# Patient Record
Sex: FEMALE | Race: WHITE | NOT HISPANIC OR LATINO | Employment: OTHER | URBAN - NONMETROPOLITAN AREA
[De-identification: names, ages, dates, MRNs, and addresses within clinical notes are randomized per-mention and may not be internally consistent; named-entity substitution may affect disease eponyms.]

---

## 2017-02-28 ENCOUNTER — OFFICE VISIT (OUTPATIENT)
Dept: FAMILY MEDICINE CLINIC | Facility: CLINIC | Age: 67
End: 2017-02-28

## 2017-02-28 VITALS
DIASTOLIC BLOOD PRESSURE: 82 MMHG | HEIGHT: 67 IN | WEIGHT: 205.8 LBS | BODY MASS INDEX: 32.3 KG/M2 | SYSTOLIC BLOOD PRESSURE: 134 MMHG | HEART RATE: 70 BPM | OXYGEN SATURATION: 98 %

## 2017-02-28 DIAGNOSIS — Z00.00 ANNUAL PHYSICAL EXAM: ICD-10-CM

## 2017-02-28 DIAGNOSIS — Z11.59 NEED FOR HEPATITIS C SCREENING TEST: ICD-10-CM

## 2017-02-28 DIAGNOSIS — E55.9 VITAMIN D DEFICIENCY: ICD-10-CM

## 2017-02-28 DIAGNOSIS — I10 ESSENTIAL HYPERTENSION: Primary | Chronic | ICD-10-CM

## 2017-02-28 PROCEDURE — 99213 OFFICE O/P EST LOW 20 MIN: CPT | Performed by: GENERAL PRACTICE

## 2017-02-28 RX ORDER — LOSARTAN POTASSIUM AND HYDROCHLOROTHIAZIDE 25; 100 MG/1; MG/1
1 TABLET ORAL DAILY
Qty: 30 TABLET | Refills: 4 | Status: SHIPPED | OUTPATIENT
Start: 2017-02-28 | End: 2017-06-13 | Stop reason: SDUPTHER

## 2017-02-28 RX ORDER — AMLODIPINE BESYLATE 10 MG/1
10 TABLET ORAL DAILY
Qty: 30 TABLET | Refills: 3 | Status: SHIPPED | OUTPATIENT
Start: 2017-02-28 | End: 2017-06-13 | Stop reason: SDUPTHER

## 2017-02-28 NOTE — PROGRESS NOTES
Subjective   Cari Brooks is a 66 y.o. female.     Chief Complaint   Patient presents with   • Follow-up   • Hypertension     History of Present Illness     For review and evaluation of management of chronic medical problems. Blood pressure is not well controlled.   Hypertension   This is a chronic problem. The current episode started more than 1 year ago. The problem is unchanged. The problem is uncontrolled. Associated symptoms include anxiety. Pertinent negatives include no chest pain, headaches, neck pain, palpitations or shortness of breath. There are no associated agents to hypertension. The current treatment provides moderate improvement. There are no compliance problems.       The following portions of the patient's history were reviewed and updated as appropriate: allergies, current medications, past social history and problem list.    Current Outpatient Prescriptions:   •  amLODIPine (NORVASC) 10 MG tablet, Take 1 tablet by mouth Daily., Disp: 30 tablet, Rfl: 3  •  Cholecalciferol (D3-1000) 1000 UNITS capsule, Take 1,000 Units by mouth Daily. cholecalciferol (vitamin D3) 1,000 unit capsule, Disp: , Rfl:   •  hydrOXYzine (ATARAX) 25 MG tablet, Take 1 tablet by mouth 2 (Two) Times a Day As Needed for anxiety. 0.5-1 tablet 2 times per day Oral PRN, Disp: 30 tablet, Rfl: 1  •  losartan-hydrochlorothiazide (HYZAAR) 100-25 MG per tablet, Take 1 tablet by mouth Daily., Disp: 30 tablet, Rfl: 4  •  potassium chloride (MICRO-K) 8 MEQ CR capsule, Take 8 mEq by mouth Daily., Disp: , Rfl:     Review of Systems   Constitutional: Negative.  Negative for activity change, appetite change, chills, fatigue, fever and unexpected weight change.   HENT: Negative.  Negative for congestion, ear pain, hearing loss, nosebleeds, rhinorrhea, sinus pressure, sneezing, sore throat, tinnitus and trouble swallowing.    Eyes: Negative.  Negative for pain, discharge, redness, itching and visual disturbance.   Respiratory: Negative.   "Negative for apnea, cough, chest tightness, shortness of breath and wheezing.    Cardiovascular: Negative.  Negative for chest pain, palpitations and leg swelling.   Gastrointestinal: Negative.  Negative for abdominal distention, abdominal pain, constipation, diarrhea, nausea and vomiting.   Endocrine: Negative.    Genitourinary: Negative.  Negative for dysuria, frequency and urgency.   Musculoskeletal: Negative.  Negative for arthralgias, back pain, gait problem, joint swelling, myalgias, neck pain and neck stiffness.   Skin: Negative.  Negative for color change and rash.   Allergic/Immunologic: Negative.    Neurological: Negative.  Negative for dizziness, weakness, light-headedness, numbness and headaches.   Hematological: Negative.  Negative for adenopathy.   Psychiatric/Behavioral: Negative.  Negative for dysphoric mood and sleep disturbance. The patient is not nervous/anxious.      Objective     Visit Vitals   • /82   • Pulse 70   • Ht 66.5\" (168.9 cm)   • Wt 205 lb 12.8 oz (93.4 kg)   • SpO2 98%   • BMI 32.72 kg/m2     Physical Exam   Constitutional: She is oriented to person, place, and time. She appears well-developed and well-nourished. No distress.   HENT:   Head: Normocephalic and atraumatic.   Nose: Nose normal.   Mouth/Throat: Oropharynx is clear and moist.   Eyes: Conjunctivae and EOM are normal. Pupils are equal, round, and reactive to light. Right eye exhibits no discharge. Left eye exhibits no discharge.   Neck: No thyromegaly present.   Cardiovascular: Normal rate, regular rhythm, normal heart sounds and intact distal pulses.    Pulmonary/Chest: Effort normal and breath sounds normal.   Lymphadenopathy:     She has no cervical adenopathy.   Neurological: She is alert and oriented to person, place, and time.   Skin: Skin is warm and dry.   Psychiatric: She has a normal mood and affect.   Nursing note and vitals reviewed.    Assessment/Plan     Problem List Items Addressed This Visit        " Cardiovascular and Mediastinum    Essential hypertension - Primary    Relevant Medications    amLODIPine (NORVASC) 10 MG tablet    losartan-hydrochlorothiazide (HYZAAR) 100-25 MG per tablet       Digestive    Vitamin D deficiency    Relevant Orders    Vitamin D 25 Hydroxy      Other Visit Diagnoses     Annual physical exam        Relevant Orders    Comprehensive Metabolic Panel    Lipid Panel    Urinalysis With / Culture If Indicated    Hepatitis C antibody    Need for hepatitis C screening test        Relevant Orders    Hepatitis C antibody        Recommended weight loss and exercise. If blood pressure not better next time will need to adjust medications.     New Medications Ordered This Visit   Medications   • amLODIPine (NORVASC) 10 MG tablet     Sig: Take 1 tablet by mouth Daily.     Dispense:  30 tablet     Refill:  3   • losartan-hydrochlorothiazide (HYZAAR) 100-25 MG per tablet     Sig: Take 1 tablet by mouth Daily.     Dispense:  30 tablet     Refill:  4

## 2017-06-06 ENCOUNTER — LAB (OUTPATIENT)
Dept: LAB | Facility: HOSPITAL | Age: 67
End: 2017-06-06

## 2017-06-06 DIAGNOSIS — Z11.59 NEED FOR HEPATITIS C SCREENING TEST: ICD-10-CM

## 2017-06-06 DIAGNOSIS — Z00.00 ANNUAL PHYSICAL EXAM: ICD-10-CM

## 2017-06-06 DIAGNOSIS — E55.9 VITAMIN D DEFICIENCY: ICD-10-CM

## 2017-06-06 LAB
25(OH)D3 SERPL-MCNC: 30.7 NG/ML (ref 30–100)
ALBUMIN SERPL-MCNC: 4.5 G/DL (ref 3.4–4.8)
ALBUMIN/GLOB SERPL: 1.3 G/DL (ref 1.1–1.8)
ALP SERPL-CCNC: 100 U/L (ref 38–126)
ALT SERPL W P-5'-P-CCNC: 30 U/L (ref 9–52)
ANION GAP SERPL CALCULATED.3IONS-SCNC: 9 MMOL/L (ref 5–15)
ARTICHOKE IGE QN: 107 MG/DL (ref 1–129)
AST SERPL-CCNC: 32 U/L (ref 14–36)
BACTERIA UR QL AUTO: ABNORMAL /HPF
BILIRUB SERPL-MCNC: 0.6 MG/DL (ref 0.2–1.3)
BILIRUB UR QL STRIP: NEGATIVE
BUN BLD-MCNC: 17 MG/DL (ref 7–21)
BUN/CREAT SERPL: 20.5 (ref 7–25)
CALCIUM SPEC-SCNC: 9.9 MG/DL (ref 8.4–10.2)
CHLORIDE SERPL-SCNC: 100 MMOL/L (ref 95–110)
CHOLEST SERPL-MCNC: 198 MG/DL (ref 0–199)
CLARITY UR: CLEAR
CO2 SERPL-SCNC: 30 MMOL/L (ref 22–31)
COLOR UR: YELLOW
CREAT BLD-MCNC: 0.83 MG/DL (ref 0.5–1)
GFR SERPL CREATININE-BSD FRML MDRD: 69 ML/MIN/1.73 (ref 60–104)
GLOBULIN UR ELPH-MCNC: 3.6 GM/DL (ref 2.3–3.5)
GLUCOSE BLD-MCNC: 96 MG/DL (ref 60–100)
GLUCOSE UR STRIP-MCNC: NEGATIVE MG/DL
HDLC SERPL-MCNC: 57 MG/DL (ref 60–200)
HGB UR QL STRIP.AUTO: ABNORMAL
HYALINE CASTS UR QL AUTO: ABNORMAL /LPF
KETONES UR QL STRIP: NEGATIVE
LDLC/HDLC SERPL: 1.96 {RATIO} (ref 0–3.22)
LEUKOCYTE ESTERASE UR QL STRIP.AUTO: NEGATIVE
NITRITE UR QL STRIP: NEGATIVE
PH UR STRIP.AUTO: 6.5 [PH] (ref 5–9)
POTASSIUM BLD-SCNC: 3.7 MMOL/L (ref 3.5–5.1)
PROT SERPL-MCNC: 8.1 G/DL (ref 6.3–8.6)
PROT UR QL STRIP: NEGATIVE
RBC # UR: ABNORMAL /HPF
REF LAB TEST METHOD: ABNORMAL
SODIUM BLD-SCNC: 139 MMOL/L (ref 137–145)
SP GR UR STRIP: 1.01 (ref 1–1.03)
SQUAMOUS #/AREA URNS HPF: ABNORMAL /HPF
TRIGL SERPL-MCNC: 145 MG/DL (ref 20–199)
UROBILINOGEN UR QL STRIP: ABNORMAL
WBC UR QL AUTO: ABNORMAL /HPF

## 2017-06-06 PROCEDURE — 36415 COLL VENOUS BLD VENIPUNCTURE: CPT

## 2017-06-06 PROCEDURE — 80053 COMPREHEN METABOLIC PANEL: CPT | Performed by: GENERAL PRACTICE

## 2017-06-06 PROCEDURE — 81001 URINALYSIS AUTO W/SCOPE: CPT | Performed by: GENERAL PRACTICE

## 2017-06-06 PROCEDURE — 86803 HEPATITIS C AB TEST: CPT | Performed by: GENERAL PRACTICE

## 2017-06-06 PROCEDURE — 82306 VITAMIN D 25 HYDROXY: CPT | Performed by: GENERAL PRACTICE

## 2017-06-06 PROCEDURE — 80061 LIPID PANEL: CPT | Performed by: GENERAL PRACTICE

## 2017-06-07 LAB — HCV AB SER DONR QL: NEGATIVE

## 2017-06-12 DIAGNOSIS — Z12.31 ENCOUNTER FOR SCREENING MAMMOGRAM FOR MALIGNANT NEOPLASM OF BREAST: Primary | ICD-10-CM

## 2017-06-13 ENCOUNTER — OFFICE VISIT (OUTPATIENT)
Dept: FAMILY MEDICINE CLINIC | Facility: CLINIC | Age: 67
End: 2017-06-13

## 2017-06-13 VITALS
HEIGHT: 67 IN | DIASTOLIC BLOOD PRESSURE: 70 MMHG | WEIGHT: 203 LBS | SYSTOLIC BLOOD PRESSURE: 134 MMHG | HEART RATE: 68 BPM | BODY MASS INDEX: 31.86 KG/M2 | OXYGEN SATURATION: 98 %

## 2017-06-13 DIAGNOSIS — Z12.31 ENCOUNTER FOR SCREENING MAMMOGRAM FOR BREAST CANCER: ICD-10-CM

## 2017-06-13 DIAGNOSIS — E55.9 VITAMIN D DEFICIENCY: Chronic | ICD-10-CM

## 2017-06-13 DIAGNOSIS — I10 ESSENTIAL HYPERTENSION: Primary | Chronic | ICD-10-CM

## 2017-06-13 PROCEDURE — 99213 OFFICE O/P EST LOW 20 MIN: CPT | Performed by: GENERAL PRACTICE

## 2017-06-13 RX ORDER — LOSARTAN POTASSIUM AND HYDROCHLOROTHIAZIDE 25; 100 MG/1; MG/1
1 TABLET ORAL DAILY
Qty: 90 TABLET | Refills: 3 | Status: SHIPPED | OUTPATIENT
Start: 2017-06-13 | End: 2017-12-13 | Stop reason: SDUPTHER

## 2017-06-13 RX ORDER — POTASSIUM CHLORIDE 600 MG/1
8 CAPSULE, EXTENDED RELEASE ORAL DAILY
Qty: 90 CAPSULE | Refills: 3 | Status: SHIPPED | OUTPATIENT
Start: 2017-06-13 | End: 2017-12-13 | Stop reason: SDUPTHER

## 2017-06-13 RX ORDER — AMLODIPINE BESYLATE 10 MG/1
10 TABLET ORAL DAILY
Qty: 90 TABLET | Refills: 3 | Status: SHIPPED | OUTPATIENT
Start: 2017-06-13 | End: 2017-12-13 | Stop reason: SDUPTHER

## 2017-06-13 NOTE — PROGRESS NOTES
Subjective   Cari Brooks is a 66 y.o. female.     Chief Complaint   Patient presents with   • Annual Exam   • Hypertension   • Anxiety   • Vitamin D Deficiency   • Osteoarthritis     For review and evaluation of management of chronic medical problems.  Labs reviewed. Due for mammogram.    Hypertension   This is a chronic problem. The current episode started more than 1 year ago. The problem is unchanged. The problem is controlled. There are no associated agents to hypertension. Past treatments include angiotensin blockers, diuretics, beta blockers and calcium channel blockers. The current treatment provides significant improvement. There are no compliance problems.       The following portions of the patient's history were reviewed and updated as appropriate: allergies, current medications, past family and social history and problem list.      Current Outpatient Prescriptions:   •  amLODIPine (NORVASC) 10 MG tablet, Take 1 tablet by mouth Daily., Disp: 90 tablet, Rfl: 3  •  Cholecalciferol (D3-1000) 1000 UNITS capsule, Take 1,000 Units by mouth Daily. cholecalciferol (vitamin D3) 1,000 unit capsule, Disp: , Rfl:   •  hydrOXYzine (ATARAX) 25 MG tablet, Take 1 tablet by mouth 2 (Two) Times a Day As Needed for anxiety. 0.5-1 tablet 2 times per day Oral PRN, Disp: 30 tablet, Rfl: 1  •  losartan-hydrochlorothiazide (HYZAAR) 100-25 MG per tablet, Take 1 tablet by mouth Daily., Disp: 90 tablet, Rfl: 3  •  potassium chloride (MICRO-K) 8 MEQ CR capsule, Take 1 capsule by mouth Daily., Disp: 90 capsule, Rfl: 3    Review of Systems   Constitutional: Negative.  Negative for activity change, appetite change and unexpected weight change.   HENT: Negative.  Negative for ear pain, hearing loss, nosebleeds, rhinorrhea, sinus pressure, sneezing, tinnitus and trouble swallowing.    Eyes: Negative.  Negative for pain, discharge, redness, itching and visual disturbance.   Respiratory: Negative.  Negative for apnea, chest  "tightness and wheezing.    Cardiovascular: Negative.  Negative for leg swelling.   Gastrointestinal: Negative.  Negative for abdominal distention, constipation and diarrhea.   Endocrine: Negative.    Genitourinary: Negative.  Negative for dysuria, frequency and urgency.   Musculoskeletal: Negative.  Negative for back pain, gait problem and neck stiffness.   Skin: Negative.  Negative for color change.   Allergic/Immunologic: Negative.    Neurological: Negative.  Negative for light-headedness.   Hematological: Negative.  Negative for adenopathy.   Psychiatric/Behavioral: Negative.  Negative for dysphoric mood and sleep disturbance.     Objective     Visit Vitals   • /70   • Pulse 68   • Ht 66.5\" (168.9 cm)   • Wt 203 lb (92.1 kg)   • LMP  (LMP Unknown)   • SpO2 98%   • BMI 32.27 kg/m2     Physical Exam   Constitutional: She is oriented to person, place, and time. She appears well-developed and well-nourished. No distress.   HENT:   Head: Normocephalic and atraumatic.   Nose: Nose normal.   Mouth/Throat: Oropharynx is clear and moist.   Eyes: Conjunctivae and EOM are normal. Pupils are equal, round, and reactive to light. Right eye exhibits no discharge. Left eye exhibits no discharge.   Neck: No tracheal deviation present. No thyromegaly present.   Cardiovascular: Normal rate, regular rhythm, normal heart sounds and intact distal pulses.    No murmur heard.  Pulmonary/Chest: Effort normal and breath sounds normal. No respiratory distress. She has no wheezes. She has no rales. She exhibits no tenderness. Right breast exhibits no inverted nipple, no mass, no nipple discharge, no skin change and no tenderness. Left breast exhibits no inverted nipple, no mass, no nipple discharge, no skin change and no tenderness.   Abdominal: Soft. Bowel sounds are normal. She exhibits no distension and no mass. There is no tenderness. No hernia.   Musculoskeletal: Normal range of motion. She exhibits no deformity. "   Lymphadenopathy:     She has no cervical adenopathy.   Neurological: She is alert and oriented to person, place, and time. She has normal reflexes.   Skin: Skin is warm and dry.   Psychiatric: She has a normal mood and affect. Her behavior is normal. Judgment and thought content normal.   Nursing note and vitals reviewed.    Results for orders placed or performed in visit on 06/06/17   Comprehensive Metabolic Panel   Result Value Ref Range    Glucose 96 60 - 100 mg/dL    BUN 17 7 - 21 mg/dL    Creatinine 0.83 0.50 - 1.00 mg/dL    Sodium 139 137 - 145 mmol/L    Potassium 3.7 3.5 - 5.1 mmol/L    Chloride 100 95 - 110 mmol/L    CO2 30.0 22.0 - 31.0 mmol/L    Calcium 9.9 8.4 - 10.2 mg/dL    Total Protein 8.1 6.3 - 8.6 g/dL    Albumin 4.50 3.40 - 4.80 g/dL    ALT (SGPT) 30 9 - 52 U/L    AST (SGOT) 32 14 - 36 U/L    Alkaline Phosphatase 100 38 - 126 U/L    Total Bilirubin 0.6 0.2 - 1.3 mg/dL    eGFR Non African Amer 69 >60 mL/min/1.73    Globulin 3.6 (H) 2.3 - 3.5 gm/dL    A/G Ratio 1.3 1.1 - 1.8 g/dL    BUN/Creatinine Ratio 20.5 7.0 - 25.0    Anion Gap 9.0 5.0 - 15.0 mmol/L   Lipid Panel   Result Value Ref Range    Total Cholesterol 198 0 - 199 mg/dL    Triglycerides 145 20 - 199 mg/dL    HDL Cholesterol 57 (L) 60 - 200 mg/dL    LDL Cholesterol  107 1 - 129 mg/dL    LDL/HDL Ratio 1.96 0.00 - 3.22   Vitamin D 25 Hydroxy   Result Value Ref Range    25 Hydroxy, Vitamin D 30.7 30.0 - 100.0 ng/ml   Urinalysis With / Culture If Indicated   Result Value Ref Range    Color, UA Yellow Yellow, Straw, Dark Yellow, [place]    Appearance, UA Clear Clear    pH, UA 6.5 5.0 - 9.0    Specific Gravity, UA 1.012 1.003 - 1.030    Glucose, UA Negative Negative    Ketones, UA Negative Negative    Bilirubin, UA Negative Negative    Blood, UA Trace (A) Negative    Protein, UA Negative Negative    Leuk Esterase, UA Negative Negative    Nitrite, UA Negative Negative    Urobilinogen, UA 0.2 E.U./dL 0.2 - 1.0 E.U./dL   Hepatitis C antibody   Result  Value Ref Range    Hepatitis C Ab Negative Negative   Urinalysis, Microscopic Only   Result Value Ref Range    RBC, UA 0-2 (A) None Seen /HPF    WBC, UA 0-2 None Seen, 0-2, 3-5 /HPF    Bacteria, UA None Seen None Seen /HPF    Squamous Epithelial Cells, UA None Seen None Seen, 0-2 /HPF    Hyaline Casts, UA None Seen None Seen /LPF    Methodology Automated Microscopy       Assessment/Plan   Problem List Items Addressed This Visit        Cardiovascular and Mediastinum    Essential hypertension - Primary    Relevant Medications    losartan-hydrochlorothiazide (HYZAAR) 100-25 MG per tablet    amLODIPine (NORVASC) 10 MG tablet       Digestive    Vitamin D deficiency      Other Visit Diagnoses     Encounter for screening mammogram for breast cancer            Will notify regarding results.     New Medications Ordered This Visit   Medications   • potassium chloride (MICRO-K) 8 MEQ CR capsule     Sig: Take 1 capsule by mouth Daily.     Dispense:  90 capsule     Refill:  3   • losartan-hydrochlorothiazide (HYZAAR) 100-25 MG per tablet     Sig: Take 1 tablet by mouth Daily.     Dispense:  90 tablet     Refill:  3   • amLODIPine (NORVASC) 10 MG tablet     Sig: Take 1 tablet by mouth Daily.     Dispense:  90 tablet     Refill:  3     Return in about 6 months (around 12/13/2017) for medicare wellness visit.

## 2017-12-06 ENCOUNTER — TELEPHONE (OUTPATIENT)
Dept: FAMILY MEDICINE CLINIC | Facility: CLINIC | Age: 67
End: 2017-12-06

## 2017-12-13 ENCOUNTER — LAB (OUTPATIENT)
Dept: LAB | Facility: HOSPITAL | Age: 67
End: 2017-12-13

## 2017-12-13 ENCOUNTER — OFFICE VISIT (OUTPATIENT)
Dept: FAMILY MEDICINE CLINIC | Facility: CLINIC | Age: 67
End: 2017-12-13

## 2017-12-13 VITALS
SYSTOLIC BLOOD PRESSURE: 132 MMHG | HEIGHT: 67 IN | DIASTOLIC BLOOD PRESSURE: 75 MMHG | WEIGHT: 205.2 LBS | OXYGEN SATURATION: 98 % | BODY MASS INDEX: 32.21 KG/M2 | HEART RATE: 64 BPM

## 2017-12-13 DIAGNOSIS — T50.905A ADVERSE EFFECT OF UNSPECIFIED DRUGS, MEDICAMENTS AND BIOLOGICAL SUBSTANCES, INITIAL ENCOUNTER: ICD-10-CM

## 2017-12-13 DIAGNOSIS — R19.7 DIARRHEA, UNSPECIFIED TYPE: Primary | ICD-10-CM

## 2017-12-13 DIAGNOSIS — R19.7 DIARRHEA, UNSPECIFIED TYPE: ICD-10-CM

## 2017-12-13 DIAGNOSIS — I10 ESSENTIAL HYPERTENSION: Chronic | ICD-10-CM

## 2017-12-13 DIAGNOSIS — R11.2 NON-INTRACTABLE VOMITING WITH NAUSEA, UNSPECIFIED VOMITING TYPE: ICD-10-CM

## 2017-12-13 LAB
ALBUMIN SERPL-MCNC: 4.5 G/DL (ref 3.4–4.8)
ALBUMIN/GLOB SERPL: 1.3 G/DL (ref 1.1–1.8)
ALP SERPL-CCNC: 79 U/L (ref 38–126)
ALT SERPL W P-5'-P-CCNC: 35 U/L (ref 9–52)
ANION GAP SERPL CALCULATED.3IONS-SCNC: 14 MMOL/L (ref 5–15)
AST SERPL-CCNC: 48 U/L (ref 14–36)
BASOPHILS # BLD AUTO: 0.04 10*3/MM3 (ref 0–0.2)
BASOPHILS NFR BLD AUTO: 0.4 % (ref 0–2)
BILIRUB SERPL-MCNC: 0.5 MG/DL (ref 0.2–1.3)
BUN BLD-MCNC: 16 MG/DL (ref 7–21)
BUN/CREAT SERPL: 16.7 (ref 7–25)
CALCIUM SPEC-SCNC: 10.3 MG/DL (ref 8.4–10.2)
CHLORIDE SERPL-SCNC: 97 MMOL/L (ref 95–110)
CO2 SERPL-SCNC: 30 MMOL/L (ref 22–31)
CREAT BLD-MCNC: 0.96 MG/DL (ref 0.5–1)
DEPRECATED RDW RBC AUTO: 39.3 FL (ref 36.4–46.3)
EOSINOPHIL # BLD AUTO: 0.16 10*3/MM3 (ref 0–0.7)
EOSINOPHIL NFR BLD AUTO: 1.7 % (ref 0–7)
ERYTHROCYTE [DISTWIDTH] IN BLOOD BY AUTOMATED COUNT: 13.1 % (ref 11.5–14.5)
GFR SERPL CREATININE-BSD FRML MDRD: 58 ML/MIN/1.73 (ref 45–104)
GLOBULIN UR ELPH-MCNC: 3.6 GM/DL (ref 2.3–3.5)
GLUCOSE BLD-MCNC: 78 MG/DL (ref 60–100)
HCT VFR BLD AUTO: 42.6 % (ref 35–45)
HGB BLD-MCNC: 14.6 G/DL (ref 12–15.5)
IMM GRANULOCYTES # BLD: 0.02 10*3/MM3 (ref 0–0.02)
IMM GRANULOCYTES NFR BLD: 0.2 % (ref 0–0.5)
LYMPHOCYTES # BLD AUTO: 2.93 10*3/MM3 (ref 0.6–4.2)
LYMPHOCYTES NFR BLD AUTO: 31.2 % (ref 10–50)
MCH RBC QN AUTO: 29 PG (ref 26.5–34)
MCHC RBC AUTO-ENTMCNC: 34.3 G/DL (ref 31.4–36)
MCV RBC AUTO: 84.5 FL (ref 80–98)
MONOCYTES # BLD AUTO: 0.78 10*3/MM3 (ref 0–0.9)
MONOCYTES NFR BLD AUTO: 8.3 % (ref 0–12)
NEUTROPHILS # BLD AUTO: 5.46 10*3/MM3 (ref 2–8.6)
NEUTROPHILS NFR BLD AUTO: 58.2 % (ref 37–80)
PLATELET # BLD AUTO: 342 10*3/MM3 (ref 150–450)
PMV BLD AUTO: 9.5 FL (ref 8–12)
POTASSIUM BLD-SCNC: 3 MMOL/L (ref 3.5–5.1)
PROT SERPL-MCNC: 8.1 G/DL (ref 6.3–8.6)
RBC # BLD AUTO: 5.04 10*6/MM3 (ref 3.77–5.16)
SODIUM BLD-SCNC: 141 MMOL/L (ref 137–145)
T4 FREE SERPL-MCNC: 1.27 NG/DL (ref 0.78–2.19)
TSH SERPL DL<=0.05 MIU/L-ACNC: 1.88 MIU/ML (ref 0.46–4.68)
WBC NRBC COR # BLD: 9.39 10*3/MM3 (ref 3.2–9.8)

## 2017-12-13 PROCEDURE — 85025 COMPLETE CBC W/AUTO DIFF WBC: CPT

## 2017-12-13 PROCEDURE — 99213 OFFICE O/P EST LOW 20 MIN: CPT | Performed by: GENERAL PRACTICE

## 2017-12-13 PROCEDURE — 86003 ALLG SPEC IGE CRUDE XTRC EA: CPT

## 2017-12-13 PROCEDURE — 80053 COMPREHEN METABOLIC PANEL: CPT

## 2017-12-13 PROCEDURE — 84443 ASSAY THYROID STIM HORMONE: CPT

## 2017-12-13 PROCEDURE — G0438 PPPS, INITIAL VISIT: HCPCS | Performed by: GENERAL PRACTICE

## 2017-12-13 PROCEDURE — 36415 COLL VENOUS BLD VENIPUNCTURE: CPT

## 2017-12-13 PROCEDURE — 84439 ASSAY OF FREE THYROXINE: CPT

## 2017-12-13 RX ORDER — LOSARTAN POTASSIUM AND HYDROCHLOROTHIAZIDE 25; 100 MG/1; MG/1
1 TABLET ORAL DAILY
Qty: 90 TABLET | Refills: 3 | Status: SHIPPED | OUTPATIENT
Start: 2017-12-13 | End: 2018-06-22 | Stop reason: SDUPTHER

## 2017-12-13 RX ORDER — HYDROXYZINE HYDROCHLORIDE 25 MG/1
25 TABLET, FILM COATED ORAL 2 TIMES DAILY PRN
Qty: 30 TABLET | Refills: 3 | Status: SHIPPED | OUTPATIENT
Start: 2017-12-13 | End: 2018-10-05

## 2017-12-13 RX ORDER — OMEPRAZOLE 20 MG/1
20 CAPSULE, DELAYED RELEASE ORAL DAILY
Qty: 30 CAPSULE | Refills: 5 | Status: SHIPPED | OUTPATIENT
Start: 2017-12-13 | End: 2018-10-05

## 2017-12-13 RX ORDER — AMLODIPINE BESYLATE 10 MG/1
10 TABLET ORAL DAILY
Qty: 90 TABLET | Refills: 3 | Status: SHIPPED | OUTPATIENT
Start: 2017-12-13 | End: 2018-06-22 | Stop reason: SDUPTHER

## 2017-12-13 RX ORDER — POTASSIUM CHLORIDE 600 MG/1
8 CAPSULE, EXTENDED RELEASE ORAL DAILY
Qty: 90 CAPSULE | Refills: 3 | Status: SHIPPED | OUTPATIENT
Start: 2017-12-13 | End: 2017-12-14 | Stop reason: SDUPTHER

## 2017-12-13 NOTE — PROGRESS NOTES
QUICK REFERENCE INFORMATION:  The ABCs of the Annual Wellness Visit    Initial Medicare Wellness Visit    HEALTH RISK ASSESSMENT    1950    Recent Hospitalizations:  No hospitalization(s) within the last year..        Current Medical Providers:  Patient Care Team:  Peyton Stockton MD as PCP - General  Peyton Stockton MD as PCP - Claims Attributed  Jorje Delgado, OD as Consulting Physician (Optometry)  Nathen Szymanski DMD as Consulting Physician (Dental General Practice)        Smoking Status:  History   Smoking Status   • Never Smoker   Smokeless Tobacco   • Never Used       Alcohol Consumption:  History   Alcohol Use No       Depression Screen:   PHQ-2/PHQ-9 Depression Screening 12/13/2017   Little interest or pleasure in doing things 0   Feeling down, depressed, or hopeless 0   Trouble falling or staying asleep, or sleeping too much 1   Feeling tired or having little energy 1   Poor appetite or overeating 0   Feeling bad about yourself - or that you are a failure or have let yourself or your family down 0   Trouble concentrating on things, such as reading the newspaper or watching television 0   Moving or speaking so slowly that other people could have noticed. Or the opposite - being so fidgety or restless that you have been moving around a lot more than usual 0   Thoughts that you would be better off dead, or of hurting yourself in some way 0   Total Score 2   If you checked off any problems, how difficult have these problems made it for you to do your work, take care of things at home, or get along with other people? Not difficult at all       Health Habits and Functional and Cognitive Screening:  Functional & Cognitive Status 12/13/2017   Do you have difficulty preparing food and eating? No   Do you have difficulty bathing yourself, getting dressed or grooming yourself? No   Do you have difficulty using the toilet? No   Do you have difficulty moving around from place to place? No   Do you have  trouble with steps or getting out of a bed or a chair? No   In the past year have you fallen or experienced a near fall? No   Current Diet Well Balanced Diet   Dental Exam Up to date   Eye Exam Up to date   Exercise (times per week) 2 times per week   Do you need help using the phone?  No   Are you deaf or do you have serious difficulty hearing?  No   Do you need help with transportation? No   Do you need help shopping? No   Do you need help preparing meals?  No   Do you need help with housework?  No   Do you need help with laundry? No   Do you need help taking your medications? No   Do you need help managing money? No   Have you felt unusual stress, anger or loneliness in the last month? No   Who do you live with? Spouse   If you need help, do you have trouble finding someone available to you? No   Do you have difficulty concentrating, remembering or making decisions? No           Does the patient have evidence of cognitive impairment? No    Asiprin use counseling: Does not need ASA (and currently is not on it)      Recent Lab Results:    Visual Acuity:  No exam data present    Age-appropriate Screening Schedule:  Refer to the list below for future screening recommendations based on patient's age, sex and/or medical conditions. Orders for these recommended tests are listed in the plan section. The patient has been provided with a written plan.    Health Maintenance   Topic Date Due   • PNEUMOCOCCAL VACCINES (65+ LOW/MEDIUM RISK) (2 of 2 - PPSV23) 01/09/2019   • MAMMOGRAM  06/13/2019   • COLONOSCOPY  11/13/2022   • TDAP/TD VACCINES (2 - Td) 06/13/2027   • INFLUENZA VACCINE  Addressed   • ZOSTER VACCINE  Addressed   • PAP SMEAR  Excluded        Subjective   History of Present Illness    Cari Brooks is a 66 y.o. female who presents for an Annual Wellness Visit.    The following portions of the patient's history were reviewed and updated as appropriate: allergies, current medications, past family history, past  medical history, past social history, past surgical history and problem list.    Outpatient Medications Prior to Visit   Medication Sig Dispense Refill   • Cholecalciferol (D3-1000) 1000 UNITS capsule Take 1,000 Units by mouth Daily. cholecalciferol (vitamin D3) 1,000 unit capsule     • amLODIPine (NORVASC) 10 MG tablet Take 1 tablet by mouth Daily. 90 tablet 3   • hydrOXYzine (ATARAX) 25 MG tablet Take 1 tablet by mouth 2 (Two) Times a Day As Needed for anxiety. 0.5-1 tablet 2 times per day Oral PRN 30 tablet 1   • losartan-hydrochlorothiazide (HYZAAR) 100-25 MG per tablet Take 1 tablet by mouth Daily. 90 tablet 3   • potassium chloride (MICRO-K) 8 MEQ CR capsule Take 1 capsule by mouth Daily. 90 capsule 3     No facility-administered medications prior to visit.        Patient Active Problem List   Diagnosis   • Anxiety state   • Asthma   • Essential hypertension   • Vitamin D deficiency   • Osteoarthritis       Advance Care Planning:  has NO advance directive - information provided to the patient today    Identification of Risk Factors:  Risk factors include: weight , unhealthy diet, inactivity and increased fall risk.    Review of Systems    Compared to one year ago, the patient feels her physical health is the same.  Compared to one year ago, the patient feels her mental health is the same.    Objective     Physical Exam   Constitutional: She is oriented to person, place, and time. She appears well-developed and well-nourished. No distress.   HENT:   Head: Normocephalic and atraumatic.   Nose: Nose normal.   Mouth/Throat: Oropharynx is clear and moist.   Eyes: Conjunctivae and EOM are normal. Pupils are equal, round, and reactive to light. Right eye exhibits no discharge. Left eye exhibits no discharge.   Neck: No thyromegaly present.   Cardiovascular: Normal rate, regular rhythm, normal heart sounds and intact distal pulses.    Pulmonary/Chest: Effort normal and breath sounds normal.   Lymphadenopathy:     She  "has no cervical adenopathy.   Neurological: She is alert and oriented to person, place, and time.   Skin: Skin is warm and dry.   Psychiatric: She has a normal mood and affect.   Nursing note and vitals reviewed.      Vitals:    12/13/17 0917   BP: 132/75   BP Location: Left arm   Patient Position: Sitting   Cuff Size: Adult   Pulse: 64   SpO2: 98%   Weight: 93.1 kg (205 lb 3.2 oz)   Height: 168.9 cm (66.5\")   PainSc: 0-No pain       Body mass index is 32.62 kg/(m^2).  Discussed the patient's BMI with her. BMI is above normal parameters. Follow-up plan includes:  exercise counseling and nutrition counseling.    Assessment/Plan   Patient Self-Management and Personalized Health Advice  The patient has been provided with information about: diet, exercise, weight management and fall prevention and preventive services including:   · Advance directive, Exercise counseling provided, Fall Risk assessment done, Fall Risk plan of care done.    Visit Diagnoses:    ICD-10-CM ICD-9-CM   1. Diarrhea, unspecified type R19.7 787.91   2. Adverse effect of unspecified drugs, medicaments and biological substances, initial encounter  T50.905A E947.8   3. Essential hypertension I10 401.9   4. Non-intractable vomiting with nausea, unspecified vomiting type R11.2 787.01       Orders Placed This Encounter   Procedures   • TSH     Standing Status:   Future     Number of Occurrences:   1     Standing Expiration Date:   12/13/2018   • T4, Free     Standing Status:   Future     Number of Occurrences:   1     Standing Expiration Date:   12/13/2018   • Comprehensive Metabolic Panel     Standing Status:   Future     Number of Occurrences:   1     Standing Expiration Date:   12/13/2018   • Allergen Profile, Basic Food     Standing Status:   Future     Number of Occurrences:   1     Standing Expiration Date:   12/13/2018   • CBC & Differential     Standing Status:   Future     Number of Occurrences:   1     Standing Expiration Date:   12/13/2018    "  Order Specific Question:   Manual Differential     Answer:   No       Outpatient Encounter Prescriptions as of 12/13/2017   Medication Sig Dispense Refill   • amLODIPine (NORVASC) 10 MG tablet Take 1 tablet by mouth Daily. 90 tablet 3   • Cholecalciferol (D3-1000) 1000 UNITS capsule Take 1,000 Units by mouth Daily. cholecalciferol (vitamin D3) 1,000 unit capsule     • hydrOXYzine (ATARAX) 25 MG tablet Take 1 tablet by mouth 2 (Two) Times a Day As Needed for Anxiety. 0.5-1 tablet 2 times per day Oral PRN 30 tablet 3   • losartan-hydrochlorothiazide (HYZAAR) 100-25 MG per tablet Take 1 tablet by mouth Daily. 90 tablet 3   • [DISCONTINUED] amLODIPine (NORVASC) 10 MG tablet Take 1 tablet by mouth Daily. 90 tablet 3   • [DISCONTINUED] hydrOXYzine (ATARAX) 25 MG tablet Take 1 tablet by mouth 2 (Two) Times a Day As Needed for anxiety. 0.5-1 tablet 2 times per day Oral PRN 30 tablet 1   • [DISCONTINUED] losartan-hydrochlorothiazide (HYZAAR) 100-25 MG per tablet Take 1 tablet by mouth Daily. 90 tablet 3   • [DISCONTINUED] potassium chloride (MICRO-K) 8 MEQ CR capsule Take 1 capsule by mouth Daily. 90 capsule 3   • [DISCONTINUED] potassium chloride (MICRO-K) 8 MEQ CR capsule Take 1 capsule by mouth Daily. 90 capsule 3   • omeprazole (PRILOSEC) 20 MG capsule Take 1 capsule by mouth Daily. 30 capsule 5     No facility-administered encounter medications on file as of 12/13/2017.        Reviewed use of high risk medication in the elderly: yes  Reviewed for potential of harmful drug interactions in the elderly: not applicable    Follow Up:  Return in about 6 months (around 6/13/2018) for Annual physical.     An After Visit Summary and PPPS with all of these plans were given to the patient.    Information has been scanned into chart.  Discussed importance of taking medications as prescribed. Encouraged healthy eating habits with low fat, low salt choices and working towards maintaining a healthy weight. Recommended regular  exercise if able as well as care to prevent falls.

## 2017-12-13 NOTE — PATIENT INSTRUCTIONS
Medicare Wellness  Personal Prevention Plan of Service     Date of Office Visit:  2017  Encounter Provider:  Peyton Stockton MD  Place of Service:  [facility]  Patient Name: Cari Brooks  :  1950    As part of the Medicare Wellness portion of your visit today, we are providing you with this personalized preventive plan of services (PPPS). This plan is based upon recommendations of the [place] Preventive Services Task Force (USPSTF) and the Advisory Committee on Immunization Practices (ACIP).    This lists the preventive care services that should be considered, and provides dates of when you are due. Items listed as completed are up-to-date and do not require any further intervention.    Health Maintenance   Topic Date Due   • MEDICARE ANNUAL WELLNESS  2018   • PNEUMOCOCCAL VACCINES (65+ LOW/MEDIUM RISK) (2 of 2 - PPSV23) 2019   • MAMMOGRAM  2019   • COLONOSCOPY  2022   • TDAP/TD VACCINES (2 - Td) 2027   • HEPATITIS C SCREENING  Completed   • INFLUENZA VACCINE  Addressed   • ZOSTER VACCINE  Addressed   • PAP SMEAR  Excluded       Orders Placed This Encounter   Procedures   • TSH     Standing Status:   Future     Number of Occurrences:   1     Standing Expiration Date:   2018   • T4, Free     Standing Status:   Future     Number of Occurrences:   1     Standing Expiration Date:   2018   • Comprehensive Metabolic Panel     Standing Status:   Future     Number of Occurrences:   1     Standing Expiration Date:   2018   • Allergen Profile, Basic Food     Standing Status:   Future     Number of Occurrences:   1     Standing Expiration Date:   2018   • CBC & Differential     Standing Status:   Future     Number of Occurrences:   1     Standing Expiration Date:   2018     Order Specific Question:   Manual Differential     Answer:   No       Return in about 6 months (around 2018) for Annual  physical.

## 2017-12-13 NOTE — PROGRESS NOTES
Subjective   Cari Brooks is a 66 y.o. female.   No chief complaint on file.    For review and evaluation of management of chronic medical problems. Has been having some diarrhea off and over last year, lasting about 3 days. Will start as epigastric pain and then have diarrhea and intense vomiting. Has tried taking Nexium which seems to calm it down.   Hypertension   This is a chronic problem. The current episode started more than 1 year ago. The problem is unchanged. The problem is controlled. Pertinent negatives include no chest pain, headaches, neck pain, palpitations or shortness of breath. There are no associated agents to hypertension. Past treatments include angiotensin blockers, diuretics, beta blockers and calcium channel blockers. The current treatment provides significant improvement. There are no compliance problems.      The following portions of the patient's history were reviewed and updated as appropriate: allergies, current medications, past social history and problem list.    Outpatient Medications Prior to Visit   Medication Sig Dispense Refill   • Cholecalciferol (D3-1000) 1000 UNITS capsule Take 1,000 Units by mouth Daily. cholecalciferol (vitamin D3) 1,000 unit capsule     • amLODIPine (NORVASC) 10 MG tablet Take 1 tablet by mouth Daily. 90 tablet 3   • hydrOXYzine (ATARAX) 25 MG tablet Take 1 tablet by mouth 2 (Two) Times a Day As Needed for anxiety. 0.5-1 tablet 2 times per day Oral PRN 30 tablet 1   • losartan-hydrochlorothiazide (HYZAAR) 100-25 MG per tablet Take 1 tablet by mouth Daily. 90 tablet 3   • potassium chloride (MICRO-K) 8 MEQ CR capsule Take 1 capsule by mouth Daily. 90 capsule 3     No facility-administered medications prior to visit.        Review of Systems   Constitutional: Negative.  Negative for chills, fatigue, fever and unexpected weight change.   HENT: Negative.  Negative for congestion, ear pain, hearing loss, nosebleeds, rhinorrhea, sneezing, sore throat and  "tinnitus.    Eyes: Negative.  Negative for discharge.   Respiratory: Negative.  Negative for cough, shortness of breath and wheezing.    Cardiovascular: Negative.  Negative for chest pain and palpitations.   Gastrointestinal: Positive for abdominal pain, diarrhea, nausea and vomiting. Negative for constipation.   Endocrine: Negative.    Genitourinary: Negative.  Negative for dysuria, frequency and urgency.   Musculoskeletal: Negative.  Negative for arthralgias, back pain, joint swelling, myalgias and neck pain.   Skin: Negative.  Negative for rash.   Allergic/Immunologic: Negative.    Neurological: Negative.  Negative for dizziness, weakness, numbness and headaches.   Hematological: Negative.  Negative for adenopathy.   Psychiatric/Behavioral: Negative.  Negative for dysphoric mood and sleep disturbance. The patient is not nervous/anxious.        Objective   Visit Vitals   • /75 (BP Location: Left arm, Patient Position: Sitting, Cuff Size: Adult)   • Pulse 64   • Ht 168.9 cm (66.5\")   • Wt 93.1 kg (205 lb 3.2 oz)   • SpO2 98%   • BMI 32.62 kg/m2     Physical Exam   Constitutional: She is oriented to person, place, and time. She appears well-developed and well-nourished. No distress.   HENT:   Head: Normocephalic and atraumatic.   Nose: Nose normal.   Mouth/Throat: Oropharynx is clear and moist.   Eyes: Conjunctivae and EOM are normal. Pupils are equal, round, and reactive to light. Right eye exhibits no discharge. Left eye exhibits no discharge.   Neck: No thyromegaly present.   Cardiovascular: Normal rate, regular rhythm, normal heart sounds and intact distal pulses.    Pulmonary/Chest: Effort normal and breath sounds normal.   Lymphadenopathy:     She has no cervical adenopathy.   Neurological: She is alert and oriented to person, place, and time.   Skin: Skin is warm and dry.   Psychiatric: She has a normal mood and affect.   Nursing note and vitals reviewed.    Assessment/Plan   Problem List Items " Addressed This Visit        Cardiovascular and Mediastinum    Essential hypertension (Chronic)    Relevant Medications    losartan-hydrochlorothiazide (HYZAAR) 100-25 MG per tablet    amLODIPine (NORVASC) 10 MG tablet      Other Visit Diagnoses     Diarrhea, unspecified type    -  Primary    Relevant Orders    TSH (Completed)    T4, Free (Completed)    Comprehensive Metabolic Panel (Completed)    CBC & Differential (Completed)    Allergen Profile, Basic Food    Adverse effect of unspecified drugs, medicaments and biological substances, initial encounter         Relevant Orders    Allergen Profile, Basic Food    Non-intractable vomiting with nausea, unspecified vomiting type             Will notify regarding results. Start omeprazole daily and recheck if symptoms return.     New Medications Ordered This Visit   Medications   • omeprazole (PRILOSEC) 20 MG capsule     Sig: Take 1 capsule by mouth Daily.     Dispense:  30 capsule     Refill:  5   • losartan-hydrochlorothiazide (HYZAAR) 100-25 MG per tablet     Sig: Take 1 tablet by mouth Daily.     Dispense:  90 tablet     Refill:  3   • amLODIPine (NORVASC) 10 MG tablet     Sig: Take 1 tablet by mouth Daily.     Dispense:  90 tablet     Refill:  3   • hydrOXYzine (ATARAX) 25 MG tablet     Sig: Take 1 tablet by mouth 2 (Two) Times a Day As Needed for Anxiety. 0.5-1 tablet 2 times per day Oral PRN     Dispense:  30 tablet     Refill:  3     Return in about 6 months (around 6/13/2018) for Annual physical.

## 2017-12-14 DIAGNOSIS — I10 ESSENTIAL HYPERTENSION: Primary | ICD-10-CM

## 2017-12-14 RX ORDER — POTASSIUM CHLORIDE 600 MG/1
8 CAPSULE, EXTENDED RELEASE ORAL 2 TIMES DAILY
Qty: 180 CAPSULE | Refills: 3 | Status: SHIPPED | OUTPATIENT
Start: 2017-12-14 | End: 2018-07-18 | Stop reason: SDUPTHER

## 2017-12-21 LAB
BEEF IGE QN: <0.1 KU/L
COCOA IGE QN: <0.1 KU/L
CONV CLASS DESCRIPTION: NORMAL
CORN IGE QN: <0.1 KU/L
COW MILK IGE QN: <0.1 KU/L
FOOD ALLERG MIX2 IGE QL: NEGATIVE
PEANUT IGE QN: <0.1 KU/L
PORK IGE QN: <0.1 KU/L
SOYBEAN IGE QN: <0.1 KU/L
WHEAT IGE QN: <0.1 KU/L
WHOLE EGG IGE QN: <0.1 KU/L

## 2017-12-22 ENCOUNTER — TELEPHONE (OUTPATIENT)
Dept: FAMILY MEDICINE CLINIC | Facility: CLINIC | Age: 67
End: 2017-12-22

## 2018-01-18 ENCOUNTER — LAB (OUTPATIENT)
Dept: LAB | Facility: HOSPITAL | Age: 68
End: 2018-01-18

## 2018-01-18 DIAGNOSIS — I10 ESSENTIAL HYPERTENSION: ICD-10-CM

## 2018-01-18 LAB
ANION GAP SERPL CALCULATED.3IONS-SCNC: 12 MMOL/L (ref 5–15)
BUN BLD-MCNC: 18 MG/DL (ref 7–21)
BUN/CREAT SERPL: 17.5 (ref 7–25)
CALCIUM SPEC-SCNC: 10.4 MG/DL (ref 8.4–10.2)
CHLORIDE SERPL-SCNC: 97 MMOL/L (ref 95–110)
CO2 SERPL-SCNC: 31 MMOL/L (ref 22–31)
CREAT BLD-MCNC: 1.03 MG/DL (ref 0.5–1)
GFR SERPL CREATININE-BSD FRML MDRD: 54 ML/MIN/1.73 (ref 45–104)
GLUCOSE BLD-MCNC: 112 MG/DL (ref 60–100)
POTASSIUM BLD-SCNC: 3.7 MMOL/L (ref 3.5–5.1)
SODIUM BLD-SCNC: 140 MMOL/L (ref 137–145)

## 2018-01-18 PROCEDURE — 80048 BASIC METABOLIC PNL TOTAL CA: CPT

## 2018-01-18 PROCEDURE — 36415 COLL VENOUS BLD VENIPUNCTURE: CPT

## 2018-01-25 ENCOUNTER — TELEPHONE (OUTPATIENT)
Dept: FAMILY MEDICINE CLINIC | Facility: CLINIC | Age: 68
End: 2018-01-25

## 2018-01-26 NOTE — PROGRESS NOTES
Pr Dr. Stockton, MsWendy Todd has been called with her recent lab results & recommendations.  Continue her current medications and follow-up as planned or sooner if any problems.

## 2018-01-26 NOTE — TELEPHONE ENCOUNTER
Pr Dr. Stockton, Ms. Brooks has been called with her recent lab results & recommendations.  Continue her current medications and follow-up as planned or sooner if any problems.      ----- Message from Peyton Stockton MD sent at 1/24/2018  3:22 PM CST -----  Call and tell potassium is back to lane;

## 2018-03-31 ENCOUNTER — TELEPHONE (OUTPATIENT)
Dept: URGENT CARE | Facility: CLINIC | Age: 68
End: 2018-03-31

## 2018-04-23 ENCOUNTER — APPOINTMENT (OUTPATIENT)
Dept: LAB | Facility: HOSPITAL | Age: 68
End: 2018-04-23

## 2018-04-23 ENCOUNTER — OFFICE VISIT (OUTPATIENT)
Dept: FAMILY MEDICINE CLINIC | Facility: CLINIC | Age: 68
End: 2018-04-23

## 2018-04-23 VITALS
WEIGHT: 207 LBS | SYSTOLIC BLOOD PRESSURE: 160 MMHG | HEIGHT: 67 IN | DIASTOLIC BLOOD PRESSURE: 80 MMHG | OXYGEN SATURATION: 98 % | HEART RATE: 88 BPM | BODY MASS INDEX: 32.49 KG/M2

## 2018-04-23 DIAGNOSIS — M54.41 ACUTE RIGHT-SIDED LOW BACK PAIN WITH RIGHT-SIDED SCIATICA: ICD-10-CM

## 2018-04-23 DIAGNOSIS — R20.0 RIGHT LEG NUMBNESS: ICD-10-CM

## 2018-04-23 DIAGNOSIS — M79.604 RIGHT LEG PAIN: Primary | ICD-10-CM

## 2018-04-23 DIAGNOSIS — M51.36 DEGENERATIVE DISC DISEASE, LUMBAR: ICD-10-CM

## 2018-04-23 LAB
AMPHET+METHAMPHET UR QL: NEGATIVE
BARBITURATES UR QL SCN: NEGATIVE
BENZODIAZ UR QL SCN: NEGATIVE
CANNABINOIDS SERPL QL: NEGATIVE
COCAINE UR QL: NEGATIVE
METHADONE UR QL SCN: NEGATIVE
OPIATES UR QL: POSITIVE
OXYCODONE UR QL SCN: NEGATIVE

## 2018-04-23 PROCEDURE — 99214 OFFICE O/P EST MOD 30 MIN: CPT | Performed by: GENERAL PRACTICE

## 2018-04-23 PROCEDURE — 80307 DRUG TEST PRSMV CHEM ANLYZR: CPT | Performed by: GENERAL PRACTICE

## 2018-04-23 RX ORDER — NAPROXEN SODIUM 220 MG
220 TABLET ORAL 4 TIMES DAILY
COMMUNITY
End: 2018-07-18

## 2018-04-23 RX ORDER — METHYLPREDNISOLONE 4 MG/1
TABLET ORAL
Qty: 1 EACH | Refills: 0 | Status: SHIPPED | OUTPATIENT
Start: 2018-04-23 | End: 2018-05-25

## 2018-04-23 RX ORDER — HYDROCODONE BITARTRATE AND ACETAMINOPHEN 7.5; 325 MG/1; MG/1
1 TABLET ORAL EVERY 6 HOURS PRN
Qty: 40 TABLET | Refills: 0 | Status: SHIPPED | OUTPATIENT
Start: 2018-04-23 | End: 2018-05-25 | Stop reason: SDUPTHER

## 2018-04-23 NOTE — PROGRESS NOTES
Subjective   Cari Brooks is a 67 y.o. female.   Chief Complaint   Patient presents with   • Follow-up     rt sciatic nerve pain x 3 weeks getting worse   • Hypertension     3 weeks ago started having pain in right buttock and down the leg almost to foot. Had the flu and had been laying around a lot. Has a history of degenerative disc disease and has had previous back surgery.  Back Pain   This is a new problem. The current episode started 1 to 4 weeks ago. The problem occurs constantly. The problem is unchanged. The pain is present in the lumbar spine and gluteal. The quality of the pain is described as aching, burning and cramping. The pain radiates to the right knee and right thigh. The pain is at a severity of 7/10. The pain is severe. The pain is worse during the day. The symptoms are aggravated by bending and position. Stiffness is present in the morning. Associated symptoms include numbness, tingling and weakness. Pertinent negatives include no abdominal pain, bladder incontinence, bowel incontinence, chest pain, dysuria, fever, headaches or perianal numbness. She has tried NSAIDs and ice for the symptoms. The treatment provided moderate relief.      The following portions of the patient's history were reviewed and updated as appropriate: allergies, current medications, past social history and problem list.    Outpatient Medications Prior to Visit   Medication Sig Dispense Refill   • acetaminophen (TYLENOL) 500 MG tablet Take 500 mg by mouth Every 6 (Six) Hours As Needed for Mild Pain .     • amLODIPine (NORVASC) 10 MG tablet Take 1 tablet by mouth Daily. 90 tablet 3   • Cholecalciferol (D3-1000) 1000 UNITS capsule Take 1,000 Units by mouth Daily. cholecalciferol (vitamin D3) 1,000 unit capsule     • hydrOXYzine (ATARAX) 25 MG tablet Take 1 tablet by mouth 2 (Two) Times a Day As Needed for Anxiety. 0.5-1 tablet 2 times per day Oral PRN 30 tablet 3   • losartan-hydrochlorothiazide (HYZAAR) 100-25 MG per  "tablet Take 1 tablet by mouth Daily. 90 tablet 3   • omeprazole (PRILOSEC) 20 MG capsule Take 1 capsule by mouth Daily. 30 capsule 5   • potassium chloride (MICRO-K) 8 MEQ CR capsule Take 1 capsule by mouth 2 (Two) Times a Day. 180 capsule 3   • azithromycin (ZITHROMAX) 250 MG tablet Take 2 tablets the first day, then 1 tablet daily for 4 days. 6 tablet 0   • promethazine-dextromethorphan (PROMETHAZINE-DM) 6.25-15 MG/5ML syrup Take one teaspoon qhs prn cough 120 mL 0     No facility-administered medications prior to visit.        Review of Systems   Constitutional: Negative for chills, fatigue, fever and unexpected weight change.   HENT: Negative.  Negative for congestion, ear pain, hearing loss, nosebleeds, rhinorrhea, sneezing, sore throat and tinnitus.    Eyes: Negative.  Negative for discharge.   Respiratory: Negative.  Negative for cough, shortness of breath and wheezing.    Cardiovascular: Negative.  Negative for chest pain and palpitations.   Gastrointestinal: Negative.  Negative for abdominal pain, bowel incontinence, constipation, diarrhea, nausea and vomiting.   Endocrine: Negative.    Genitourinary: Negative.  Negative for bladder incontinence, dysuria, frequency and urgency.   Musculoskeletal: Positive for back pain. Negative for arthralgias, joint swelling, myalgias and neck pain.   Skin: Negative.  Negative for rash.   Allergic/Immunologic: Negative.    Neurological: Positive for tingling, weakness and numbness. Negative for dizziness and headaches.   Hematological: Negative.  Negative for adenopathy.   Psychiatric/Behavioral: Negative.  Negative for dysphoric mood and sleep disturbance. The patient is not nervous/anxious.        Objective   Visit Vitals  /80   Pulse 88   Ht 170.2 cm (67\")   Wt 93.9 kg (207 lb)   LMP  (LMP Unknown)   SpO2 98%   BMI 32.42 kg/m²     Physical Exam   Constitutional: She is oriented to person, place, and time. She appears well-developed and well-nourished. No " distress.   HENT:   Head: Normocephalic and atraumatic.   Nose: Nose normal.   Mouth/Throat: Oropharynx is clear and moist.   Eyes: Conjunctivae and EOM are normal. Pupils are equal, round, and reactive to light. Right eye exhibits no discharge. Left eye exhibits no discharge.   Neck: No thyromegaly present.   Cardiovascular: Normal rate, regular rhythm, normal heart sounds and intact distal pulses.    Pulmonary/Chest: Effort normal and breath sounds normal.   Musculoskeletal:        Lumbar back: She exhibits decreased range of motion and tenderness.   SLR 90 bilat   Lymphadenopathy:     She has no cervical adenopathy.   Neurological: She is alert and oriented to person, place, and time.   Reflex Scores:       Patellar reflexes are 0 on the right side and 2+ on the left side.       Achilles reflexes are 2+ on the right side and 2+ on the left side.  Skin: Skin is warm and dry.   Psychiatric: She has a normal mood and affect.   Nursing note and vitals reviewed.      Assessment/Plan   Problem List Items Addressed This Visit     None      Visit Diagnoses     Right leg pain    -  Primary    Relevant Medications    HYDROcodone-acetaminophen (NORCO) 7.5-325 MG per tablet    Other Relevant Orders    Ambulatory Referral to Physical Therapy Evaluate and treat    Urine Drug Screen - Urine, Clean Catch    Right leg numbness        Relevant Medications    HYDROcodone-acetaminophen (NORCO) 7.5-325 MG per tablet    Other Relevant Orders    Ambulatory Referral to Physical Therapy Evaluate and treat    Urine Drug Screen - Urine, Clean Catch    Acute right-sided low back pain with right-sided sciatica        Relevant Medications    HYDROcodone-acetaminophen (NORCO) 7.5-325 MG per tablet    Other Relevant Orders    Ambulatory Referral to Physical Therapy Evaluate and treat    Urine Drug Screen - Urine, Clean Catch    Degenerative disc disease, lumbar        Relevant Orders    Urine Drug Screen - Urine, Clean Catch         Recheck if  not improving. May need MRI     New Medications Ordered This Visit   Medications   • naproxen sodium (ALEVE) 220 MG tablet     Sig: Take 220 mg by mouth 4 (Four) Times a Day.   • MethylPREDNISolone (MEDROL, LAMBERTO,) 4 MG tablet     Sig: Take as directed on package instructions.     Dispense:  1 each     Refill:  0   • HYDROcodone-acetaminophen (NORCO) 7.5-325 MG per tablet     Sig: Take 1 tablet by mouth Every 6 (Six) Hours As Needed for Moderate Pain .     Dispense:  40 tablet     Refill:  0     Return if symptoms worsen or fail to improve, for Next scheduled follow up.

## 2018-04-26 ENCOUNTER — HOSPITAL ENCOUNTER (OUTPATIENT)
Dept: PHYSICAL THERAPY | Facility: HOSPITAL | Age: 68
Setting detail: THERAPIES SERIES
Discharge: HOME | End: 2018-04-26

## 2018-04-26 DIAGNOSIS — M79.604 RIGHT LEG PAIN: Primary | ICD-10-CM

## 2018-04-26 DIAGNOSIS — M54.41 ACUTE LOW BACK PAIN WITH RIGHT-SIDED SCIATICA, UNSPECIFIED BACK PAIN LATERALITY: ICD-10-CM

## 2018-04-26 PROCEDURE — 97162 PT EVAL MOD COMPLEX 30 MIN: CPT

## 2018-04-27 PROCEDURE — G8978 MOBILITY CURRENT STATUS: HCPCS

## 2018-04-27 PROCEDURE — G8979 MOBILITY GOAL STATUS: HCPCS

## 2018-04-27 NOTE — THERAPY EVALUATION
Outpatient Physical Therapy Ortho Initial Evaluation  [facility]     Patient Name: Cari Brooks  : 1950  MRN: [id number]  Today's Date: 2018      Visit Date: 2018   Attendance:  Subjective Improvement:na  Next MD Appt: JE  Recert Date: 5/15/18      Patient Active Problem List   Diagnosis   • Anxiety state   • Asthma   • Essential hypertension   • Vitamin D deficiency   • Osteoarthritis        Past Medical History:   Diagnosis Date   • Acute maxillary sinusitis    • Acute sinusitis    • Allergic rhinitis    • Anxiety state    • Asthma    • Bronchopneumonia    • Contact dermatitis due to plant    • Cough    • Edema of lower extremity    • Encounter for general adult medical examination without abnormal findings    • Encounter for immunization    • Encounter for routine adult health examination    • Equinus contracture of ankle    • Essential hypertension    • Fever    • Heel pain    • Knee pain    • Osteoarthritis    • Otalgia    • Pain in limb    • Plantar fasciitis    • Routine general medical examination at health care facility    • Screening for malignant neoplasm of colon    • Screening mammogram, encounter for    • Synovial cyst of knee    • Vitamin D deficiency         Past Surgical History:   Procedure Laterality Date   • ENDOSCOPY  2012    Colon endoscopy 49422 (2)    Mild diverticulosis in sigmoid colon. 1 polyp in colon 35 cm from entry & 1 polyp in colon 15 cm from entry; both removed by cold biopsy polypectomy. Hemorrhoids found.    • HYSTERECTOMY  1986    Hysterectomy w/ oophorectomy (1)     • INJECTION OF MEDICATION  2015    Kenalog (3)      • LAPAROSCOPIC CHOLECYSTECTOMY  10/04/2001   • OOPHORECTOMY     • PAP SMEAR  2009   • REPAIR ANKLE LIGAMENT  10/12/2004   • TONSILLECTOMY  1956       Visit Dx:     ICD-10-CM ICD-9-CM   1. Right leg pain M79.604 729.5   2. Acute low back pain with right-sided sciatica, unspecified back pain laterality  M54.41 724.2     724.3             Patient History     Row Name 04/26/18 1000             History    Chief Complaint Pain  -RF      Type of Pain Back pain  -RF      Date Current Problem(s) Began 04/07/18  -RF      Brief Description of Current Complaint PAtient c/o of low back/ right leg pain after lying on couch for extended period due to having the flu  -RF      Patient/Caregiver Goals Relieve pain;Return to prior level of function  -RF      Current Tobacco Use no  -RF      Smoking Status no  -RF      History of Previous Related Injuries low back pain with left sided leg pain which required surgery in 09  -RF         Pain     Pain Location Leg;Back  -RF      Pain at Present 6  -RF      Pain at Best 1  -RF      Pain at Worst 9  -RF      Pain Frequency Constant/continuous  -RF      Is your sleep disturbed? Yes   patient has to sleep on recliner chair due to pain  -RF      What position do you sleep in? Other (comment)   supine with legs elevated  -RF        User Key  (r) = Recorded By, (t) = Taken By, (c) = Cosigned By    Initials Name Provider Type    RF Carlos Strickland III, PT Physical Therapist                PT Ortho     Row Name 04/27/18 0700       Neural Tension Signs- Lower Quarter Clearing    SLR Negative;Bilateral:  -RF    Prone knee flexion Positive;Left:;Right:  -RF       Special Tests/Palpation    Special Tests/Palpation Lumbar/SI  -RF       Lumbar/SI Special Tests    Lumbar/SI Special Tests Comments Patient wiht pain wiht light palaption to lumbar/ paraspsinals withmuscle guarding, significant increase in discomfort/pain in prone  -RF       General ROM    Head/Neck/Trunk Comments  -RF    GENERAL ROM COMMENTS Patient significantly limited with RIGHT lumbar rotation/SB, and lumbar extension with significant pain, flexion minimally limited  -RF       Head/Neck/Trunk    Head/Neck/Trunk Comments Patient significantly limited with RIGHT lumbar rotation/SB, and lumbar extension with significant pain, flexion  minimally limited  -RF       General Assessment (Manual Muscle Testing)    Comment, General Manual Muscle Testing (MMT) Assessment LE- knee extension on rigth 3+/5, DF 4-/5, knee flexion 3+/5, abduction 4/5, Patient presents with significant weakness on right side  -RF       Sensation    Additional Comments L5/S1 dermatomes 20% diminished  -RF       Transfers    Transfer, Impairments pain  -RF    Comment (Transfers) Pain with transitions from sit to stand   -RF       Gait/Stairs Assessment/Training    Comment (Gait/Stairs) Patient presents with antalgic gait pattern with increased CELESTE, and decreased time in right stance, and decreased toe off with RLE  -RF      User Key  (r) = Recorded By, (t) = Taken By, (c) = Cosigned By    Initials Name Provider Type    RF Carlos Stricklnad III, PT Physical Therapist                      Therapy Education  Education Details: educated patient on HEP consisting of repeated flexion in istting 10-15 reps every two hours, and regarding POC  Given: HEP, Symptoms/condition management, Pain management, Posture/body mechanics  How Provided: Verbal, Demonstration  Provided to: Patient  Level of Understanding: Teach back education performed           PT OP Goals     Row Name 04/27/18 0700          PT Short Term Goals    STG Date to Achieve 05/15/18  -RF     STG 1 Patient will be independent with HEP  -RF     STG 2 Patient will improve knee extension/ flexion strength to 4+/5  -RF     STG 3 Patient will imporve ankle df strength to 4+/5  -RF     STG 4 Patient will imporve LEFS by 10 points   -RF        Long Term Goals    LTG Date to Achieve 05/30/18  -RF     LTG 1 Patient will imporve all LE strength testing to 5/5  -RF     LTG 2 Patient will be able to ambulate community distances with normailized gait pattern  -RF     LTG 3 Patient will imporve Lumbar arom in all directions to WFL and without pain  -RF     LTG 4 Patient will imporve LEFS to 10% or less disability  -RF        Time Calculation     PT Goal Re-Cert Due Date 05/15/18  -RF       User Key  (r) = Recorded By, (t) = Taken By, (c) = Cosigned By    Initials Name Provider Type    RF Carlos Strickland III, PT Physical Therapist                PT Assessment/Plan     Row Name 04/27/18 0700          PT Assessment    Functional Limitations --  -RF     Impairments Gait;Endurance;Muscle strength;Pain  -RF     Assessment Comments Patient presents with impairments with lubar rom, pain  in lower leg, pain in lumbar spine, and decreased LE strength. Therefore patient has functional limitations with ambulation without increase in pain, prolonged standing, difficulty sleeping, completing actviites around home such as gardening/cleaning. Patient would benefit from skilled PT services to improve upon these functional limitations and impairments to return to PLOF and imporve QOL.   -RF     Rehab Potential Good  -RF     Patient/caregiver participated in establishment of treatment plan and goals Yes  -RF     Patient would benefit from skilled therapy intervention Yes  -RF        PT Plan    PT Frequency 2x/week  -RF     Predicted Duration of Therapy Intervention (OT Eval) 6-8wk  -RF     Planned CPT's? PT EVAL MOD COMPLELITY: 63881;PT THER PROC EA 15 MIN: 79887;PT THER ACT EA 15 MIN: 66793;PT NEUROMUSC RE-EDUCATION EA 15 MIN: 69369;PT MANUAL THERAPY EA 15 MIN: 52068;PT GAIT TRAINING EA 15 MIN: 93458;PT ELECTRICAL STIM UNATTEND: ;PT EVAL AQUA: 81956;PT TRACTION LUMBAR: 74923;PT HOT/COLD PACK WC NONMCARE: 29087;PT THER SUPP EA 15 MIN;PT RE-EVAL: 39624  -RF     PT Plan Comments Plan of care focused on imporving lumbar rom, pain management modalites, flexion exercises, LE / core strengthening.   -RF       User Key  (r) = Recorded By, (t) = Taken By, (c) = Cosigned By    Initials Name Provider Type    RF Carlos Strickland III, PT Physical Therapist                  Exercises     Row Name 04/27/18 0700             Subjective Comments    Subjective Comments Patient 66 y/o  female presents with c/o of right leg pain which began approx 3 weeks ago after having prolonged bed rest due to flu, patient bgean having right leg pain on lateral aspect of thigh distally to lower leg. Patient states she is having difficulty walking/standing without increase in pain, and states he right LE feels weak . Patient states she takes OTC medication aleve which provides minimal relief. Patient reports having similar symptoms in 2009 which required surgery. Patient currently taking steriod dose back which ends on sunday..  -RF         Subjective Pain    Able to rate subjective pain? yes  -RF      Pre-Treatment Pain Level 6  -RF      Post-Treatment Pain Level 6  -RF         Exercise 1    Exercise Name 1 repeated flexion in sitting   -RF      Sets 1 3  -RF      Reps 1 15  -RF        User Key  (r) = Recorded By, (t) = Taken By, (c) = Cosigned By    Initials Name Provider Type    RF Carlos Strickland III, PT Physical Therapist                        Outcome Measure Options: Lower Extremity Functional Scale (LEFS)  Lower Extremity Functional Index  Any of your usual work, housework or school activities: Moderate difficulty  Your usual hobbies, recreational or sporting activities: Quite a bit of difficulty  Getting into or out of the bath: A little bit of difficulty  Walking between rooms: Moderate difficulty  Putting on your shoes or socks: A little bit of difficulty  Squatting: Moderate difficulty  Lifting an object, like a bag of groceries from the floor: Moderate difficulty  Performing light activities around your home: A little bit of difficulty  Performing heavy activities around your home: Quite a bit of difficulty  Getting into or out of a car: A little bit of difficulty  Walking 2 blocks: Extreme difficulty or unable to perform activity  Walking a mile: Extreme difficulty or unable to perform activity  Going up or down 10 stairs (about 1 flight of stairs): Quite a bit of difficulty  Standing for 1 hour:  Extreme difficulty or unable to perform activity  Sitting for 1 hour: Quite a bit of difficulty  Running on even ground: Extreme difficulty or unable to perform activity  Running on uneven ground: Extreme difficulty or unable to perform activity  Making sharp turns while running fast: Extreme difficulty or unable to perform activity  Hopping: Extreme difficulty or unable to perform activity  Rolling over in bed: Moderate difficulty  Total: 26      Time Calculation:   Start Time: 1000  Stop Time: 1058  Time Calculation (min): 58 min     Therapy Charges for Today     Code Description Service Date Service Provider Modifiers Qty    65649226858 HC PT EVAL MOD COMPLEXITY 3 4/26/2018 Carlos Strickland III, PT GP 1    67633165833 HC PT THER SUPP EA 15 MIN 4/26/2018 Carlos Strickland III, PT GP 1    97481013425 HC PT MOBILITY CURRENT 4/27/2018 Carlos Strickland III, PT GP, CL 1    94234952101 HC PT MOBILITY PROJECTED 4/27/2018 Carlos Strickland III, PT GP, CI 1          PT G-Codes  PT Professional Judgement Used?: Yes  Outcome Measure Options: Lower Extremity Functional Scale (LEFS)  Score: 26/80= 68 % disability  Functional Limitation: Mobility: Walking and moving around  Mobility: Walking and Moving Around Current Status (): At least 60 percent but less than 80 percent impaired, limited or restricted  Mobility: Walking and Moving Around Goal Status (): At least 1 percent but less than 20 percent impaired, limited or restricted         Carlos Strickland III, PT  4/27/2018

## 2018-05-01 ENCOUNTER — HOSPITAL ENCOUNTER (OUTPATIENT)
Dept: PHYSICAL THERAPY | Facility: HOSPITAL | Age: 68
Setting detail: THERAPIES SERIES
Discharge: HOME | End: 2018-05-01

## 2018-05-01 DIAGNOSIS — M79.604 RIGHT LEG PAIN: Primary | ICD-10-CM

## 2018-05-01 DIAGNOSIS — M54.41 ACUTE LOW BACK PAIN WITH RIGHT-SIDED SCIATICA, UNSPECIFIED BACK PAIN LATERALITY: ICD-10-CM

## 2018-05-01 PROCEDURE — G0283 ELEC STIM OTHER THAN WOUND: HCPCS

## 2018-05-01 PROCEDURE — 97110 THERAPEUTIC EXERCISES: CPT

## 2018-05-01 PROCEDURE — 97140 MANUAL THERAPY 1/> REGIONS: CPT

## 2018-05-01 NOTE — THERAPY TREATMENT NOTE
Outpatient Physical Therapy Ortho Treatment Note  [facility]     Patient Name: Cari Brooks  : 1950  MRN: [id number]  Today's Date: 2018      Visit Date: 2018  Attendance: 2/  Subjective Improvement: 0 %   Next MD Appt: N/a  Recert Date: 5/15/18  Visit Dx:    ICD-10-CM ICD-9-CM   1. Right leg pain M79.604 729.5   2. Acute low back pain with right-sided sciatica, unspecified back pain laterality M54.41 724.2     724.3       Patient Active Problem List   Diagnosis   • Anxiety state   • Asthma   • Essential hypertension   • Vitamin D deficiency   • Osteoarthritis        Past Medical History:   Diagnosis Date   • Acute maxillary sinusitis    • Acute sinusitis    • Allergic rhinitis    • Anxiety state    • Asthma    • Bronchopneumonia    • Contact dermatitis due to plant    • Cough    • Edema of lower extremity    • Encounter for general adult medical examination without abnormal findings    • Encounter for immunization    • Encounter for routine adult health examination    • Equinus contracture of ankle    • Essential hypertension    • Fever    • Heel pain    • Knee pain    • Osteoarthritis    • Otalgia    • Pain in limb    • Plantar fasciitis    • Routine general medical examination at health care facility    • Screening for malignant neoplasm of colon    • Screening mammogram, encounter for    • Synovial cyst of knee    • Vitamin D deficiency         Past Surgical History:   Procedure Laterality Date   • ENDOSCOPY  2012    Colon endoscopy 88412 (2)    Mild diverticulosis in sigmoid colon. 1 polyp in colon 35 cm from entry & 1 polyp in colon 15 cm from entry; both removed by cold biopsy polypectomy. Hemorrhoids found.    • HYSTERECTOMY  1986    Hysterectomy w/ oophorectomy (1)     • INJECTION OF MEDICATION  2015    Kenalog (3)      • LAPAROSCOPIC CHOLECYSTECTOMY  10/04/2001   • OOPHORECTOMY     • PAP SMEAR  2009   • REPAIR ANKLE LIGAMENT  10/12/2004   •  TONSILLECTOMY  06/20/1956             PT Ortho     Row Name 05/01/18 1100       Subjective Comments    Subjective Comments Patient reports 5/10 pain today in standing. PAtient states she did her home exercises with minimal soreness but reports no change in poain. Patient states she did some light house work which caused pain to increase. pt states she is still having difficulty sleeping and sleeping on recliner   -RF       Subjective Pain    Able to rate subjective pain? yes  -RF    Pre-Treatment Pain Level 5  -RF    Post-Treatment Pain Level 5  -RF      User Key  (r) = Recorded By, (t) = Taken By, (c) = Cosigned By    Initials Name Provider Type    RF Carlos Strickland III, PT Physical Therapist                            PT Assessment/Plan     Row Name 05/01/18 1600          PT Assessment    Impairments Gait;Endurance;Muscle strength;Pain  -RF     Assessment Comments Patient reported no change in pain today. PAtient tolerated pelvic tilt and manual flexion exercises well, patient has difficulty with positiional changes throughout. Progress core stab exercises as tolerated   -RF        PT Plan    PT Frequency 2x/week  -RF     PT Plan Comments progress core stabilization exercises as tolerated as well as flexion based exercises, stim ice as needed in hooklying  flexed position  -RF       User Key  (r) = Recorded By, (t) = Taken By, (c) = Cosigned By    Initials Name Provider Type    RF Carlos Strickland III, PT Physical Therapist                Modalities     Row Name 05/01/18 1100             ELECTRICAL STIMULATION    Attended/Unattended Unattended  -RF      Stimulation Type IFC  -RF      Location/Electrode Placement/Other lumbar spine right side/glute region with legs elevated on ball   -RF      Rx Minutes 20 mins  -RF        User Key  (r) = Recorded By, (t) = Taken By, (c) = Cosigned By    Initials Name Provider Type    RF Carlos Strickland III, PT Physical Therapist                Exercises     Row Name 05/01/18 1100              Subjective Comments    Subjective Comments Patient reports 5/10 pain today in standing. PAtient states she did her home exercises with minimal soreness but reports no change in poain. Patient states she did some light house work which caused pain to increase. pt states she is still having difficulty sleeping and sleeping on recliner   -RF         Subjective Pain    Able to rate subjective pain? yes  -RF      Pre-Treatment Pain Level 5  -RF      Post-Treatment Pain Level 5  -RF         Exercise 1    Exercise Name 1 pelvic tilts  -RF      Sets 1 3  -RF      Reps 1 10  -RF         Exercise 2    Exercise Name 2 hooklying rotation  -RF      Sets 2 3 min  -RF         Exercise 3    Exercise Name 3 calfboard stretch  -RF      Sets 3 3  -RF      Reps 3 30 sec  -RF         Exercise 4    Exercise Name 4 sciatic nerve glides   -RF      Sets 4 B manual   -RF         Exercise 5    Exercise Name 5 piriformis stretch B  -RF      Sets 5 3  -RF      Reps 5 30  -RF         Exercise 6    Exercise Name 6 repeated flexion in sitting   -RF      Sets 6 3  -RF      Reps 6 10  -RF        User Key  (r) = Recorded By, (t) = Taken By, (c) = Cosigned By    Initials Name Provider Type    ONOFRE Strickland III, PT Physical Therapist                        Manual Rx (last 36 hours)      Manual Treatments     Row Name 05/01/18 1500             Manual Rx 1    Manual Rx 1 Location passive lumbar flexion, LUmbar flexion with rotation   -RF      Manual Rx 1 Duration 15  -RF        User Key  (r) = Recorded By, (t) = Taken By, (c) = Cosigned By    Initials Name Provider Type    ONOFRE Strickland III, PT Physical Therapist              Therapy Education  Education Details: HEP , addition of exercises , elevate legs on ball at home for pain relief  Given: HEP, Symptoms/condition management, Pain management              Time Calculation:   Start Time: 1000  Stop Time: 1115  Time Calculation (min): 75 min  Total Timed Code Minutes- PT: 55  minute(s)    Therapy Charges for Today     Code Description Service Date Service Provider Modifiers Qty    97453546069 HC PT MANUAL THERAPY EA 15 MIN 5/1/2018 Carlos Strickland III, PT GP 1    31735023612 HC PT THER PROC EA 15 MIN 5/1/2018 Carlos Strickland III, PT GP 3    60798197007 HC PT ELECTRICAL STIM UNATTENDED 5/1/2018 Carlos Strickland III, PT  1                    Carlos Strickland, III, PT  5/1/2018

## 2018-05-03 ENCOUNTER — HOSPITAL ENCOUNTER (OUTPATIENT)
Dept: PHYSICAL THERAPY | Facility: HOSPITAL | Age: 68
Setting detail: THERAPIES SERIES
Discharge: HOME | End: 2018-05-03

## 2018-05-03 DIAGNOSIS — M79.604 RIGHT LEG PAIN: Primary | ICD-10-CM

## 2018-05-03 DIAGNOSIS — M54.41 ACUTE LOW BACK PAIN WITH RIGHT-SIDED SCIATICA, UNSPECIFIED BACK PAIN LATERALITY: ICD-10-CM

## 2018-05-03 PROCEDURE — 97140 MANUAL THERAPY 1/> REGIONS: CPT

## 2018-05-03 PROCEDURE — 97110 THERAPEUTIC EXERCISES: CPT

## 2018-05-03 PROCEDURE — G0283 ELEC STIM OTHER THAN WOUND: HCPCS

## 2018-05-03 NOTE — THERAPY TREATMENT NOTE
Outpatient Physical Therapy Ortho Treatment Note  [facility]     Patient Name: Cari Brooks  : 1950  MRN: [id number]  Today's Date: 5/3/2018      Visit Date: 2018  Attendance: 3/3  Subjective Improvement: 20 %  Next MD Appt: N/a  Recert Date: 5/15/18  Visit Dx:    ICD-10-CM ICD-9-CM   1. Right leg pain M79.604 729.5   2. Acute low back pain with right-sided sciatica, unspecified back pain laterality M54.41 724.2     724.3       Patient Active Problem List   Diagnosis   • Anxiety state   • Asthma   • Essential hypertension   • Vitamin D deficiency   • Osteoarthritis        Past Medical History:   Diagnosis Date   • Acute maxillary sinusitis    • Acute sinusitis    • Allergic rhinitis    • Anxiety state    • Asthma    • Bronchopneumonia    • Contact dermatitis due to plant    • Cough    • Edema of lower extremity    • Encounter for general adult medical examination without abnormal findings    • Encounter for immunization    • Encounter for routine adult health examination    • Equinus contracture of ankle    • Essential hypertension    • Fever    • Heel pain    • Knee pain    • Osteoarthritis    • Otalgia    • Pain in limb    • Plantar fasciitis    • Routine general medical examination at health care facility    • Screening for malignant neoplasm of colon    • Screening mammogram, encounter for    • Synovial cyst of knee    • Vitamin D deficiency         Past Surgical History:   Procedure Laterality Date   • ENDOSCOPY  2012    Colon endoscopy 17389 (2)    Mild diverticulosis in sigmoid colon. 1 polyp in colon 35 cm from entry & 1 polyp in colon 15 cm from entry; both removed by cold biopsy polypectomy. Hemorrhoids found.    • HYSTERECTOMY  1986    Hysterectomy w/ oophorectomy (1)     • INJECTION OF MEDICATION  2015    Kenalog (3)      • LAPAROSCOPIC CHOLECYSTECTOMY  10/04/2001   • OOPHORECTOMY     • PAP SMEAR  2009   • REPAIR ANKLE LIGAMENT  10/12/2004   •  TONSILLECTOMY  06/20/1956             PT Ortho     Row Name 05/03/18 1100       Subjective Pain    Able to rate subjective pain? yes  -RF    Pre-Treatment Pain Level 4  -RF    Row Name 05/01/18 1100       Subjective Comments    Subjective Comments Patient reports 5/10 pain today in standing. PAtient states she did her home exercises with minimal soreness but reports no change in poain. Patient states she did some light house work which caused pain to increase. pt states she is still having difficulty sleeping and sleeping on recliner   -RF       Subjective Pain    Able to rate subjective pain? yes  -RF    Pre-Treatment Pain Level 5  -RF    Post-Treatment Pain Level 5  -RF      User Key  (r) = Recorded By, (t) = Taken By, (c) = Cosigned By    Initials Name Provider Type    RF Carlos Strickland III, PT Physical Therapist                            PT Assessment/Plan     Row Name 05/03/18 1200          PT Assessment    Impairments Gait;Endurance;Muscle strength;Pain  -RF     Assessment Comments Patient demonstrated improved tolerance to tx today, with decrease pain with repeated flexion in supine and with stim /ice  -RF        PT Plan    PT Frequency 2x/week  -RF     PT Plan Comments Progress core/glute exercises s tolerated, continue with repeated flexion exercises in supine with ovp, and ice/e-stim'  -RF       User Key  (r) = Recorded By, (t) = Taken By, (c) = Cosigned By    Initials Name Provider Type    ONOFRE Strickland III, PT Physical Therapist                Modalities     Row Name 05/03/18 1100             ELECTRICAL STIMULATION    Attended/Unattended Unattended  -RF      Stimulation Type IFC  -RF      Location/Electrode Placement/Other lumbar spine right side/glute region with legs elevated on ball   -RF      Rx Minutes 15 mins  -RF        User Key  (r) = Recorded By, (t) = Taken By, (c) = Cosigned By    Initials Name Provider Type    RF Carlos Strickland III, PT Physical Therapist                Exercises      Row Name 05/03/18 1100             Subjective Comments    Subjective Comments Patient states she feels a small improvement in pain, and decreased frequency of pain below the knee. Patient states she was able to sleep on the bed a couple of nights but still has difficulty.   -RF         Subjective Pain    Able to rate subjective pain? yes  -RF      Pre-Treatment Pain Level 4  -RF         Exercise 1    Exercise Name 1 pelvic tilts  -RF      Sets 1 3  -RF      Reps 1 10  -RF         Exercise 2    Exercise Name 2 hooklying rotation  -RF      Sets 2 3 min  -RF         Exercise 3    Exercise Name 3 repeated flexion withoverpressure   -RF      Sets 3 2  -RF      Reps 3 10 Bilateral  -RF      Time 3 5 sec hold  -RF      Additional Comments HEP  -RF         Exercise 4    Exercise Name 4 glute sets   -RF      Sets 4 3  -RF      Reps 4 10  -RF      Additional Comments 5 sec hold  -RF         Exercise 5    Exercise Name 5 hooklying adduction  -RF      Sets 5 2  -RF      Reps 5 10  -RF         Exercise 6    Exercise Name 6 gastroc stretch  -RF      Sets 6 3  -RF      Reps 6 30  -RF        User Key  (r) = Recorded By, (t) = Taken By, (c) = Cosigned By    Initials Name Provider Type    RF Carlos Strickland III, PT Physical Therapist                        Manual Rx (last 36 hours)      Manual Treatments     Row Name 05/03/18 1100             Manual Rx 1    Manual Rx 1 Location passive lumbar flexion, LUmbar flexion with rotation   -RF      Manual Rx 1 Duration 12  -RF        User Key  (r) = Recorded By, (t) = Taken By, (c) = Cosigned By    Initials Name Provider Type    RF Carlos Strickland III, PT Physical Therapist              Therapy Education  Education Details: glute sets , and repeated flexion in supine with ovp to replace flexion in sitting.   Given: HEP  Program: Modified  How Provided: Verbal, Demonstration  Provided to: Patient  Level of Understanding: Teach back education performed              Time Calculation:   Start  Time: 1116  Stop Time: 1213  Time Calculation (min): 57 min  Total Timed Code Minutes- PT: 42 minute(s)    Therapy Charges for Today     Code Description Service Date Service Provider Modifiers Qty    10865380011 HC PT MANUAL THERAPY EA 15 MIN 5/3/2018 Carlos Strickland III, PT GP 1    66057711529 HC PT THER PROC EA 15 MIN 5/3/2018 Carlos Strickland III, PT GP 2    01038353415 HC PT ELECTRICAL STIM UNATTENDED 5/3/2018 Carlos Strickland III, PT  1                    Carlos Strickland, III, PT  5/3/2018

## 2018-05-04 ENCOUNTER — TELEPHONE (OUTPATIENT)
Dept: FAMILY MEDICINE CLINIC | Facility: CLINIC | Age: 68
End: 2018-05-04

## 2018-05-04 DIAGNOSIS — M79.604 RIGHT LEG PAIN: ICD-10-CM

## 2018-05-04 DIAGNOSIS — M54.16 LUMBAR BACK PAIN WITH RADICULOPATHY AFFECTING RIGHT LOWER EXTREMITY: Primary | ICD-10-CM

## 2018-05-04 DIAGNOSIS — R20.0 RIGHT LEG NUMBNESS: ICD-10-CM

## 2018-05-07 ENCOUNTER — APPOINTMENT (OUTPATIENT)
Dept: PHYSICAL THERAPY | Facility: HOSPITAL | Age: 68
End: 2018-05-07

## 2018-05-09 ENCOUNTER — APPOINTMENT (OUTPATIENT)
Dept: PHYSICAL THERAPY | Facility: HOSPITAL | Age: 68
End: 2018-05-09

## 2018-05-10 ENCOUNTER — HOSPITAL ENCOUNTER (OUTPATIENT)
Dept: MRI IMAGING | Facility: HOSPITAL | Age: 68
Discharge: HOME | End: 2018-05-10
Admitting: GENERAL PRACTICE

## 2018-05-10 DIAGNOSIS — M54.16 LUMBAR BACK PAIN WITH RADICULOPATHY AFFECTING RIGHT LOWER EXTREMITY: ICD-10-CM

## 2018-05-10 DIAGNOSIS — M79.604 RIGHT LEG PAIN: ICD-10-CM

## 2018-05-10 DIAGNOSIS — R20.0 RIGHT LEG NUMBNESS: ICD-10-CM

## 2018-05-10 PROCEDURE — 72148 MRI LUMBAR SPINE W/O DYE: CPT

## 2018-05-11 DIAGNOSIS — M51.26 HERNIATION OF RIGHT SIDE OF L4-L5 INTERVERTEBRAL DISC: Primary | ICD-10-CM

## 2018-05-24 ENCOUNTER — TELEPHONE (OUTPATIENT)
Dept: FAMILY MEDICINE CLINIC | Facility: CLINIC | Age: 68
End: 2018-05-24

## 2018-05-25 ENCOUNTER — OFFICE VISIT (OUTPATIENT)
Dept: FAMILY MEDICINE CLINIC | Facility: CLINIC | Age: 68
End: 2018-05-25

## 2018-05-25 VITALS
WEIGHT: 207 LBS | SYSTOLIC BLOOD PRESSURE: 142 MMHG | OXYGEN SATURATION: 97 % | HEIGHT: 67 IN | HEART RATE: 103 BPM | DIASTOLIC BLOOD PRESSURE: 80 MMHG | BODY MASS INDEX: 32.49 KG/M2

## 2018-05-25 DIAGNOSIS — M54.41 ACUTE RIGHT-SIDED LOW BACK PAIN WITH RIGHT-SIDED SCIATICA: ICD-10-CM

## 2018-05-25 DIAGNOSIS — M79.604 RIGHT LEG PAIN: ICD-10-CM

## 2018-05-25 DIAGNOSIS — R20.0 RIGHT LEG NUMBNESS: ICD-10-CM

## 2018-05-25 PROCEDURE — 99213 OFFICE O/P EST LOW 20 MIN: CPT | Performed by: GENERAL PRACTICE

## 2018-05-25 RX ORDER — METHYLPREDNISOLONE 4 MG/1
TABLET ORAL
Qty: 1 EACH | Refills: 0 | Status: SHIPPED | OUTPATIENT
Start: 2018-05-25 | End: 2018-07-18

## 2018-05-25 RX ORDER — HYDROCODONE BITARTRATE AND ACETAMINOPHEN 7.5; 325 MG/1; MG/1
1 TABLET ORAL EVERY 6 HOURS PRN
Qty: 120 TABLET | Refills: 0 | Status: SHIPPED | OUTPATIENT
Start: 2018-05-25 | End: 2018-07-18

## 2018-05-25 NOTE — PROGRESS NOTES
Subjective   Cari Brooks is a 67 y.o. female.   Chief Complaint   Patient presents with   • Back Pain   • Med Refill   • Leg Pain     7 weeks ago started having pain in right buttock and down the leg almost to foot. Had the flu and had been laying around a lot. Has a history of degenerative disc disease and has had previous back surgery. Pain has not improved and still radiating down right leg. MRI shows herniated disc, awaiting appt with Dr. Argueta.   Back Pain   This is a new problem. The current episode started 1 to 4 weeks ago. The problem occurs constantly. The problem is unchanged. The pain is present in the lumbar spine and gluteal. The quality of the pain is described as aching, burning and cramping. The pain radiates to the right knee and right thigh. The pain is at a severity of 7/10. The pain is severe. The pain is worse during the day. The symptoms are aggravated by bending and position. Stiffness is present in the morning. Associated symptoms include leg pain, numbness, tingling and weakness. Pertinent negatives include no abdominal pain, bladder incontinence, bowel incontinence, chest pain, dysuria, fever, headaches or perianal numbness. She has tried NSAIDs and ice for the symptoms. The treatment provided moderate relief.   Leg Pain    The incident occurred more than 1 week ago. The incident occurred at home. There was no injury mechanism. The pain is present in the right leg. The quality of the pain is described as burning and cramping. The pain is at a severity of 7/10. The pain is severe. The pain has been constant since onset. Associated symptoms include numbness and tingling. Pertinent negatives include no inability to bear weight. She reports no foreign bodies present. The symptoms are aggravated by movement and weight bearing. Treatments tried: hydrocodone. The treatment provided moderate relief.      The following portions of the patient's history were reviewed and updated as  appropriate: allergies, current medications, past social history and problem list.    Outpatient Medications Prior to Visit   Medication Sig Dispense Refill   • amLODIPine (NORVASC) 10 MG tablet Take 1 tablet by mouth Daily. 90 tablet 3   • Cholecalciferol (D3-1000) 1000 UNITS capsule Take 1,000 Units by mouth Daily. cholecalciferol (vitamin D3) 1,000 unit capsule     • hydrOXYzine (ATARAX) 25 MG tablet Take 1 tablet by mouth 2 (Two) Times a Day As Needed for Anxiety. 0.5-1 tablet 2 times per day Oral PRN 30 tablet 3   • losartan-hydrochlorothiazide (HYZAAR) 100-25 MG per tablet Take 1 tablet by mouth Daily. 90 tablet 3   • naproxen sodium (ALEVE) 220 MG tablet Take 220 mg by mouth 4 (Four) Times a Day.     • omeprazole (PRILOSEC) 20 MG capsule Take 1 capsule by mouth Daily. 30 capsule 5   • potassium chloride (MICRO-K) 8 MEQ CR capsule Take 1 capsule by mouth 2 (Two) Times a Day. 180 capsule 3   • HYDROcodone-acetaminophen (NORCO) 7.5-325 MG per tablet Take 1 tablet by mouth Every 6 (Six) Hours As Needed for Moderate Pain . 40 tablet 0   • acetaminophen (TYLENOL) 500 MG tablet Take 500 mg by mouth Every 6 (Six) Hours As Needed for Mild Pain .     • MethylPREDNISolone (MEDROL, LAMBERTO,) 4 MG tablet Take as directed on package instructions. 1 each 0     No facility-administered medications prior to visit.        Review of Systems   Constitutional: Negative for chills, fatigue, fever and unexpected weight change.   HENT: Negative.  Negative for congestion, ear pain, hearing loss, nosebleeds, rhinorrhea, sneezing, sore throat and tinnitus.    Eyes: Negative.  Negative for discharge.   Respiratory: Negative.  Negative for cough, shortness of breath and wheezing.    Cardiovascular: Negative.  Negative for chest pain and palpitations.   Gastrointestinal: Negative.  Negative for abdominal pain, bowel incontinence, constipation, diarrhea, nausea and vomiting.   Endocrine: Negative.    Genitourinary: Negative.  Negative for  "bladder incontinence, dysuria, frequency and urgency.   Musculoskeletal: Positive for back pain. Negative for arthralgias, joint swelling, myalgias and neck pain.   Skin: Negative.  Negative for rash.   Allergic/Immunologic: Negative.    Neurological: Positive for tingling, weakness and numbness. Negative for dizziness and headaches.   Hematological: Negative.  Negative for adenopathy.   Psychiatric/Behavioral: Negative.  Negative for dysphoric mood and sleep disturbance. The patient is not nervous/anxious.        Objective   Visit Vitals  /80   Pulse 103   Ht 170.2 cm (67\")   Wt 93.9 kg (207 lb)   LMP  (LMP Unknown)   SpO2 97%   BMI 32.42 kg/m²     Physical Exam   Constitutional: She is oriented to person, place, and time. She appears well-developed and well-nourished. No distress.   HENT:   Head: Normocephalic and atraumatic.   Nose: Nose normal.   Mouth/Throat: Oropharynx is clear and moist.   Eyes: Conjunctivae and EOM are normal. Pupils are equal, round, and reactive to light. Right eye exhibits no discharge. Left eye exhibits no discharge.   Neck: No thyromegaly present.   Cardiovascular: Normal rate, regular rhythm, normal heart sounds and intact distal pulses.    Pulmonary/Chest: Effort normal and breath sounds normal.   Musculoskeletal:        Lumbar back: She exhibits decreased range of motion and tenderness.   SLR 90 bilat   Lymphadenopathy:     She has no cervical adenopathy.   Neurological: She is alert and oriented to person, place, and time.   Reflex Scores:       Patellar reflexes are 0 on the right side and 2+ on the left side.       Achilles reflexes are 2+ on the right side and 2+ on the left side.  Skin: Skin is warm and dry.   Psychiatric: She has a normal mood and affect.   Nursing note and vitals reviewed.    Assessment/Plan   Problem List Items Addressed This Visit     None      Visit Diagnoses     Right leg pain        Relevant Medications    HYDROcodone-acetaminophen (NORCO) 7.5-325 " MG per tablet    Right leg numbness        Relevant Medications    HYDROcodone-acetaminophen (NORCO) 7.5-325 MG per tablet    Acute right-sided low back pain with right-sided sciatica        Relevant Medications    HYDROcodone-acetaminophen (NORCO) 7.5-325 MG per tablet         Repeat medrol dose pack. Wait for appt with Dr. Argueta. Arsh reviewed and appropriate. Not recommended to drive or operate heavy equipment while taking potentially sedating meds.       New Medications Ordered This Visit   Medications   • MethylPREDNISolone (MEDROL, LAMBERTO,) 4 MG tablet     Sig: Take as directed on package instructions.     Dispense:  1 each     Refill:  0   • HYDROcodone-acetaminophen (NORCO) 7.5-325 MG per tablet     Sig: Take 1 tablet by mouth Every 6 (Six) Hours As Needed for Moderate Pain .     Dispense:  120 tablet     Refill:  0     Return for Next scheduled follow up.

## 2018-06-22 RX ORDER — AMLODIPINE BESYLATE 10 MG/1
TABLET ORAL
Qty: 30 TABLET | Refills: 2 | Status: SHIPPED | OUTPATIENT
Start: 2018-06-22 | End: 2018-07-18 | Stop reason: SDUPTHER

## 2018-06-22 RX ORDER — LOSARTAN POTASSIUM AND HYDROCHLOROTHIAZIDE 25; 100 MG/1; MG/1
TABLET ORAL
Qty: 30 TABLET | Refills: 2 | Status: SHIPPED | OUTPATIENT
Start: 2018-06-22 | End: 2018-07-18 | Stop reason: SDUPTHER

## 2018-07-02 ENCOUNTER — DOCUMENTATION (OUTPATIENT)
Dept: PHYSICAL THERAPY | Facility: HOSPITAL | Age: 68
End: 2018-07-02

## 2018-07-02 DIAGNOSIS — M79.604 RIGHT LEG PAIN: Primary | ICD-10-CM

## 2018-07-02 DIAGNOSIS — M54.41 ACUTE LOW BACK PAIN WITH RIGHT-SIDED SCIATICA, UNSPECIFIED BACK PAIN LATERALITY: ICD-10-CM

## 2018-07-16 ENCOUNTER — LAB (OUTPATIENT)
Dept: LAB | Facility: HOSPITAL | Age: 68
End: 2018-07-16

## 2018-07-16 DIAGNOSIS — I10 ESSENTIAL HYPERTENSION: ICD-10-CM

## 2018-07-16 DIAGNOSIS — Z00.00 ANNUAL PHYSICAL EXAM: ICD-10-CM

## 2018-07-16 DIAGNOSIS — Z00.00 ANNUAL PHYSICAL EXAM: Primary | ICD-10-CM

## 2018-07-16 LAB
ALBUMIN SERPL-MCNC: 4.3 G/DL (ref 3.4–4.8)
ALBUMIN/GLOB SERPL: 1.3 G/DL (ref 1.1–1.8)
ALP SERPL-CCNC: 76 U/L (ref 38–126)
ALT SERPL W P-5'-P-CCNC: 33 U/L (ref 9–52)
ANION GAP SERPL CALCULATED.3IONS-SCNC: 8 MMOL/L (ref 5–15)
ARTICHOKE IGE QN: 99 MG/DL (ref 1–129)
AST SERPL-CCNC: 32 U/L (ref 14–36)
BACTERIA UR QL AUTO: ABNORMAL /HPF
BASOPHILS # BLD AUTO: 0.04 10*3/MM3 (ref 0–0.2)
BASOPHILS NFR BLD AUTO: 0.5 % (ref 0–2)
BILIRUB SERPL-MCNC: 0.6 MG/DL (ref 0.2–1.3)
BILIRUB UR QL STRIP: NEGATIVE
BUN BLD-MCNC: 16 MG/DL (ref 7–21)
BUN/CREAT SERPL: 20.8 (ref 7–25)
CALCIUM SPEC-SCNC: 9.7 MG/DL (ref 8.4–10.2)
CHLORIDE SERPL-SCNC: 102 MMOL/L (ref 95–110)
CHOLEST SERPL-MCNC: 197 MG/DL (ref 0–199)
CLARITY UR: CLEAR
CO2 SERPL-SCNC: 31 MMOL/L (ref 22–31)
COLOR UR: YELLOW
CREAT BLD-MCNC: 0.77 MG/DL (ref 0.5–1)
DEPRECATED RDW RBC AUTO: 40.1 FL (ref 36.4–46.3)
EOSINOPHIL # BLD AUTO: 0.18 10*3/MM3 (ref 0–0.7)
EOSINOPHIL NFR BLD AUTO: 2.2 % (ref 0–7)
ERYTHROCYTE [DISTWIDTH] IN BLOOD BY AUTOMATED COUNT: 13.3 % (ref 11.5–14.5)
GFR SERPL CREATININE-BSD FRML MDRD: 75 ML/MIN/1.73 (ref 45–104)
GLOBULIN UR ELPH-MCNC: 3.2 GM/DL (ref 2.3–3.5)
GLUCOSE BLD-MCNC: 98 MG/DL (ref 60–100)
GLUCOSE UR STRIP-MCNC: NEGATIVE MG/DL
HCT VFR BLD AUTO: 40.4 % (ref 35–45)
HDLC SERPL-MCNC: 50 MG/DL (ref 60–200)
HGB BLD-MCNC: 14.1 G/DL (ref 12–15.5)
HGB UR QL STRIP.AUTO: NEGATIVE
HYALINE CASTS UR QL AUTO: ABNORMAL /LPF
IMM GRANULOCYTES # BLD: 0.01 10*3/MM3 (ref 0–0.02)
IMM GRANULOCYTES NFR BLD: 0.1 % (ref 0–0.5)
KETONES UR QL STRIP: NEGATIVE
LDLC/HDLC SERPL: 2.34 {RATIO} (ref 0–3.22)
LEUKOCYTE ESTERASE UR QL STRIP.AUTO: NEGATIVE
LYMPHOCYTES # BLD AUTO: 3.12 10*3/MM3 (ref 0.6–4.2)
LYMPHOCYTES NFR BLD AUTO: 37.5 % (ref 10–50)
MCH RBC QN AUTO: 29.2 PG (ref 26.5–34)
MCHC RBC AUTO-ENTMCNC: 34.9 G/DL (ref 31.4–36)
MCV RBC AUTO: 83.6 FL (ref 80–98)
MONOCYTES # BLD AUTO: 0.55 10*3/MM3 (ref 0–0.9)
MONOCYTES NFR BLD AUTO: 6.6 % (ref 0–12)
NEUTROPHILS # BLD AUTO: 4.42 10*3/MM3 (ref 2–8.6)
NEUTROPHILS NFR BLD AUTO: 53.1 % (ref 37–80)
NITRITE UR QL STRIP: NEGATIVE
PH UR STRIP.AUTO: 6 [PH] (ref 5–9)
PLATELET # BLD AUTO: 287 10*3/MM3 (ref 150–450)
PMV BLD AUTO: 9.8 FL (ref 8–12)
POTASSIUM BLD-SCNC: 3.6 MMOL/L (ref 3.5–5.1)
PROT SERPL-MCNC: 7.5 G/DL (ref 6.3–8.6)
PROT UR QL STRIP: NEGATIVE
RBC # BLD AUTO: 4.83 10*6/MM3 (ref 3.77–5.16)
RBC # UR: ABNORMAL /HPF
REF LAB TEST METHOD: ABNORMAL
SODIUM BLD-SCNC: 141 MMOL/L (ref 137–145)
SP GR UR STRIP: 1.02 (ref 1–1.03)
SQUAMOUS #/AREA URNS HPF: ABNORMAL /HPF
TRIGL SERPL-MCNC: 151 MG/DL (ref 20–199)
UROBILINOGEN UR QL STRIP: NORMAL
WBC NRBC COR # BLD: 8.32 10*3/MM3 (ref 3.2–9.8)
WBC UR QL AUTO: ABNORMAL /HPF

## 2018-07-16 PROCEDURE — 85025 COMPLETE CBC W/AUTO DIFF WBC: CPT

## 2018-07-16 PROCEDURE — 81001 URINALYSIS AUTO W/SCOPE: CPT

## 2018-07-16 PROCEDURE — 36415 COLL VENOUS BLD VENIPUNCTURE: CPT

## 2018-07-16 PROCEDURE — 80061 LIPID PANEL: CPT

## 2018-07-16 PROCEDURE — 80053 COMPREHEN METABOLIC PANEL: CPT

## 2018-07-17 DIAGNOSIS — Z12.31 ENCOUNTER FOR SCREENING MAMMOGRAM FOR MALIGNANT NEOPLASM OF BREAST: Primary | ICD-10-CM

## 2018-07-18 ENCOUNTER — OFFICE VISIT (OUTPATIENT)
Dept: FAMILY MEDICINE CLINIC | Facility: CLINIC | Age: 68
End: 2018-07-18

## 2018-07-18 VITALS
HEIGHT: 67 IN | DIASTOLIC BLOOD PRESSURE: 80 MMHG | BODY MASS INDEX: 32.21 KG/M2 | WEIGHT: 205.2 LBS | OXYGEN SATURATION: 99 % | SYSTOLIC BLOOD PRESSURE: 130 MMHG | HEART RATE: 90 BPM

## 2018-07-18 DIAGNOSIS — I10 ESSENTIAL HYPERTENSION: Primary | Chronic | ICD-10-CM

## 2018-07-18 DIAGNOSIS — Z12.31 ENCOUNTER FOR SCREENING MAMMOGRAM FOR BREAST CANCER: ICD-10-CM

## 2018-07-18 DIAGNOSIS — M48.061 SPINAL STENOSIS AT L4-L5 LEVEL: ICD-10-CM

## 2018-07-18 DIAGNOSIS — K21.9 GERD WITHOUT ESOPHAGITIS: Chronic | ICD-10-CM

## 2018-07-18 PROCEDURE — 99214 OFFICE O/P EST MOD 30 MIN: CPT | Performed by: GENERAL PRACTICE

## 2018-07-18 RX ORDER — POTASSIUM CHLORIDE 600 MG/1
8 CAPSULE, EXTENDED RELEASE ORAL 2 TIMES DAILY
Qty: 180 CAPSULE | Refills: 3 | Status: SHIPPED | OUTPATIENT
Start: 2018-07-18 | End: 2020-07-14 | Stop reason: SDUPTHER

## 2018-07-18 RX ORDER — AMLODIPINE BESYLATE 10 MG/1
10 TABLET ORAL DAILY
Qty: 90 TABLET | Refills: 3 | Status: SHIPPED | OUTPATIENT
Start: 2018-07-18 | End: 2019-07-22 | Stop reason: SDUPTHER

## 2018-07-18 RX ORDER — LOSARTAN POTASSIUM AND HYDROCHLOROTHIAZIDE 25; 100 MG/1; MG/1
1 TABLET ORAL DAILY
Qty: 90 TABLET | Refills: 3 | Status: SHIPPED | OUTPATIENT
Start: 2018-07-18 | End: 2019-07-22 | Stop reason: SDUPTHER

## 2018-07-18 NOTE — PROGRESS NOTES
Subjective   Cari Brooks is a 67 y.o. female.     Chief Complaint   Patient presents with   • Annual Exam   • Hypertension   • Vitamin D Deficiency     For review and evaluation of management of chronic medical problems. Labs reviewed. Due for mammogram. Recent right L4-5 microdiscectomy and has done well. GERD stable.   Hypertension   This is a chronic problem. The current episode started more than 1 year ago. The problem is unchanged. The problem is controlled. Pertinent negatives include no chest pain, headaches, neck pain, palpitations or shortness of breath. There are no associated agents to hypertension. Current antihypertension treatment includes angiotensin blockers, diuretics and calcium channel blockers. The current treatment provides significant improvement. There are no compliance problems.       The following portions of the patient's history were reviewed and updated as appropriate: allergies, current medications, past family and social history and problem list.    Outpatient Medications Prior to Visit   Medication Sig Dispense Refill   • Cholecalciferol (D3-1000) 1000 UNITS capsule Take 1,000 Units by mouth Daily. cholecalciferol (vitamin D3) 1,000 unit capsule     • hydrOXYzine (ATARAX) 25 MG tablet Take 1 tablet by mouth 2 (Two) Times a Day As Needed for Anxiety. 0.5-1 tablet 2 times per day Oral PRN 30 tablet 3   • omeprazole (PRILOSEC) 20 MG capsule Take 1 capsule by mouth Daily. 30 capsule 5   • amLODIPine (NORVASC) 10 MG tablet TAKE 1 TABLET DAILY FOR BLOOD PRESSURE. 30 tablet 2   • losartan-hydrochlorothiazide (HYZAAR) 100-25 MG per tablet TAKE 1 TABLET DAILY FOR BLOOD PRESSURE. 30 tablet 2   • potassium chloride (MICRO-K) 8 MEQ CR capsule Take 1 capsule by mouth 2 (Two) Times a Day. 180 capsule 3   • HYDROcodone-acetaminophen (NORCO) 7.5-325 MG per tablet Take 1 tablet by mouth Every 6 (Six) Hours As Needed for Moderate Pain . 120 tablet 0   • MethylPREDNISolone (MEDROL, LAMBERTO,) 4 MG  "tablet Take as directed on package instructions. 1 each 0   • naproxen sodium (ALEVE) 220 MG tablet Take 220 mg by mouth 4 (Four) Times a Day.       No facility-administered medications prior to visit.        Review of Systems   Constitutional: Negative.  Negative for chills, fatigue, fever and unexpected weight change.   HENT: Negative.  Negative for congestion, ear pain, hearing loss, nosebleeds, rhinorrhea, sneezing, sore throat and tinnitus.    Eyes: Negative.  Negative for discharge.   Respiratory: Negative.  Negative for cough, shortness of breath and wheezing.    Cardiovascular: Negative.  Negative for chest pain and palpitations.   Gastrointestinal: Negative.  Negative for abdominal pain, constipation, diarrhea, nausea and vomiting.   Endocrine: Negative.    Genitourinary: Negative.  Negative for dysuria, frequency and urgency.   Musculoskeletal: Negative.  Negative for arthralgias, back pain, joint swelling, myalgias and neck pain.   Skin: Negative.  Negative for rash.   Allergic/Immunologic: Negative.    Neurological: Negative.  Negative for dizziness, weakness, numbness and headaches.   Hematological: Negative.  Negative for adenopathy.   Psychiatric/Behavioral: Negative.  Negative for dysphoric mood and sleep disturbance. The patient is not nervous/anxious.      Objective     Visit Vitals  /80 (BP Location: Left arm, Patient Position: Sitting, Cuff Size: Adult)   Pulse 90   Ht 170.2 cm (67\")   Wt 93.1 kg (205 lb 3.2 oz)   LMP  (LMP Unknown)   SpO2 99%   BMI 32.14 kg/m²     Physical Exam   Constitutional: She is oriented to person, place, and time. She appears well-developed and well-nourished. No distress.   HENT:   Head: Normocephalic and atraumatic.   Nose: Nose normal.   Mouth/Throat: Oropharynx is clear and moist.   Eyes: Pupils are equal, round, and reactive to light. Conjunctivae and EOM are normal. Right eye exhibits no discharge. Left eye exhibits no discharge.   Neck: No tracheal deviation " present. No thyromegaly present.   Cardiovascular: Normal rate, regular rhythm, normal heart sounds and intact distal pulses.    No murmur heard.  Pulmonary/Chest: Effort normal and breath sounds normal. No respiratory distress. She has no wheezes. She has no rales. She exhibits no tenderness. Right breast exhibits no inverted nipple, no mass, no nipple discharge, no skin change and no tenderness. Left breast exhibits no inverted nipple, no mass, no nipple discharge, no skin change and no tenderness.   Abdominal: Soft. Bowel sounds are normal. She exhibits no distension and no mass. There is no tenderness. No hernia.   Musculoskeletal: Normal range of motion. She exhibits no deformity.        Back:    Lymphadenopathy:     She has no cervical adenopathy.   Neurological: She is alert and oriented to person, place, and time. She has normal reflexes.   Skin: Skin is warm and dry.   Psychiatric: She has a normal mood and affect. Her behavior is normal. Judgment and thought content normal.   Nursing note and vitals reviewed.    Results for orders placed or performed in visit on 07/16/18   Comprehensive Metabolic Panel   Result Value Ref Range    Glucose 98 60 - 100 mg/dL    BUN 16 7 - 21 mg/dL    Creatinine 0.77 0.50 - 1.00 mg/dL    Sodium 141 137 - 145 mmol/L    Potassium 3.6 3.5 - 5.1 mmol/L    Chloride 102 95 - 110 mmol/L    CO2 31.0 22.0 - 31.0 mmol/L    Calcium 9.7 8.4 - 10.2 mg/dL    Total Protein 7.5 6.3 - 8.6 g/dL    Albumin 4.30 3.40 - 4.80 g/dL    ALT (SGPT) 33 9 - 52 U/L    AST (SGOT) 32 14 - 36 U/L    Alkaline Phosphatase 76 38 - 126 U/L    Total Bilirubin 0.6 0.2 - 1.3 mg/dL    eGFR Non  Amer 75 45 - 104 mL/min/1.73    Globulin 3.2 2.3 - 3.5 gm/dL    A/G Ratio 1.3 1.1 - 1.8 g/dL    BUN/Creatinine Ratio 20.8 7.0 - 25.0    Anion Gap 8.0 5.0 - 15.0 mmol/L   Lipid Panel   Result Value Ref Range    Total Cholesterol 197 0 - 199 mg/dL    Triglycerides 151 20 - 199 mg/dL    HDL Cholesterol 50 (L) 60 - 200  mg/dL    LDL Cholesterol  99 1 - 129 mg/dL    LDL/HDL Ratio 2.34 0.00 - 3.22   CBC Auto Differential   Result Value Ref Range    WBC 8.32 3.20 - 9.80 10*3/mm3    RBC 4.83 3.77 - 5.16 10*6/mm3    Hemoglobin 14.1 12.0 - 15.5 g/dL    Hematocrit 40.4 35.0 - 45.0 %    MCV 83.6 80.0 - 98.0 fL    MCH 29.2 26.5 - 34.0 pg    MCHC 34.9 31.4 - 36.0 g/dL    RDW 13.3 11.5 - 14.5 %    RDW-SD 40.1 36.4 - 46.3 fl    MPV 9.8 8.0 - 12.0 fL    Platelets 287 150 - 450 10*3/mm3    Neutrophil % 53.1 37.0 - 80.0 %    Lymphocyte % 37.5 10.0 - 50.0 %    Monocyte % 6.6 0.0 - 12.0 %    Eosinophil % 2.2 0.0 - 7.0 %    Basophil % 0.5 0.0 - 2.0 %    Immature Grans % 0.1 0.0 - 0.5 %    Neutrophils, Absolute 4.42 2.00 - 8.60 10*3/mm3    Lymphocytes, Absolute 3.12 0.60 - 4.20 10*3/mm3    Monocytes, Absolute 0.55 0.00 - 0.90 10*3/mm3    Eosinophils, Absolute 0.18 0.00 - 0.70 10*3/mm3    Basophils, Absolute 0.04 0.00 - 0.20 10*3/mm3    Immature Grans, Absolute 0.01 0.00 - 0.02 10*3/mm3   Urinalysis without microscopic (no culture) - Urine, Clean Catch   Result Value Ref Range    Color, UA Yellow Yellow, Straw, Dark Yellow, Nargis    Appearance, UA Clear Clear    pH, UA 6.0 5.0 - 9.0    Specific Gravity, UA 1.016 1.003 - 1.030    Glucose, UA Negative Negative    Ketones, UA Negative Negative    Bilirubin, UA Negative Negative    Blood, UA Negative Negative    Protein, UA Negative Negative    Leuk Esterase, UA Negative Negative    Nitrite, UA Negative Negative    Urobilinogen, UA 0.2 E.U./dL 0.2 - 1.0 E.U./dL   Urinalysis, Microscopic Only - Urine, Clean Catch   Result Value Ref Range    RBC, UA 0-2 (A) None Seen /HPF    WBC, UA 0-2 None Seen, 0-2, 3-5 /HPF    Bacteria, UA None Seen None Seen /HPF    Squamous Epithelial Cells, UA None Seen None Seen, 0-2 /HPF    Hyaline Casts, UA 0-2 None Seen /LPF    Methodology Automated Microscopy       Assessment/Plan   Problem List Items Addressed This Visit        Cardiovascular and Mediastinum    Essential  hypertension - Primary (Chronic)    Relevant Medications    losartan-hydrochlorothiazide (HYZAAR) 100-25 MG per tablet    amLODIPine (NORVASC) 10 MG tablet      Other Visit Diagnoses     GERD without esophagitis  (Chronic)       Encounter for screening mammogram for breast cancer        Spinal stenosis at L4-L5 level        recent surgery         Will notify regarding results. Continue current treatment.     New Medications Ordered This Visit   Medications   • losartan-hydrochlorothiazide (HYZAAR) 100-25 MG per tablet     Sig: Take 1 tablet by mouth Daily. for blood pressure     Dispense:  90 tablet     Refill:  3   • amLODIPine (NORVASC) 10 MG tablet     Sig: Take 1 tablet by mouth Daily. for blood pressure     Dispense:  90 tablet     Refill:  3   • potassium chloride (MICRO-K) 8 MEQ CR capsule     Sig: Take 1 capsule by mouth 2 (Two) Times a Day.     Dispense:  180 capsule     Refill:  3     Return in about 5 months (around 12/14/2018) for Recheck, medicare wellness visit.

## 2018-08-08 DIAGNOSIS — M25.562 LEFT KNEE PAIN, UNSPECIFIED CHRONICITY: Primary | ICD-10-CM

## 2018-08-09 ENCOUNTER — OFFICE VISIT (OUTPATIENT)
Dept: ORTHOPEDIC SURGERY | Facility: CLINIC | Age: 68
End: 2018-08-09

## 2018-08-09 VITALS — WEIGHT: 207.2 LBS | BODY MASS INDEX: 32.52 KG/M2 | HEIGHT: 67 IN

## 2018-08-09 DIAGNOSIS — M23.92 INTERNAL DERANGEMENT OF KNEE JOINT, LEFT: ICD-10-CM

## 2018-08-09 DIAGNOSIS — I10 ESSENTIAL HYPERTENSION: ICD-10-CM

## 2018-08-09 DIAGNOSIS — M25.562 LEFT KNEE PAIN, UNSPECIFIED CHRONICITY: Primary | ICD-10-CM

## 2018-08-09 PROCEDURE — 99213 OFFICE O/P EST LOW 20 MIN: CPT | Performed by: ORTHOPAEDIC SURGERY

## 2018-08-09 RX ORDER — NAPROXEN SODIUM 220 MG
220 TABLET ORAL 2 TIMES DAILY PRN
COMMUNITY
End: 2018-10-05

## 2018-08-09 NOTE — PROGRESS NOTES
"Cari Brooks is a 67 y.o. female returns for     Chief Complaint   Patient presents with   • Left Knee - Follow-up       HISTORY OF PRESENT ILLNESS: Patient being seen for left knee follow up. X-rays done today. Patient has history of left knee arthroscopy with debridement of medial meniscus 12/16/2015 per Dr. Zapien. Patient states she experiences more pain during weight bearing. She reports no injury. Patient states pain has increased since back surgery approximately 2 months prior. Pain has been intermittent since fall 2017.  She reports no new injury.  She did well after knee scope for approximately 1-1-1/2 years.  She has pain with prolonged walking and pain with pivoting and twisting.  She has been having pain that has been worsening in severity over the last 6 months.  She is unable to stand or walk for very long.  She has been on steroids for approximately 3 months and states that she has not had any significant improvement during this time.  She is taking Aleve in the mornings for anti-inflammatory.     CONCURRENT MEDICAL HISTORY:    The following portions of the patient's history were reviewed and updated as appropriate: allergies, current medications, past family history, past medical history, past social history, past surgical history and problem list.     ROS  No fevers or chills.  No chest pain or shortness of air.  No GI or  disturbances.    PHYSICAL EXAMINATION:       Ht 170.2 cm (67\")   Wt 94 kg (207 lb 3.2 oz)   LMP  (LMP Unknown)   BMI 32.45 kg/m²     Physical Exam   Constitutional: She is oriented to person, place, and time. She appears well-developed and well-nourished.   Musculoskeletal:        Left knee: She exhibits no effusion.   Neurological: She is alert and oriented to person, place, and time.   Psychiatric: She has a normal mood and affect. Her behavior is normal. Judgment and thought content normal.       GAIT:     [x]  Normal  []  Antalgic    Assistive device: [x]  None  []  " Walker     []  Crutches  []  Cane     []  Wheelchair  []  Stretcher    Left Knee Exam     Tenderness   The patient is experiencing tenderness in the medial joint line and medial retinaculum.    Range of Motion   The patient has normal left knee ROM.  Extension: -5   Flexion: 110     Muscle Strength     The patient has normal left knee strength.    Tests   Drawer:       Anterior - negative     Posterior - negative  Varus: positive        Other   Erythema: absent  Sensation: normal  Pulse: present  Swelling: none  Effusion: no effusion present                Xr Knee 1 Or 2 View Left    Result Date: 8/9/2018  Narrative: Ordering Provider:  Nathen Zapien MD Ordering Diagnosis/Indication:  Left knee pain, unspecified chronicity Procedure:  XR KNEE 1 OR 2 VW LEFT Exam Date:  8/9/18 COMPARISON:  Todays X-rays were compared to previous images dated September 3, 2015.     Impression:  AP bilateral standing of the knees with lateral of the left knee shows mild medial joint space narrowing and mild varus positioning of the left knee.  Marginal osteophyte is noted along the medial aspect of the joint.  Moderate maintenance of joint spacing is noted as well.  No acute bony abnormality is noted.  Right knee shows acceptable position and alignment with no significant arthritic change and no acute finding.  A lateral view of the left knee shows acceptable alignment with mild arthritic change noted in the patellofemoral compartment. Mild progression of the arthritic changes noted in comparison to prior x-ray of the left knee. Nathen Zapien MD 8/9/18     Xr Knee Bilateral Ap Standing    Result Date: 8/9/2018  Narrative: Ordering Provider:  Nathen Zapien MD Ordering Diagnosis/Indication:  Left knee pain, unspecified chronicity Procedure:  XR KNEE BILATERAL AP STANDING Exam Date:  8/9/18 COMPARISON:  Todays X-rays were compared to previous images dated September 3, 2015.     Impression:  AP bilateral  standing of the knees with lateral of the left knee shows mild medial joint space narrowing and mild varus positioning of the left knee.  Marginal osteophyte is noted along the medial aspect of the joint.  Moderate maintenance of joint spacing is noted as well.  No acute bony abnormality is noted.  Right knee shows acceptable position and alignment with no significant arthritic change and no acute finding.  A lateral view of the left knee shows acceptable alignment with mild arthritic change noted in the patellofemoral compartment. Mild progression of the arthritic changes noted in comparison to prior x-ray of the left knee. Nathen Zapien MD 8/9/18             ASSESSMENT:    Diagnoses and all orders for this visit:    Left knee pain, unspecified chronicity    Internal derangement of knee joint, left    Essential hypertension    Other orders  -     naproxen sodium (ALEVE) 220 MG tablet; Take 220 mg by mouth 2 (Two) Times a Day As Needed.          PLAN    She has pain with pivoting and twisting and pain along the medial joint line of the left knee.  We discussed beginning Aleve twice a day.  We also discussed activity modification.  She has symptoms consistent with medial meniscus tear and has exam consistent with medial meniscus tear.  The plan is for an MRI of the left knee for further assessment of the articular space and to help determine further treatment options.  She had resumed previous physical therapy exercises at home and therefore I do not think that formal physical therapy will make a difference.    Patient's Body mass index is 32.45 kg/m². BMI is above normal parameters. Recommendations include: exercise counseling and nutrition counseling.      Return for recheck for MRI results.    Nathen Zapien MD

## 2018-08-15 ENCOUNTER — HOSPITAL ENCOUNTER (OUTPATIENT)
Dept: MRI IMAGING | Facility: HOSPITAL | Age: 68
Discharge: HOME | End: 2018-08-15
Attending: ORTHOPAEDIC SURGERY | Admitting: ORTHOPAEDIC SURGERY

## 2018-08-15 DIAGNOSIS — M25.562 LEFT KNEE PAIN, UNSPECIFIED CHRONICITY: ICD-10-CM

## 2018-08-15 DIAGNOSIS — M23.92 INTERNAL DERANGEMENT OF KNEE JOINT, LEFT: ICD-10-CM

## 2018-08-15 DIAGNOSIS — I10 ESSENTIAL HYPERTENSION: ICD-10-CM

## 2018-08-15 PROCEDURE — 73721 MRI JNT OF LWR EXTRE W/O DYE: CPT

## 2018-08-21 ENCOUNTER — OFFICE VISIT (OUTPATIENT)
Dept: ORTHOPEDIC SURGERY | Facility: CLINIC | Age: 68
End: 2018-08-21

## 2018-08-21 VITALS — WEIGHT: 210.2 LBS | BODY MASS INDEX: 32.99 KG/M2 | HEIGHT: 67 IN

## 2018-08-21 DIAGNOSIS — M23.92 INTERNAL DERANGEMENT OF KNEE JOINT, LEFT: Primary | ICD-10-CM

## 2018-08-21 DIAGNOSIS — M25.562 LEFT KNEE PAIN, UNSPECIFIED CHRONICITY: ICD-10-CM

## 2018-08-21 DIAGNOSIS — I10 ESSENTIAL HYPERTENSION: ICD-10-CM

## 2018-08-21 DIAGNOSIS — K21.9 GERD WITHOUT ESOPHAGITIS: ICD-10-CM

## 2018-08-21 PROCEDURE — 99214 OFFICE O/P EST MOD 30 MIN: CPT | Performed by: ORTHOPAEDIC SURGERY

## 2018-08-21 RX ORDER — CLINDAMYCIN PHOSPHATE 900 MG/50ML
900 INJECTION INTRAVENOUS ONCE
Status: CANCELLED | OUTPATIENT
Start: 2018-10-15 | End: 2018-10-15

## 2018-08-21 NOTE — PROGRESS NOTES
"Cari Brooks is a 67 y.o. female returns for     Chief Complaint   Patient presents with   • Left Knee - Follow-up   • Results     MRI       HISTORY OF PRESENT ILLNESS: Patient being seen for left knee follow up. MRI done at , would like to discuss findings.   Pain with activity.  Pain with pivoting and twisting.  Pain with deep knee bends.  She is having some pain with activities of daily living as well.       CONCURRENT MEDICAL HISTORY:    The following portions of the patient's history were reviewed and updated as appropriate: allergies, current medications, past family history, past medical history, past social history, past surgical history and problem list.     ROS  No fevers or chills.  No chest pain or shortness of air.  No GI or  disturbances.    PHYSICAL EXAMINATION:       Ht 170.2 cm (67\")   Wt 95.3 kg (210 lb 3.2 oz)   LMP  (LMP Unknown)   BMI 32.92 kg/m²     Physical Exam   Constitutional: She is oriented to person, place, and time. She appears well-developed and well-nourished. No distress.   Cardiovascular: Normal rate, regular rhythm and normal heart sounds.    Pulmonary/Chest: Effort normal and breath sounds normal.   Abdominal: Soft. Bowel sounds are normal.   Musculoskeletal:        Right knee: She exhibits no effusion.        Left knee: She exhibits no effusion.   Neurological: She is alert and oriented to person, place, and time.   Psychiatric: She has a normal mood and affect. Her behavior is normal. Judgment and thought content normal.   Vitals reviewed.      GAIT:     []  Normal  [x]  Antalgic    Assistive device: [x]  None  []  Walker     []  Crutches  []  Cane     []  Wheelchair  []  Stretcher    Right Knee Exam     Tenderness   The patient is experiencing no tenderness.         Range of Motion   The patient has normal right knee ROM.    Muscle Strength     The patient has normal right knee strength.    Tests   Drawer:       Anterior - negative      Varus: negative  Valgus: " negative    Other   Erythema: absent  Sensation: normal  Pulse: present  Swelling: none  Other tests: no effusion present      Left Knee Exam     Tenderness   The patient is experiencing tenderness in the medial joint line and medial retinaculum.    Range of Motion   The patient has normal left knee ROM.  Extension: -5   Flexion: 110     Muscle Strength     The patient has normal left knee strength.    Tests   Drawer:       Anterior - negative     Posterior - negative  Varus: positive        Other   Erythema: absent  Sensation: normal  Pulse: present  Swelling: none  Effusion: no effusion present              Xr Knee 1 Or 2 View Left    Result Date: 8/9/2018  Narrative: Ordering Provider:  Nathen Zapien MD Ordering Diagnosis/Indication:  Left knee pain, unspecified chronicity Procedure:  XR KNEE 1 OR 2 VW LEFT Exam Date:  8/9/18 COMPARISON:  Todays X-rays were compared to previous images dated September 3, 2015.     Impression:  AP bilateral standing of the knees with lateral of the left knee shows mild medial joint space narrowing and mild varus positioning of the left knee.  Marginal osteophyte is noted along the medial aspect of the joint.  Moderate maintenance of joint spacing is noted as well.  No acute bony abnormality is noted.  Right knee shows acceptable position and alignment with no significant arthritic change and no acute finding.  A lateral view of the left knee shows acceptable alignment with mild arthritic change noted in the patellofemoral compartment. Mild progression of the arthritic changes noted in comparison to prior x-ray of the left knee. Nathen Zapien MD 8/9/18     Xr Knee Bilateral Ap Standing    Result Date: 8/9/2018  Narrative: Ordering Provider:  Nathen Zapien MD Ordering Diagnosis/Indication:  Left knee pain, unspecified chronicity Procedure:  XR KNEE BILATERAL AP STANDING Exam Date:  8/9/18 COMPARISON:  Todays X-rays were compared to previous images dated  September 3, 2015.     Impression:  AP bilateral standing of the knees with lateral of the left knee shows mild medial joint space narrowing and mild varus positioning of the left knee.  Marginal osteophyte is noted along the medial aspect of the joint.  Moderate maintenance of joint spacing is noted as well.  No acute bony abnormality is noted.  Right knee shows acceptable position and alignment with no significant arthritic change and no acute finding.  A lateral view of the left knee shows acceptable alignment with mild arthritic change noted in the patellofemoral compartment. Mild progression of the arthritic changes noted in comparison to prior x-ray of the left knee. Nathen Zapien MD 8/9/18     Mri Knee Left Without Contrast    Result Date: 8/16/2018  Narrative: MRI left knee without contrast on 8/15/2018 CLINICAL INDICATION: Medial left knee pain TECHNIQUE: Multiplanar, multisequence MR images are obtained throughout the left knee without the administration of contrast. This examination was performed on a 1.5 Cherrie magnet. COMPARISON: Left knee x-ray from 8/9/2018 and prior MRI from 11/3/2015 FINDINGS: There is a small joint effusion. There is a small Baker's cyst. Presacral edema is noted in the prepatellar region. The patellar and quadriceps tendons are intact and unremarkable. Popliteus tendon is intact and unremarkable. There is joint space narrowing in the medial compartment with osteophyte formation along the femoral condyles and medial tibial plateau consistent with changes of osteoarthritis. The medial meniscus is extruded medially which bows out the MCL but the MCL is intact. The lateral collateral ligament complex is intact and unremarkable. The ACL and PCL are intact and unremarkable. There is a worsening tear of the posterior horn of the medial meniscus with bucket-handle type tear now with some of the meniscus flipped into the intercondylar notch below the PCL with a double PCL sign  seen. Lateral meniscus appears intact without evidence of tear. There is chondromalacia with underlying reactive marrow change in the medial femoral condyle and medial tibial plateau. Bone marrow signal is otherwise unremarkable. No other cartilage abnormality is noted.     Impression: 1. Changes of osteoarthritis worse in the medial compartment with also chondromalacia and underlying reactive marrow change in the medial compartment. 2. Bucket-handle type tear of the posterior horn of the medial meniscus. 3. Small joint effusion and small Baker's cyst. Electronically signed by:  Luke Yoder  8/16/2018 7:08 AM CDT Workstation: [id number]            ASSESSMENT:    Diagnoses and all orders for this visit:    Internal derangement of knee joint, left  -     Case Request; Standing  -     clindamycin (CLEOCIN) 900 mg in dextrose (D5W) 5 % 100 mL IVPB; Infuse 900 mg into a venous catheter 1 (One) Time.  -     Case Request    Left knee pain, unspecified chronicity  -     Case Request; Standing  -     clindamycin (CLEOCIN) 900 mg in dextrose (D5W) 5 % 100 mL IVPB; Infuse 900 mg into a venous catheter 1 (One) Time.  -     Case Request    Essential hypertension  -     Case Request; Standing  -     clindamycin (CLEOCIN) 900 mg in dextrose (D5W) 5 % 100 mL IVPB; Infuse 900 mg into a venous catheter 1 (One) Time.  -     Case Request    GERD without esophagitis  -     Case Request; Standing  -     clindamycin (CLEOCIN) 900 mg in dextrose (D5W) 5 % 100 mL IVPB; Infuse 900 mg into a venous catheter 1 (One) Time.  -     Case Request    Other orders  -     Follow Anesthesia Guidelines / Standing Orders; Future  -     Provide instructions to patient regarding NPO status  -     Follow Anesthesia Guidelines / Standing Orders; Standing  -     Verify NPO Status; Standing  -     POC Glucose Once; Standing  -     Clip operative site; Standing  -     Obtain informed consent (if not collected inpatient or PAT); Standing  -     NPO After  Midnight          PLAN    The patient voiced understanding of the risks, benefits, and alternative forms of treatment that were discussed and the patient consents to proceed with surgery.  All risks, benefits and alternatives were discussed.  Risks including to but not exclusive to anesthetic complications, including death, MI, CVA, infection, bleeding DVT, fracture, residual pain and need for future surgery.  This discussion was held with the patient by Nathen Zapien MD and all questions were answered.    Patient's been having pain off and on for over a year.  She continues to have difficulties with pivoting and twisting and mechanical symptoms.  We discussed the possibility of continued observation or arthroscopic intervention.  She does have some arthritic changes but the majority of her symptoms seem to be related to mechanical symptoms.    We discussed proceeding with arthroscopy of the left knee with debridement of medial meniscus.    Patient's Body mass index is 32.92 kg/m². BMI is above normal parameters. Recommendations include: exercise counseling and nutrition counseling.      Return for Post-operative eval.    Nathen Zapien MD

## 2018-08-23 PROBLEM — M23.92 INTERNAL DERANGEMENT OF KNEE JOINT, LEFT: Status: ACTIVE | Noted: 2018-08-23

## 2018-08-23 PROBLEM — K21.9 GERD WITHOUT ESOPHAGITIS: Status: ACTIVE | Noted: 2018-08-23

## 2018-08-23 PROBLEM — M25.562 LEFT KNEE PAIN: Status: ACTIVE | Noted: 2018-08-23

## 2018-10-05 ENCOUNTER — APPOINTMENT (OUTPATIENT)
Dept: PREADMISSION TESTING | Facility: HOSPITAL | Age: 68
End: 2018-10-05

## 2018-10-05 ENCOUNTER — OFFICE VISIT (OUTPATIENT)
Dept: ORTHOPEDIC SURGERY | Facility: CLINIC | Age: 68
End: 2018-10-05

## 2018-10-05 VITALS
SYSTOLIC BLOOD PRESSURE: 144 MMHG | OXYGEN SATURATION: 97 % | HEIGHT: 67 IN | RESPIRATION RATE: 14 BRPM | WEIGHT: 206 LBS | HEART RATE: 67 BPM | BODY MASS INDEX: 32.33 KG/M2 | DIASTOLIC BLOOD PRESSURE: 79 MMHG

## 2018-10-05 VITALS — BODY MASS INDEX: 32.65 KG/M2 | HEIGHT: 67 IN | WEIGHT: 208 LBS

## 2018-10-05 DIAGNOSIS — S83.212D BUCKET HANDLE TEAR OF MEDIAL MENISCUS OF LEFT KNEE, SUBSEQUENT ENCOUNTER: Primary | ICD-10-CM

## 2018-10-05 DIAGNOSIS — M23.92 INTERNAL DERANGEMENT OF KNEE JOINT, LEFT: ICD-10-CM

## 2018-10-05 DIAGNOSIS — K21.9 GERD WITHOUT ESOPHAGITIS: ICD-10-CM

## 2018-10-05 DIAGNOSIS — I10 ESSENTIAL HYPERTENSION: ICD-10-CM

## 2018-10-05 DIAGNOSIS — M25.562 LEFT KNEE PAIN, UNSPECIFIED CHRONICITY: ICD-10-CM

## 2018-10-05 LAB
ANION GAP SERPL CALCULATED.3IONS-SCNC: 11 MMOL/L (ref 5–15)
BUN BLD-MCNC: 17 MG/DL (ref 7–21)
BUN/CREAT SERPL: 22.1 (ref 7–25)
CALCIUM SPEC-SCNC: 9.8 MG/DL (ref 8.4–10.2)
CHLORIDE SERPL-SCNC: 98 MMOL/L (ref 95–110)
CO2 SERPL-SCNC: 29 MMOL/L (ref 22–31)
CREAT BLD-MCNC: 0.77 MG/DL (ref 0.5–1)
GFR SERPL CREATININE-BSD FRML MDRD: 75 ML/MIN/1.73 (ref 45–104)
GLUCOSE BLD-MCNC: 95 MG/DL (ref 60–100)
POTASSIUM BLD-SCNC: 4 MMOL/L (ref 3.5–5.1)
SODIUM BLD-SCNC: 138 MMOL/L (ref 137–145)

## 2018-10-05 PROCEDURE — 36415 COLL VENOUS BLD VENIPUNCTURE: CPT

## 2018-10-05 PROCEDURE — 80048 BASIC METABOLIC PNL TOTAL CA: CPT | Performed by: ANESTHESIOLOGY

## 2018-10-05 PROCEDURE — 99214 OFFICE O/P EST MOD 30 MIN: CPT | Performed by: ORTHOPAEDIC SURGERY

## 2018-10-05 PROCEDURE — 93005 ELECTROCARDIOGRAM TRACING: CPT

## 2018-10-05 PROCEDURE — 93010 ELECTROCARDIOGRAM REPORT: CPT | Performed by: INTERNAL MEDICINE

## 2018-10-05 RX ORDER — OMEPRAZOLE 20 MG/1
20 CAPSULE, DELAYED RELEASE ORAL DAILY PRN
COMMUNITY
End: 2019-05-10 | Stop reason: SDUPTHER

## 2018-10-05 RX ORDER — SODIUM CHLORIDE, SODIUM GLUCONATE, SODIUM ACETATE, POTASSIUM CHLORIDE, AND MAGNESIUM CHLORIDE 526; 502; 368; 37; 30 MG/100ML; MG/100ML; MG/100ML; MG/100ML; MG/100ML
1000 INJECTION, SOLUTION INTRAVENOUS CONTINUOUS
Status: CANCELLED | OUTPATIENT
Start: 2018-10-15

## 2018-10-05 NOTE — DISCHARGE INSTRUCTIONS
[facility]  Pre-op Information and Guidelines    You will be called after 2 p.m. the day before your surgery (Friday for Monday surgery) and notified of your time for arrival and approximate surgery time.  If you have not received a call by 4P.M., please contact Same Day Surgery at [phone] of if outside [facility] call [phone].    Please Follow these Important Safety Guidelines:    • The morning of your procedure, take only the medications listed below with   A sip of water:___AMLODIPINE, NASAL SPRAY, OMEPRAZOLE,__________       __SUDAFED (IF NEEDED)_____________________________    • DO NOT eat or drink anything after 12:00 midnight the night before surgery  Specific instructions concerning drinking clear liquids will be discussed during  the pre-surgery instruction call the day before your surgery.    • If you take a blood thinner (ex. Plavix, Coumadin, aspirin), ask your doctor when to stop it before surgery  STOP DATE: _________________    • Only 2 visitors are allowed in patient rooms at a time  Your visitors will be asked to wait in the lobby until the admission process is complete with the exception of a parent with a child and patients in need of special assistance.    • YOU CANNOT DRIVE YOURSELF HOME  You must be accompanied by someone who will be responsible for driving you home after surgery and for your care at home.    • DO NOT chew gum, use breath mints, hard candy, or smoke the day of surgery  • DO NOT drink alcohol for at least 24 hours before your surgery  • DO NOT wear any jewelry and remove all body piercing before coming to the hospital  • DO NOT wear make-up to the hospital  • If you are having surgery on an extremity (arm/leg/foot) remove nail polish/artificial nails on the surgical side  • Clothing, glasses, contacts, dentures, and hairpieces must be removed before surgery  • Bathe the night before or the morning of your surgery and do not use  powders/lotions on skin.

## 2018-10-05 NOTE — PROGRESS NOTES
Cari Brooks is a 67 y.o. female returns for     Chief Complaint   Patient presents with   • Left Knee - Pre-op Exam       HISTORY OF PRESENT ILLNESS:  Patient here for pre op evaluation for left knee arthroscopy. Scheduled for 10/15/18.  No fevers or chills.  No new injury.  She has pain with pivoting and twisting and pain with prolonged standing and walking.  No numbness or tingling.       CONCURRENT MEDICAL HISTORY:    Past Medical History:   Diagnosis Date   • Acute maxillary sinusitis    • Acute sinusitis    • Allergic rhinitis    • Anxiety state    • Asthma    • Bronchopneumonia    • Contact dermatitis due to plant    • Cough    • Edema of lower extremity    • Encounter for general adult medical examination without abnormal findings    • Encounter for immunization    • Encounter for routine adult health examination    • Equinus contracture of ankle    • Essential hypertension    • Fever    • GERD (gastroesophageal reflux disease)    • Heel pain    • Knee pain    • Osteoarthritis    • Otalgia    • Pain in limb    • Plantar fasciitis    • PONV (postoperative nausea and vomiting)    • Routine general medical examination at health care facility    • Screening for malignant neoplasm of colon    • Screening mammogram, encounter for    • Synovial cyst of knee    • Vitamin D deficiency        Allergies   Allergen Reactions   • Zestril [Lisinopril] Shortness Of Breath   • Penicillins Rash       Current Outpatient Prescriptions on File Prior to Visit   Medication Sig   • amLODIPine (NORVASC) 10 MG tablet Take 1 tablet by mouth Daily. for blood pressure   • budesonide (RINOCORT AQUA) 32 MCG/ACT nasal spray 2 sprays into the nostril(s) as directed by provider Daily.   • Cholecalciferol (D3-1000) 1000 UNITS capsule Take 1,000 Units by mouth Daily. cholecalciferol (vitamin D3) 1,000 unit capsule   • losartan-hydrochlorothiazide (HYZAAR) 100-25 MG per tablet Take 1 tablet by mouth Daily. for blood pressure   •  potassium chloride (MICRO-K) 8 MEQ CR capsule Take 1 capsule by mouth 2 (Two) Times a Day.   • promethazine-dextromethorphan (PROMETHAZINE-DM) 6.25-15 MG/5ML syrup Take 5 mL by mouth Every 6 (Six) Hours As Needed for Cough.   • pseudoephedrine (SUDAFED) 30 MG tablet Take 1 tablet by mouth 2 (Two) Times a Day As Needed for Congestion.   • omeprazole (priLOSEC) 20 MG capsule Take 20 mg by mouth Daily As Needed.     No current facility-administered medications on file prior to visit.        Past Surgical History:   Procedure Laterality Date   • BACK SURGERY  06/2018   • ENDOSCOPY  11/13/2012    Colon endoscopy 51937 (2)    Mild diverticulosis in sigmoid colon. 1 polyp in colon 35 cm from entry & 1 polyp in colon 15 cm from entry; both removed by cold biopsy polypectomy. Hemorrhoids found.    • HYSTERECTOMY  1986    Hysterectomy w/ oophorectomy (1)     • INJECTION OF MEDICATION  09/03/2015    Kenalog (3)      • KNEE ARTHROSCOPY     • LAPAROSCOPIC CHOLECYSTECTOMY  10/04/2001   • OOPHORECTOMY     • PAP SMEAR  02/12/2009   • REPAIR ANKLE LIGAMENT  10/12/2004   • TONSILLECTOMY  06/20/1956       Family History   Problem Relation Age of Onset   • Cancer Mother         breast   • Breast cancer Mother    • Arthritis Mother    • Hypertension Mother    • ALS Father    • Cancer Father    • Hypertension Father    • Diabetes Other    • Hypertension Other    • Cancer Other         stomach       Social History     Social History   • Marital status:      Spouse name: N/A   • Number of children: N/A   • Years of education: N/A     Occupational History   • Not on file.     Social History Main Topics   • Smoking status: Never Smoker   • Smokeless tobacco: Never Used   • Alcohol use No   • Drug use: No   • Sexual activity: Defer     Other Topics Concern   • Not on file     Social History Narrative   • No narrative on file         ROS  No fevers or chills.  No chest pain or shortness of air.  No GI or  disturbances.    PHYSICAL  "EXAMINATION:       Ht 170.2 cm (67\")   Wt 94.3 kg (208 lb)   LMP  (LMP Unknown)   BMI 32.58 kg/m²     Physical Exam   Constitutional: She is oriented to person, place, and time. She appears well-developed and well-nourished. No distress.   Cardiovascular: Normal rate, regular rhythm and normal heart sounds.    Pulmonary/Chest: Effort normal and breath sounds normal.   Abdominal: Soft. Bowel sounds are normal.   Musculoskeletal:        Right knee: She exhibits no effusion.        Left knee: She exhibits no effusion.   Neurological: She is alert and oriented to person, place, and time.   Psychiatric: She has a normal mood and affect. Her behavior is normal. Judgment and thought content normal.   Vitals reviewed.      GAIT:     [x]  Normal  []  Antalgic    Assistive device: [x]  None  []  Walker     []  Crutches  []  Cane     []  Wheelchair  []  Stretcher    Right Knee Exam     Tenderness   The patient is experiencing no tenderness.         Range of Motion   The patient has normal right knee ROM.    Muscle Strength     The patient has normal right knee strength.    Tests   Drawer:       Anterior - negative      Varus: negative  Valgus: negative    Other   Erythema: absent  Sensation: normal  Pulse: present  Swelling: none  Other tests: no effusion present      Left Knee Exam     Tenderness   The patient is experiencing tenderness in the medial joint line and medial retinaculum.    Range of Motion   The patient has normal left knee ROM.  Extension: -5   Flexion: 110     Muscle Strength     The patient has normal left knee strength.    Tests   Drawer:       Anterior - negative     Posterior - negative  Varus: positive        Other   Erythema: absent  Sensation: normal  Pulse: present  Swelling: none  Effusion: no effusion present                     MRI left knee without contrast on 8/15/2018     CLINICAL INDICATION: Medial left knee pain     TECHNIQUE: Multiplanar, multisequence MR images are obtained  throughout the " left knee without the administration of contrast.   This examination was performed on a 1.5 Cherrie magnet.     COMPARISON: Left knee x-ray from 8/9/2018 and prior MRI from  11/3/2015     FINDINGS: There is a small joint effusion. There is a small  Baker's cyst. Presacral edema is noted in the prepatellar region.  The patellar and quadriceps tendons are intact and unremarkable.  Popliteus tendon is intact and unremarkable. There is joint space  narrowing in the medial compartment with osteophyte formation  along the femoral condyles and medial tibial plateau consistent  with changes of osteoarthritis. The medial meniscus is extruded  medially which bows out the MCL but the MCL is intact. The  lateral collateral ligament complex is intact and unremarkable.  The ACL and PCL are intact and unremarkable. There is a worsening  tear of the posterior horn of the medial meniscus with  bucket-handle type tear now with some of the meniscus flipped  into the intercondylar notch below the PCL with a double PCL sign  seen. Lateral meniscus appears intact without evidence of tear.  There is chondromalacia with underlying reactive marrow change in  the medial femoral condyle and medial tibial plateau. Bone marrow  signal is otherwise unremarkable. No other cartilage abnormality  is noted.     IMPRESSION:  1. Changes of osteoarthritis worse in the medial compartment with  also chondromalacia and underlying reactive marrow change in the  medial compartment.  2. Bucket-handle type tear of the posterior horn of the medial  meniscus.  3. Small joint effusion and small Baker's cyst.     Electronically signed by:  Luke Yoder  8/16/2018 7:08 AM  CDT Workstation: [id number]      ASSESSMENT:    Diagnoses and all orders for this visit:    Bucket handle tear of medial meniscus of left knee, subsequent encounter    Left knee pain, unspecified chronicity    Internal derangement of knee joint, left    Essential hypertension    GERD without  esophagitis          PLAN    Patient's Body mass index is 32.58 kg/m². BMI is above normal parameters. Recommendations include: exercise counseling and nutrition counseling     Plan arthroscopy of the left knee with debridement of the medial meniscus.    The patient voiced understanding of the risks, benefits, and alternative forms of treatment that were discussed and the patient consents to proceed with surgery.  All risks, benefits and alternatives were discussed.  Risks including to but not exclusive to anesthetic complications, including death, MI, CVA, infection, bleeding DVT, fracture, residual pain and need for future surgery.  This discussion was held with the patient by Nathen Zapien MD and all questions were answered..    Return for Post-operative eval.    Nathen Zapien MD

## 2018-10-12 ENCOUNTER — ANESTHESIA EVENT (OUTPATIENT)
Dept: PERIOP | Facility: HOSPITAL | Age: 68
End: 2018-10-12

## 2018-10-15 ENCOUNTER — ANESTHESIA (OUTPATIENT)
Dept: PERIOP | Facility: HOSPITAL | Age: 68
End: 2018-10-15

## 2018-10-15 ENCOUNTER — HOSPITAL ENCOUNTER (OUTPATIENT)
Facility: HOSPITAL | Age: 68
Setting detail: HOSPITAL OUTPATIENT SURGERY
Discharge: HOME | End: 2018-10-15
Attending: ORTHOPAEDIC SURGERY | Admitting: ANESTHESIOLOGY

## 2018-10-15 VITALS
HEART RATE: 57 BPM | SYSTOLIC BLOOD PRESSURE: 123 MMHG | WEIGHT: 208.11 LBS | OXYGEN SATURATION: 99 % | TEMPERATURE: 96.9 F | RESPIRATION RATE: 18 BRPM | HEIGHT: 67 IN | DIASTOLIC BLOOD PRESSURE: 67 MMHG | BODY MASS INDEX: 32.66 KG/M2

## 2018-10-15 DIAGNOSIS — K21.9 GERD WITHOUT ESOPHAGITIS: ICD-10-CM

## 2018-10-15 DIAGNOSIS — M25.562 LEFT KNEE PAIN, UNSPECIFIED CHRONICITY: ICD-10-CM

## 2018-10-15 DIAGNOSIS — M23.92 INTERNAL DERANGEMENT OF KNEE JOINT, LEFT: Primary | ICD-10-CM

## 2018-10-15 DIAGNOSIS — F41.1 ANXIETY STATE: ICD-10-CM

## 2018-10-15 DIAGNOSIS — I10 ESSENTIAL HYPERTENSION: ICD-10-CM

## 2018-10-15 DIAGNOSIS — G89.29 CHRONIC PAIN OF LEFT KNEE: ICD-10-CM

## 2018-10-15 DIAGNOSIS — M25.562 CHRONIC PAIN OF LEFT KNEE: ICD-10-CM

## 2018-10-15 PROCEDURE — 25010000002 FENTANYL CITRATE (PF) 100 MCG/2ML SOLUTION: Performed by: NURSE ANESTHETIST, CERTIFIED REGISTERED

## 2018-10-15 PROCEDURE — 25010000002 HYDROMORPHONE 1 MG/ML SOLUTION: Performed by: NURSE ANESTHETIST, CERTIFIED REGISTERED

## 2018-10-15 PROCEDURE — 25010000002 PROPOFOL 10 MG/ML EMULSION: Performed by: NURSE ANESTHETIST, CERTIFIED REGISTERED

## 2018-10-15 PROCEDURE — 29881 ARTHRS KNE SRG MNISECTMY M/L: CPT | Performed by: ORTHOPAEDIC SURGERY

## 2018-10-15 PROCEDURE — 25010000002 MORPHINE PER 10 MG: Performed by: ORTHOPAEDIC SURGERY

## 2018-10-15 PROCEDURE — 25010000002 ONDANSETRON PER 1 MG: Performed by: NURSE ANESTHETIST, CERTIFIED REGISTERED

## 2018-10-15 PROCEDURE — 25010000002 MIDAZOLAM PER 1 MG: Performed by: NURSE ANESTHETIST, CERTIFIED REGISTERED

## 2018-10-15 PROCEDURE — 25010000002 DEXAMETHASONE PER 1 MG: Performed by: NURSE ANESTHETIST, CERTIFIED REGISTERED

## 2018-10-15 RX ORDER — EPHEDRINE SULFATE 50 MG/ML
5 INJECTION, SOLUTION INTRAVENOUS ONCE AS NEEDED
Status: DISCONTINUED | OUTPATIENT
Start: 2018-10-15 | End: 2018-10-15 | Stop reason: HOSPADM

## 2018-10-15 RX ORDER — SCOLOPAMINE TRANSDERMAL SYSTEM 1 MG/1
1 PATCH, EXTENDED RELEASE TRANSDERMAL ONCE
Status: DISCONTINUED | OUTPATIENT
Start: 2018-10-15 | End: 2018-10-15 | Stop reason: HOSPADM

## 2018-10-15 RX ORDER — ACETAMINOPHEN 325 MG/1
650 TABLET ORAL ONCE AS NEEDED
Status: DISCONTINUED | OUTPATIENT
Start: 2018-10-15 | End: 2018-10-15 | Stop reason: HOSPADM

## 2018-10-15 RX ORDER — MIDAZOLAM HYDROCHLORIDE 1 MG/ML
INJECTION INTRAMUSCULAR; INTRAVENOUS AS NEEDED
Status: DISCONTINUED | OUTPATIENT
Start: 2018-10-15 | End: 2018-10-15 | Stop reason: SURG

## 2018-10-15 RX ORDER — DEXAMETHASONE SODIUM PHOSPHATE 4 MG/ML
INJECTION, SOLUTION INTRA-ARTICULAR; INTRALESIONAL; INTRAMUSCULAR; INTRAVENOUS; SOFT TISSUE AS NEEDED
Status: DISCONTINUED | OUTPATIENT
Start: 2018-10-15 | End: 2018-10-15 | Stop reason: SURG

## 2018-10-15 RX ORDER — HYDROCODONE BITARTRATE AND ACETAMINOPHEN 7.5; 325 MG/1; MG/1
1 TABLET ORAL EVERY 4 HOURS PRN
Qty: 30 TABLET | Refills: 0 | Status: SHIPPED | OUTPATIENT
Start: 2018-10-15 | End: 2018-10-26

## 2018-10-15 RX ORDER — ONDANSETRON 2 MG/ML
INJECTION INTRAMUSCULAR; INTRAVENOUS AS NEEDED
Status: DISCONTINUED | OUTPATIENT
Start: 2018-10-15 | End: 2018-10-15 | Stop reason: SURG

## 2018-10-15 RX ORDER — MEPERIDINE HYDROCHLORIDE 50 MG/ML
12.5 INJECTION INTRAMUSCULAR; INTRAVENOUS; SUBCUTANEOUS
Status: DISCONTINUED | OUTPATIENT
Start: 2018-10-15 | End: 2018-10-15 | Stop reason: HOSPADM

## 2018-10-15 RX ORDER — FLUMAZENIL 0.1 MG/ML
0.2 INJECTION INTRAVENOUS AS NEEDED
Status: DISCONTINUED | OUTPATIENT
Start: 2018-10-15 | End: 2018-10-15 | Stop reason: HOSPADM

## 2018-10-15 RX ORDER — LABETALOL HYDROCHLORIDE 5 MG/ML
5 INJECTION, SOLUTION INTRAVENOUS
Status: DISCONTINUED | OUTPATIENT
Start: 2018-10-15 | End: 2018-10-15 | Stop reason: HOSPADM

## 2018-10-15 RX ORDER — PROMETHAZINE HYDROCHLORIDE 12.5 MG/1
12.5 TABLET ORAL ONCE AS NEEDED
Status: DISCONTINUED | OUTPATIENT
Start: 2018-10-15 | End: 2018-10-15 | Stop reason: HOSPADM

## 2018-10-15 RX ORDER — HYDROCODONE BITARTRATE AND ACETAMINOPHEN 7.5; 325 MG/1; MG/1
1 TABLET ORAL ONCE AS NEEDED
Status: DISCONTINUED | OUTPATIENT
Start: 2018-10-15 | End: 2018-10-15 | Stop reason: HOSPADM

## 2018-10-15 RX ORDER — LIDOCAINE HYDROCHLORIDE 20 MG/ML
INJECTION, SOLUTION INFILTRATION; PERINEURAL AS NEEDED
Status: DISCONTINUED | OUTPATIENT
Start: 2018-10-15 | End: 2018-10-15 | Stop reason: SURG

## 2018-10-15 RX ORDER — NALOXONE HCL 0.4 MG/ML
0.2 VIAL (ML) INJECTION AS NEEDED
Status: DISCONTINUED | OUTPATIENT
Start: 2018-10-15 | End: 2018-10-15 | Stop reason: HOSPADM

## 2018-10-15 RX ORDER — MORPHINE SULFATE 10 MG/ML
INJECTION, SOLUTION INTRAMUSCULAR; INTRAVENOUS AS NEEDED
Status: DISCONTINUED | OUTPATIENT
Start: 2018-10-15 | End: 2018-10-15 | Stop reason: HOSPADM

## 2018-10-15 RX ORDER — SODIUM CHLORIDE, SODIUM GLUCONATE, SODIUM ACETATE, POTASSIUM CHLORIDE, AND MAGNESIUM CHLORIDE 526; 502; 368; 37; 30 MG/100ML; MG/100ML; MG/100ML; MG/100ML; MG/100ML
1000 INJECTION, SOLUTION INTRAVENOUS CONTINUOUS
Status: DISCONTINUED | OUTPATIENT
Start: 2018-10-15 | End: 2018-10-15 | Stop reason: HOSPADM

## 2018-10-15 RX ORDER — PROPOFOL 10 MG/ML
VIAL (ML) INTRAVENOUS AS NEEDED
Status: DISCONTINUED | OUTPATIENT
Start: 2018-10-15 | End: 2018-10-15 | Stop reason: SURG

## 2018-10-15 RX ORDER — CLINDAMYCIN PHOSPHATE 900 MG/50ML
900 INJECTION INTRAVENOUS ONCE
Status: COMPLETED | OUTPATIENT
Start: 2018-10-15 | End: 2018-10-15

## 2018-10-15 RX ORDER — BUPIVACAINE HYDROCHLORIDE AND EPINEPHRINE 2.5; 5 MG/ML; UG/ML
INJECTION, SOLUTION EPIDURAL; INFILTRATION; INTRACAUDAL; PERINEURAL AS NEEDED
Status: DISCONTINUED | OUTPATIENT
Start: 2018-10-15 | End: 2018-10-15 | Stop reason: HOSPADM

## 2018-10-15 RX ORDER — SCOLOPAMINE TRANSDERMAL SYSTEM 1 MG/1
1 PATCH, EXTENDED RELEASE TRANSDERMAL ONCE
Status: DISCONTINUED | OUTPATIENT
Start: 2018-10-15 | End: 2018-10-15 | Stop reason: SDUPTHER

## 2018-10-15 RX ORDER — ONDANSETRON 4 MG/1
4 TABLET, FILM COATED ORAL ONCE AS NEEDED
Status: DISCONTINUED | OUTPATIENT
Start: 2018-10-15 | End: 2018-10-15 | Stop reason: HOSPADM

## 2018-10-15 RX ORDER — DIPHENHYDRAMINE HYDROCHLORIDE 50 MG/ML
12.5 INJECTION INTRAMUSCULAR; INTRAVENOUS
Status: DISCONTINUED | OUTPATIENT
Start: 2018-10-15 | End: 2018-10-15 | Stop reason: HOSPADM

## 2018-10-15 RX ORDER — ACETAMINOPHEN 650 MG/1
650 SUPPOSITORY RECTAL ONCE AS NEEDED
Status: DISCONTINUED | OUTPATIENT
Start: 2018-10-15 | End: 2018-10-15 | Stop reason: HOSPADM

## 2018-10-15 RX ORDER — ONDANSETRON 2 MG/ML
4 INJECTION INTRAMUSCULAR; INTRAVENOUS ONCE AS NEEDED
Status: DISCONTINUED | OUTPATIENT
Start: 2018-10-15 | End: 2018-10-15 | Stop reason: HOSPADM

## 2018-10-15 RX ORDER — FENTANYL CITRATE 50 UG/ML
INJECTION, SOLUTION INTRAMUSCULAR; INTRAVENOUS AS NEEDED
Status: DISCONTINUED | OUTPATIENT
Start: 2018-10-15 | End: 2018-10-15 | Stop reason: SURG

## 2018-10-15 RX ADMIN — SCOPALAMINE 1 PATCH: 1 PATCH, EXTENDED RELEASE TRANSDERMAL at 09:00

## 2018-10-15 RX ADMIN — FENTANYL CITRATE 50 MCG: 50 INJECTION, SOLUTION INTRAMUSCULAR; INTRAVENOUS at 10:34

## 2018-10-15 RX ADMIN — HYDROMORPHONE HYDROCHLORIDE 0.5 MG: 1 INJECTION, SOLUTION INTRAMUSCULAR; INTRAVENOUS; SUBCUTANEOUS at 11:47

## 2018-10-15 RX ADMIN — LIDOCAINE HYDROCHLORIDE 50 MG: 20 INJECTION, SOLUTION INFILTRATION; PERINEURAL at 10:27

## 2018-10-15 RX ADMIN — HYDROMORPHONE HYDROCHLORIDE 0.5 MG: 1 INJECTION, SOLUTION INTRAMUSCULAR; INTRAVENOUS; SUBCUTANEOUS at 11:52

## 2018-10-15 RX ADMIN — MEPERIDINE HYDROCHLORIDE 12.5 MG: 50 INJECTION INTRAMUSCULAR; INTRAVENOUS; SUBCUTANEOUS at 12:09

## 2018-10-15 RX ADMIN — ONDANSETRON 4 MG: 2 INJECTION INTRAMUSCULAR; INTRAVENOUS at 11:08

## 2018-10-15 RX ADMIN — PROPOFOL 150 MG: 10 INJECTION, EMULSION INTRAVENOUS at 10:27

## 2018-10-15 RX ADMIN — CLINDAMYCIN IN 5 PERCENT DEXTROSE 900 MG: 18 INJECTION, SOLUTION INTRAVENOUS at 10:33

## 2018-10-15 RX ADMIN — PROPOFOL 50 MG: 10 INJECTION, EMULSION INTRAVENOUS at 10:29

## 2018-10-15 RX ADMIN — SODIUM CHLORIDE, SODIUM GLUCONATE, SODIUM ACETATE, POTASSIUM CHLORIDE, AND MAGNESIUM CHLORIDE 1000 ML: 526; 502; 368; 37; 30 INJECTION, SOLUTION INTRAVENOUS at 09:00

## 2018-10-15 RX ADMIN — FENTANYL CITRATE 50 MCG: 50 INJECTION, SOLUTION INTRAMUSCULAR; INTRAVENOUS at 10:44

## 2018-10-15 RX ADMIN — MIDAZOLAM HYDROCHLORIDE 2 MG: 2 INJECTION, SOLUTION INTRAMUSCULAR; INTRAVENOUS at 10:22

## 2018-10-15 RX ADMIN — DEXAMETHASONE SODIUM PHOSPHATE 10 MG: 4 INJECTION, SOLUTION INTRAMUSCULAR; INTRAVENOUS at 10:40

## 2018-10-15 NOTE — ANESTHESIA PROCEDURE NOTES
Airway  Urgency: elective    Airway not difficult    General Information and Staff    Patient location during procedure: OR  CRNA: DAVIS AGUILAR    Indications and Patient Condition  Indications for airway management: airway protection    Preoxygenated: yes  MILS maintained throughout      Final Airway Details  Final airway type: supraglottic airway      Successful airway: I-gel  Size 4    Number of attempts at approach: 1

## 2018-10-15 NOTE — ANESTHESIA PREPROCEDURE EVALUATION
Anesthesia Evaluation     Patient summary reviewed and Nursing notes reviewed   history of anesthetic complications (Scopalamine patch applied pre-op.): PONV  NPO Solid Status: > 8 hours  NPO Liquid Status: > 8 hours           Airway   Mallampati: II  TM distance: >3 FB  Neck ROM: full  possible difficult intubation  Dental    (+) poor dentation    Comment: Left upper incisor with crown. Lower dentition with grounded surfaces.    Pulmonary - normal exam    breath sounds clear to auscultation  (+) pneumonia resolved , asthma,   (-) not a smoker    ROS comment:    HISTORY: Cough. Fever.     Frontal and lateral films of the chest were obtained.  COMPARISON: November 26, 2013     The lungs are clear of an acute process.  The heart is not enlarged.  The pulmonary vasculature is not increased.  No pleural effusion.  No pneumothorax.  No acute osseous abnormality.  Degenerative changes are present in the thoracic spine.  Surgical clips right upper quadrant of the abdomen.     IMPRESSION:  CONCLUSION:  No Acute Disease     [address]     Electronically signed by:  Maciel Flores MD  3/27/2018 9:59 AM CDT  Cardiovascular - normal exam    ECG reviewed  Rhythm: regular  Rate: normal    (+) hypertension, murmur (Grade II/VI systolic),     ROS comment: Normal sinus rhythm  Normal ECG  When compared with ECG of 15-DEC-2015 08:48,  No significant change was found  Confirmed by LUIS F RAYO MD ([id number]) on 10/12/2018 12:40:24    Neuro/Psych  (+) psychiatric history Anxiety,     GI/Hepatic/Renal/Endo    (+) obesity,  GERD well controlled,      Musculoskeletal     Abdominal   (+) obese,    Substance History - negative use     OB/GYN negative ob/gyn ROS         Other   (+) arthritis                     Anesthesia Plan    ASA 3     general     intravenous induction   Anesthetic plan, all risks, benefits, and alternatives have been provided, discussed and informed consent has been obtained with: patient and spouse/significant other.

## 2018-10-15 NOTE — OP NOTE
NAME: Cari Brooks     YOB: 1950    DATE OF SURGERY: 10/15/2018    PREOPERATIVE DIAGNOSIS:  Essential hypertension [I10]  GERD without esophagitis [K21.9]  Internal derangement of knee joint, left [M23.92]  Left knee pain, unspecified chronicity [M25.562]    POSTOPERATIVE DIAGNOSIS:  Post-Op Diagnosis Codes:     * Essential hypertension [I10]     * GERD without esophagitis [K21.9]     * Internal derangement of knee joint, left [M23.92]     * Left knee pain, unspecified chronicity [M25.562]    Procedure:  Procedure(s) (LRB):  KNEE ARTHROSCOPY with debridement medial meniscus   (Left)      SURGEON:  Nathen Zapien MD    Assistant:  CLEMENTE Bardales    Staff:    Circulator: Amelia Chavira RN  Scrub Person: Maricel Roach Niki  Assistant: Thelma Cha CSA    Anesthesia:  General    Estimated Blood Loss: minimal    Specimens : None    Complications: none    DESCRIPTION OF PROCEDURE: The patient was taken to the operating room and placed in the supine position. After adequate induction of general anesthesia the leg was prepped and draped in the usual sterile fashion exsanguinated with an Ace bandage and the tourniquet inflated to 250 mmHg.  The leg was placed in a well-padded leg grayson. An anterior lateral portal site was then created and the scope was introduced.  The knee was examined in sequential fashion.       The scope was then introduced into the medial joint.  Medial joint findings included mild articular cartilage changes in the medial femoral condyle and cartilage changes in the tibial plateau with a couple of small areas of bare bone.    At this point the anteromedial portal was created with spinal needle for localization.  The meniscus was then probed and found to be irregular in the posterior horn with an overlapped flap.  This area was debrided back to the base of the meniscus.  A transition zone was then made into the mid body of the meniscus to smooth  the remaining portions of the meniscus.  The articular cartilage also gently debrided with the suction shaver to ensure there were no loose pieces of articular cartilage pending.  Shaver was run on suction to remove any remaining loose fragments in the medial compartment.    The ACL was then examined and noted to be normal.    The lateral joint was examined and noted to have intact articular cartilage in the tibial plateau as well as in the femoral condyle.  The meniscus was noted to be intact as well.  The patellofemoral joint was examined and found to have mild to moderate arthritic changes on the undersurface of the patella as well as in the patellofemoral groove.  No bare bone was identified.  No other acute findings or loose bodies were noted either .    The knee was then expressed of excess fluid, the portal sites were closed.  Sterile dressings were then applied the patient was awakened and transferred to the recovery room.  At the end of the case the sponge and needle counts were reported as being correct and there were no known complications.      Findings: As above          Nathen Zapien MD     Date: 10/15/2018  Time: 3:23 PM

## 2018-10-15 NOTE — H&P (VIEW-ONLY)
Cari Brooks is a 67 y.o. female returns for     Chief Complaint   Patient presents with   • Left Knee - Pre-op Exam       HISTORY OF PRESENT ILLNESS:  Patient here for pre op evaluation for left knee arthroscopy. Scheduled for 10/15/18.  No fevers or chills.  No new injury.  She has pain with pivoting and twisting and pain with prolonged standing and walking.  No numbness or tingling.       CONCURRENT MEDICAL HISTORY:    Past Medical History:   Diagnosis Date   • Acute maxillary sinusitis    • Acute sinusitis    • Allergic rhinitis    • Anxiety state    • Asthma    • Bronchopneumonia    • Contact dermatitis due to plant    • Cough    • Edema of lower extremity    • Encounter for general adult medical examination without abnormal findings    • Encounter for immunization    • Encounter for routine adult health examination    • Equinus contracture of ankle    • Essential hypertension    • Fever    • GERD (gastroesophageal reflux disease)    • Heel pain    • Knee pain    • Osteoarthritis    • Otalgia    • Pain in limb    • Plantar fasciitis    • PONV (postoperative nausea and vomiting)    • Routine general medical examination at health care facility    • Screening for malignant neoplasm of colon    • Screening mammogram, encounter for    • Synovial cyst of knee    • Vitamin D deficiency        Allergies   Allergen Reactions   • Zestril [Lisinopril] Shortness Of Breath   • Penicillins Rash       Current Outpatient Prescriptions on File Prior to Visit   Medication Sig   • amLODIPine (NORVASC) 10 MG tablet Take 1 tablet by mouth Daily. for blood pressure   • budesonide (RINOCORT AQUA) 32 MCG/ACT nasal spray 2 sprays into the nostril(s) as directed by provider Daily.   • Cholecalciferol (D3-1000) 1000 UNITS capsule Take 1,000 Units by mouth Daily. cholecalciferol (vitamin D3) 1,000 unit capsule   • losartan-hydrochlorothiazide (HYZAAR) 100-25 MG per tablet Take 1 tablet by mouth Daily. for blood pressure   •  potassium chloride (MICRO-K) 8 MEQ CR capsule Take 1 capsule by mouth 2 (Two) Times a Day.   • promethazine-dextromethorphan (PROMETHAZINE-DM) 6.25-15 MG/5ML syrup Take 5 mL by mouth Every 6 (Six) Hours As Needed for Cough.   • pseudoephedrine (SUDAFED) 30 MG tablet Take 1 tablet by mouth 2 (Two) Times a Day As Needed for Congestion.   • omeprazole (priLOSEC) 20 MG capsule Take 20 mg by mouth Daily As Needed.     No current facility-administered medications on file prior to visit.        Past Surgical History:   Procedure Laterality Date   • BACK SURGERY  06/2018   • ENDOSCOPY  11/13/2012    Colon endoscopy 61559 (2)    Mild diverticulosis in sigmoid colon. 1 polyp in colon 35 cm from entry & 1 polyp in colon 15 cm from entry; both removed by cold biopsy polypectomy. Hemorrhoids found.    • HYSTERECTOMY  1986    Hysterectomy w/ oophorectomy (1)     • INJECTION OF MEDICATION  09/03/2015    Kenalog (3)      • KNEE ARTHROSCOPY     • LAPAROSCOPIC CHOLECYSTECTOMY  10/04/2001   • OOPHORECTOMY     • PAP SMEAR  02/12/2009   • REPAIR ANKLE LIGAMENT  10/12/2004   • TONSILLECTOMY  06/20/1956       Family History   Problem Relation Age of Onset   • Cancer Mother         breast   • Breast cancer Mother    • Arthritis Mother    • Hypertension Mother    • ALS Father    • Cancer Father    • Hypertension Father    • Diabetes Other    • Hypertension Other    • Cancer Other         stomach       Social History     Social History   • Marital status:      Spouse name: N/A   • Number of children: N/A   • Years of education: N/A     Occupational History   • Not on file.     Social History Main Topics   • Smoking status: Never Smoker   • Smokeless tobacco: Never Used   • Alcohol use No   • Drug use: No   • Sexual activity: Defer     Other Topics Concern   • Not on file     Social History Narrative   • No narrative on file         ROS  No fevers or chills.  No chest pain or shortness of air.  No GI or  disturbances.    PHYSICAL  "EXAMINATION:       Ht 170.2 cm (67\")   Wt 94.3 kg (208 lb)   LMP  (LMP Unknown)   BMI 32.58 kg/m²     Physical Exam   Constitutional: She is oriented to person, place, and time. She appears well-developed and well-nourished. No distress.   Cardiovascular: Normal rate, regular rhythm and normal heart sounds.    Pulmonary/Chest: Effort normal and breath sounds normal.   Abdominal: Soft. Bowel sounds are normal.   Musculoskeletal:        Right knee: She exhibits no effusion.        Left knee: She exhibits no effusion.   Neurological: She is alert and oriented to person, place, and time.   Psychiatric: She has a normal mood and affect. Her behavior is normal. Judgment and thought content normal.   Vitals reviewed.      GAIT:     [x]  Normal  []  Antalgic    Assistive device: [x]  None  []  Walker     []  Crutches  []  Cane     []  Wheelchair  []  Stretcher    Right Knee Exam     Tenderness   The patient is experiencing no tenderness.         Range of Motion   The patient has normal right knee ROM.    Muscle Strength     The patient has normal right knee strength.    Tests   Drawer:       Anterior - negative      Varus: negative  Valgus: negative    Other   Erythema: absent  Sensation: normal  Pulse: present  Swelling: none  Other tests: no effusion present      Left Knee Exam     Tenderness   The patient is experiencing tenderness in the medial joint line and medial retinaculum.    Range of Motion   The patient has normal left knee ROM.  Extension: -5   Flexion: 110     Muscle Strength     The patient has normal left knee strength.    Tests   Drawer:       Anterior - negative     Posterior - negative  Varus: positive        Other   Erythema: absent  Sensation: normal  Pulse: present  Swelling: none  Effusion: no effusion present                     MRI left knee without contrast on 8/15/2018     CLINICAL INDICATION: Medial left knee pain     TECHNIQUE: Multiplanar, multisequence MR images are obtained  throughout the " left knee without the administration of contrast.   This examination was performed on a 1.5 Cherrie magnet.     COMPARISON: Left knee x-ray from 8/9/2018 and prior MRI from  11/3/2015     FINDINGS: There is a small joint effusion. There is a small  Baker's cyst. Presacral edema is noted in the prepatellar region.  The patellar and quadriceps tendons are intact and unremarkable.  Popliteus tendon is intact and unremarkable. There is joint space  narrowing in the medial compartment with osteophyte formation  along the femoral condyles and medial tibial plateau consistent  with changes of osteoarthritis. The medial meniscus is extruded  medially which bows out the MCL but the MCL is intact. The  lateral collateral ligament complex is intact and unremarkable.  The ACL and PCL are intact and unremarkable. There is a worsening  tear of the posterior horn of the medial meniscus with  bucket-handle type tear now with some of the meniscus flipped  into the intercondylar notch below the PCL with a double PCL sign  seen. Lateral meniscus appears intact without evidence of tear.  There is chondromalacia with underlying reactive marrow change in  the medial femoral condyle and medial tibial plateau. Bone marrow  signal is otherwise unremarkable. No other cartilage abnormality  is noted.     IMPRESSION:  1. Changes of osteoarthritis worse in the medial compartment with  also chondromalacia and underlying reactive marrow change in the  medial compartment.  2. Bucket-handle type tear of the posterior horn of the medial  meniscus.  3. Small joint effusion and small Baker's cyst.     Electronically signed by:  Luke Yoder  8/16/2018 7:08 AM  CDT Workstation: [id number]      ASSESSMENT:    Diagnoses and all orders for this visit:    Bucket handle tear of medial meniscus of left knee, subsequent encounter    Left knee pain, unspecified chronicity    Internal derangement of knee joint, left    Essential hypertension    GERD without  esophagitis          PLAN    Patient's Body mass index is 32.58 kg/m². BMI is above normal parameters. Recommendations include: exercise counseling and nutrition counseling     Plan arthroscopy of the left knee with debridement of the medial meniscus.    The patient voiced understanding of the risks, benefits, and alternative forms of treatment that were discussed and the patient consents to proceed with surgery.  All risks, benefits and alternatives were discussed.  Risks including to but not exclusive to anesthetic complications, including death, MI, CVA, infection, bleeding DVT, fracture, residual pain and need for future surgery.  This discussion was held with the patient by Nathen Zapien MD and all questions were answered..    Return for Post-operative eval.    Nahten Zapien MD

## 2018-10-15 NOTE — ANESTHESIA POSTPROCEDURE EVALUATION
Patient: Cari Brooks    Procedure Summary     Date:  10/15/18 Room / Location:  [facility] OR  / [facility] OR    Anesthesia Start:  1023 Anesthesia Stop:  1130    Procedure:  KNEE ARTHROSCOPY with debridement medial meniscus   (Left Knee) Diagnosis:       Essential hypertension      GERD without esophagitis      Internal derangement of knee joint, left      Left knee pain, unspecified chronicity      (Essential hypertension [I10])      (GERD without esophagitis [K21.9])      (Internal derangement of knee joint, left [M23.92])      (Left knee pain, unspecified chronicity [M25.562])    Surgeon:  Nathen Zapien MD Provider:  Homar Guzmán CRNA    Anesthesia Type:  general ASA Status:  3          Anesthesia Type: general  Last vitals  BP   154/79 (10/15/18 0850)   Temp   98.7 °F (37.1 °C) (10/15/18 0850)   Pulse   84 (10/15/18 0850)   Resp   18 (10/15/18 0850)     SpO2   98 % (10/15/18 0850)     Post Anesthesia Care and Evaluation    Patient location during evaluation: PACU  Patient participation: waiting for patient participation  Level of consciousness: responsive to noxious stimuli  Pain score: 0  Pain management: adequate  Airway patency: patent  Anesthetic complications: No anesthetic complications  PONV Status: none  Cardiovascular status: acceptable  Respiratory status: acceptable (LMA remains in place)  Hydration status: acceptable

## 2018-10-17 ENCOUNTER — HOSPITAL ENCOUNTER (OUTPATIENT)
Dept: PHYSICAL THERAPY | Facility: HOSPITAL | Age: 68
Setting detail: THERAPIES SERIES
Discharge: HOME | End: 2018-10-17

## 2018-10-17 DIAGNOSIS — M23.92 INTERNAL DERANGEMENT OF KNEE JOINT, LEFT: Primary | ICD-10-CM

## 2018-10-17 DIAGNOSIS — M25.562 ACUTE PAIN OF LEFT KNEE: ICD-10-CM

## 2018-10-17 PROCEDURE — G8979 MOBILITY GOAL STATUS: HCPCS | Performed by: PHYSICAL THERAPIST

## 2018-10-17 PROCEDURE — 97110 THERAPEUTIC EXERCISES: CPT | Performed by: PHYSICAL THERAPIST

## 2018-10-17 PROCEDURE — 97161 PT EVAL LOW COMPLEX 20 MIN: CPT | Performed by: PHYSICAL THERAPIST

## 2018-10-17 PROCEDURE — G8978 MOBILITY CURRENT STATUS: HCPCS | Performed by: PHYSICAL THERAPIST

## 2018-10-17 NOTE — THERAPY EVALUATION
Outpatient Physical Therapy Ortho Initial Evaluation  [facility]     Patient Name: Cari Brooks  : 1950  MRN: [id number]  Today's Date: 10/17/2018      Visit Date: 10/17/2018  Medicare:    MD follow up 10/26    Patient Active Problem List   Diagnosis   • Anxiety state   • Asthma   • Essential hypertension   • Vitamin D deficiency   • Osteoarthritis   • GERD without esophagitis   • Internal derangement of knee joint, left   • Left knee pain        Past Medical History:   Diagnosis Date   • Acute maxillary sinusitis    • Acute sinusitis    • Allergic rhinitis    • Anxiety state    • Asthma    • Bronchopneumonia    • Contact dermatitis due to plant    • Cough    • Edema of lower extremity    • Encounter for general adult medical examination without abnormal findings    • Encounter for immunization    • Encounter for routine adult health examination    • Equinus contracture of ankle    • Essential hypertension    • Fever    • GERD (gastroesophageal reflux disease)    • Heel pain    • Knee pain    • Osteoarthritis    • Otalgia    • Pain in limb    • Plantar fasciitis    • PONV (postoperative nausea and vomiting)    • Routine general medical examination at health care facility    • Screening for malignant neoplasm of colon    • Screening mammogram, encounter for    • Synovial cyst of knee    • Vitamin D deficiency         Past Surgical History:   Procedure Laterality Date   • BACK SURGERY  2018   • ENDOSCOPY  2012    Colon endoscopy 97936 (2)    Mild diverticulosis in sigmoid colon. 1 polyp in colon 35 cm from entry & 1 polyp in colon 15 cm from entry; both removed by cold biopsy polypectomy. Hemorrhoids found.    • HYSTERECTOMY  1986    Hysterectomy w/ oophorectomy (1)     • INJECTION OF MEDICATION  2015    Kenalog (3)      • KNEE ARTHROSCOPY     • KNEE ARTHROSCOPY Left 10/15/2018    Procedure: KNEE ARTHROSCOPY with debridement medial meniscus  ;  Surgeon: Nathen Zapien,  MD;  Location: [facility] OR;  Service: Orthopedics   • LAPAROSCOPIC CHOLECYSTECTOMY  10/04/2001   • OOPHORECTOMY     • PAP SMEAR  02/12/2009   • REPAIR ANKLE LIGAMENT  10/12/2004   • TONSILLECTOMY  06/20/1956   Medications (Admitted on 10/17/2018)    amLODIPine (NORVASC) 10 MG tablet    budesonide (RINOCORT AQUA) 32 MCG/ACT nasal spray    Cholecalciferol (D3-1000) 1000 UNITS capsule    HYDROcodone-acetaminophen (NORCO) 7.5-325 MG per tablet    losartan-hydrochlorothiazide (HYZAAR) 100-25 MG per tablet    omeprazole (priLOSEC) 20 MG capsule    potassium chloride (MICRO-K) 8 MEQ CR capsule    promethazine-dextromethorphan (PROMETHAZINE-DM) 6.25-15 MG/5ML syrup    pseudoephedrine (SUDAFED) 30 MG tablet   ALLERGIES: Zestril, penicillins    Visit Dx:     ICD-10-CM ICD-9-CM   1. Internal derangement of knee joint, left M23.92 717.9   2. Acute pain of left knee M25.562 719.46             Patient History     Row Name 10/17/18 0800             History    Chief Complaint Pain  -DD      Type of Pain Knee pain  -DD      Date Current Problem(s) Began --   on/off about a year  -DD      Brief Description of Current Complaint Same knee scoped 3 yrs ago, no injury.  -DD      Previous treatment for THIS PROBLEM Surgery  -DD      Surgery Date: 10/15/18  -DD      Occupation/sports/leisure activities Retired, PRN  at the [employer]  -DD         Pain     Pain Location Knee  -DD      Pain at Present 2  -DD      Pain Description Tightness  -DD        User Key  (r) = Recorded By, (t) = Taken By, (c) = Cosigned By    Initials Name Provider Type    DD Jennifer Briscoe, PT DPT Physical Therapist                PT Ortho     Row Name 10/17/18 0800       Subjective Comments    Subjective Comments already better than before surgery  -DD       Subjective Pain    Able to rate subjective pain? yes  -DD    Pre-Treatment Pain Level 2  -DD       Posture/Observations    Posture/Observations Comments slight antalgic gait. no AD, sutures intactact no  redness or drainage  -DD       General ROM    GENERAL ROM COMMENTS 3-113 Left 0-130 R  -DD       MMT (Manual Muscle Testing)    General MMT Comments good quad set, SLR    -DD       Sensation    Sensation WNL? WNL  -DD    Light Touch No apparent deficits  -DD       Girth    Girth Measured? --   MJL 43.3 L; R 42.6  -DD      User Key  (r) = Recorded By, (t) = Taken By, (c) = Cosigned By    Initials Name Provider Type    Jennifer Garsia, PT DPT Physical Therapist                      Therapy Education  Given: HEP  Program: New  How Provided: Verbal, Written  Provided to: Patient  Level of Understanding: Verbalized, Demonstrated           PT OP Goals     Row Name 10/17/18 0800          PT Short Term Goals    STG Date to Achieve 11/07/18  -DD     STG 1 Pt will be independent in the HEP  -DD     STG 2 Pt will be able to perform 20 SLR without Ext lag  -DD     STG 3 Pt will ambulate with normal gait pattern  -DD     STG 4 Pt will have left knee ROM  0-130.  -DD     STG 5 Pt will have LEFS 60/80  -DD        Long Term Goals    LTG Date to Achieve 11/07/18  -DD     LTG 1 PT will have 5/5 MMT quads and hamstrings Left.  -DD        Time Calculation    PT Goal Re-Cert Due Date 11/07/18  -DD       User Key  (r) = Recorded By, (t) = Taken By, (c) = Cosigned By    Initials Name Provider Type    Jennifer Garsia, PT DPT Physical Therapist                PT Assessment/Plan     Row Name 10/17/18 0815          PT Assessment    Functional Limitations Impaired gait;Limitation in home management;Performance in leisure activities  -DD     Impairments Endurance;Gait;Muscle strength;Pain;Range of motion  -DD     Assessment Comments Pt has limits with ROM and strength following surgery.  Will do well with some therapy intervention for return to prior level of function.  -DD     Please refer to paper survey for additional self-reported information Yes  -DD     Rehab Potential Excellent  -DD     Patient/caregiver participated  "in establishment of treatment plan and goals Yes  -DD     Patient would benefit from skilled therapy intervention Yes  -DD        PT Plan    PT Frequency 2x/week  -DD     Predicted Duration of Therapy Intervention (Therapy Eval) 3 weeks  -DD     Planned CPT's? PT EVAL LOW COMPLEXITY: 35008;PT THER PROC EA 15 MIN: 07366;PT GAIT TRAINING EA 15 MIN: 98679;PT ELECTRICAL STIM UNATTEND: ;PT HOT OR COLD PACK TREAT [facility]  -DD     Physical Therapy Interventions (Optional Details) home exercise program;balance training;gait training;strengthening;ROM (Range of Motion)  -DD     PT Plan Comments Progress to [facility] as tolerates, ROM, gait and balance proprioception.  Ice.  -DD       User Key  (r) = Recorded By, (t) = Taken By, (c) = Cosigned By    Initials Name Provider Type    Jennifer Garsia, PT DPT Physical Therapist                  Exercises     Row Name 10/17/18 0800             Subjective Comments    Subjective Comments already better than before surgery  -DD         Subjective Pain    Able to rate subjective pain? yes  -DD      Pre-Treatment Pain Level 2  -DD         Exercise 1    Exercise Name 1 QS  -DD      Reps 1 10  -DD         Exercise 2    Exercise Name 2 SLR  -DD      Reps 2 10  -DD         Exercise 3    Exercise Name 3 iso add  -DD      Reps 3 20  -DD         Exercise 4    Exercise Name 4 SAQ  -DD      Reps 4 20  -DD         Exercise 5    Exercise Name 5 clamshells  -DD      Reps 5 20  -DD         Exercise 6    Exercise Name 6 ham sets  -DD      Reps 6 20  -DD         Exercise 7    Exercise Name 7 heel slides  -DD      Reps 7 20  -DD         Exercise 8    Exercise Name 8 calf and ham Stretch at steps  -DD      Reps 8 3/30\" each  -DD        User Key  (r) = Recorded By, (t) = Taken By, (c) = Cosigned By    Initials Name Provider Type    Jennifer Garsia, PT DPT Physical Therapist                        Outcome Measure Options: Lower Extremity Functional Scale (LEFS)  Lower Extremity Functional " Index  Any of your usual work, housework or school activities: A little bit of difficulty  Your usual hobbies, recreational or sporting activities: Moderate difficulty  Getting into or out of the bath: A little bit of difficulty  Walking between rooms: A little bit of difficulty  Putting on your shoes or socks: A little bit of difficulty  Squatting: Moderate difficulty  Lifting an object, like a bag of groceries from the floor: A little bit of difficulty  Performing light activities around your home: A little bit of difficulty  Performing heavy activities around your home: Moderate difficulty  Getting into or out of a car: A little bit of difficulty  Walking 2 blocks: Moderate difficulty  Walking a mile: Moderate difficulty  Going up or down 10 stairs (about 1 flight of stairs): Moderate difficulty  Standing for 1 hour: Moderate difficulty  Sitting for 1 hour: No difficulty  Running on even ground: Quite a bit of difficulty  Running on uneven ground: Quite a bit of difficulty  Making sharp turns while running fast: Quite a bit of difficulty  Hopping: Quite a bit of difficulty  Rolling over in bed: No difficulty  Total: 47      Time Calculation:     Therapy Suggested Charges     Code   Minutes Charges    None             Start Time: 0800  Stop Time: 0845  Time Calculation (min): 45 min  Total Timed Code Minutes- PT: 24 minute(s)     Therapy Charges for Today     Code Description Service Date Service Provider Modifiers Qty    44599385538 HC PT MOBILITY CURRENT 10/17/2018 Jennifer Briscoe, PT DPT GP, CJ 1    43560891601 HC PT MOBILITY PROJECTED 10/17/2018 Jennifer Briscoe PT DPT GP, CI 1    63595312797 HC PT THER PROC EA 15 MIN 10/17/2018 Jennifer Briscoe, PT DPT GP 2    43805837291 HC PT EVAL LOW COMPLEXITY 1 10/17/2018 Jennifer Briscoe PT DPT GP 1          PT G-Codes  PT Professional Judgement Used?: Yes  Outcome Measure Options: Lower Extremity Functional Scale (LEFS)  Total: 47  Functional  Limitation: Mobility: Walking and moving around  Mobility: Walking and Moving Around Current Status (): At least 20 percent but less than 40 percent impaired, limited or restricted  Mobility: Walking and Moving Around Goal Status (): At least 1 percent but less than 20 percent impaired, limited or restricted         Jennifer Briscoe, PT DPT  10/17/2018

## 2018-10-22 ENCOUNTER — HOSPITAL ENCOUNTER (OUTPATIENT)
Dept: PHYSICAL THERAPY | Facility: HOSPITAL | Age: 68
Setting detail: THERAPIES SERIES
Discharge: HOME | End: 2018-10-22

## 2018-10-22 DIAGNOSIS — M23.92 INTERNAL DERANGEMENT OF KNEE JOINT, LEFT: Primary | ICD-10-CM

## 2018-10-22 DIAGNOSIS — M25.562 ACUTE PAIN OF LEFT KNEE: ICD-10-CM

## 2018-10-22 PROCEDURE — 97110 THERAPEUTIC EXERCISES: CPT

## 2018-10-22 PROCEDURE — G0283 ELEC STIM OTHER THAN WOUND: HCPCS

## 2018-10-22 NOTE — THERAPY TREATMENT NOTE
Outpatient Physical Therapy Ortho Treatment Note  [facility]     Patient Name: Cari Brooks  : 1950  MRN: [id number]  Today's Date: 10/22/2018      Visit Date: 10/22/2018     Subjective Improvement 0  Visits 2/2  Visits approved medicare cap  RTMD 10-  Recert Date 11-          Visit Dx:    ICD-10-CM ICD-9-CM   1. Internal derangement of knee joint, left M23.92 717.9   2. Acute pain of left knee M25.562 719.46       Patient Active Problem List   Diagnosis   • Anxiety state   • Asthma   • Essential hypertension   • Vitamin D deficiency   • Osteoarthritis   • GERD without esophagitis   • Internal derangement of knee joint, left   • Left knee pain        Past Medical History:   Diagnosis Date   • Acute maxillary sinusitis    • Acute sinusitis    • Allergic rhinitis    • Anxiety state    • Asthma    • Bronchopneumonia    • Contact dermatitis due to plant    • Cough    • Edema of lower extremity    • Encounter for general adult medical examination without abnormal findings    • Encounter for immunization    • Encounter for routine adult health examination    • Equinus contracture of ankle    • Essential hypertension    • Fever    • GERD (gastroesophageal reflux disease)    • Heel pain    • Knee pain    • Osteoarthritis    • Otalgia    • Pain in limb    • Plantar fasciitis    • PONV (postoperative nausea and vomiting)    • Routine general medical examination at health care facility    • Screening for malignant neoplasm of colon    • Screening mammogram, encounter for    • Synovial cyst of knee    • Vitamin D deficiency         Past Surgical History:   Procedure Laterality Date   • BACK SURGERY  2018   • ENDOSCOPY  2012    Colon endoscopy 00651 (2)    Mild diverticulosis in sigmoid colon. 1 polyp in colon 35 cm from entry & 1 polyp in colon 15 cm from entry; both removed by cold biopsy polypectomy. Hemorrhoids found.    • HYSTERECTOMY      Hysterectomy w/ oophorectomy (1)      • INJECTION OF MEDICATION  09/03/2015    Kenalog (3)      • KNEE ARTHROSCOPY     • KNEE ARTHROSCOPY Left 10/15/2018    Procedure: KNEE ARTHROSCOPY with debridement medial meniscus  ;  Surgeon: Nathen Zapien MD;  Location: [facility];  Service: Orthopedics   • LAPAROSCOPIC CHOLECYSTECTOMY  10/04/2001   • OOPHORECTOMY     • PAP SMEAR  02/12/2009   • REPAIR ANKLE LIGAMENT  10/12/2004   • TONSILLECTOMY  06/20/1956             PT Ortho     Row Name 10/22/18 0900       Posture/Observations    Posture/Observations Comments slight antalgic  -CP       General ROM    GENERAL ROM COMMENTS 0-113  -CP      User Key  (r) = Recorded By, (t) = Taken By, (c) = Cosigned By    Initials Name Provider Type    CP Dilma Briscoe, PTA Physical Therapy Assistant                                Modalities     Row Name 10/22/18 0900             Ice    Ice Applied Yes  -CP      Location left knee  -CP      Rx Minutes --   20 minutes with IFC  -CP      Ice S/P Rx Yes  -CP         ELECTRICAL STIMULATION    Attended/Unattended Unattended  -CP      Stimulation Type IFC  -CP      Location/Electrode Placement/Other left knee  -CP      98364 - PT Electrical Stimulation Attended (Manual) Minutes 20  -CP        User Key  (r) = Recorded By, (t) = Taken By, (c) = Cosigned By    Initials Name Provider Type    CP Dilma Briscoe, PTA Physical Therapy Assistant                Exercises     Row Name 10/22/18 0900             Subjective Comments    Subjective Comments Knee feels tight.She had back surgery in June and states that her right LE is weakness also  -CP         Subjective Pain    Able to rate subjective pain? yes  -CP      Pre-Treatment Pain Level 2  -CP      Post-Treatment Pain Level 2  -CP         Exercise 1    Exercise Name 1 Pro II level 2  -CP      Time 1 10  -CP         Exercise 2    Exercise Name 2 Incline Stretch  -CP      Sets 2 3  -CP      Time 2 30  -CP         Exercise 3    Exercise Name 3 Standing HS Stretch  -CP       "Sets 3 3  -CP      Time 3 30  -CP         Exercise 4    Exercise Name 4 Lunge Stretch  -CP      Sets 4 3  -CP      Time 4 30  -CP         Exercise 5    Exercise Name 5 Step up 4\"  -CP      Sets 5 2  -CP      Reps 5 10  -CP      Additional Comments bilateral  -CP         Exercise 6    Exercise Name 6 mini squats  -CP      Sets 6 2  -CP      Reps 6 10  -CP         Exercise 7    Exercise Name 7 CR/TR  -CP      Sets 7 2  -CP      Reps 7 10  -CP         Exercise 8    Exercise Name 8 heel slides with strap  -CP      Reps 8 20  -CP         Exercise 9    Exercise Name 9 QS  -CP      Reps 9 20  -CP         Exercise 10    Exercise Name 10 SLR  -CP      Sets 10 2  -CP      Reps 10 10  -CP      Time 10 3\" hold  -CP        User Key  (r) = Recorded By, (t) = Taken By, (c) = Cosigned By    Initials Name Provider Type    CP Dilma Briscoe, PTA Physical Therapy Assistant                               PT OP Goals     Row Name 10/22/18 0900          PT Short Term Goals    STG Date to Achieve 11/07/18  -CP     STG 1 Pt will be independent in the HEP  -CP     STG 1 Progress Ongoing  -CP     STG 2 Pt will be able to perform 20 SLR without Ext lag  -CP     STG 2 Progress Progressing  -CP     STG 3 Pt will ambulate with normal gait pattern  -CP     STG 3 Progress Progressing  -CP     STG 4 Pt will have left knee ROM  0-130.  -CP     STG 4 Progress Partially Met  -CP     STG 5 Pt will have LEFS 60/80  -CP     STG 5 Progress Not Met  -CP        Long Term Goals    LTG Date to Achieve 11/07/18  -CP     LTG 1 PT will have 5/5 MMT quads and hamstrings Left.  -CP     LTG 1 Progress Progressing  -CP        Time Calculation    PT Goal Re-Cert Due Date 11/07/18  -CP       User Key  (r) = Recorded By, (t) = Taken By, (c) = Cosigned By    Initials Name Provider Type    CP Dilma Briscoe PTA Physical Therapy Assistant          Therapy Education  Education Details: heel slides with strap  Given: HEP  Program: New  How Provided: Verbal, " Demonstration  Provided to: Patient  Level of Understanding: Verbalized, Demonstrated              Time Calculation:   Start Time: 0845  Stop Time: 0950  Time Calculation (min): 65 min  Total Timed Code Minutes- PT: 45 minute(s)  Therapy Suggested Charges     Code   Minutes Charges    59188 (CPT®) Hc Pt Neuromusc Re Education Ea 15 Min      36548 (CPT®) Hc Pt Ther Proc Ea 15 Min      70338 (CPT®) Hc Gait Training Ea 15 Min      84418 (CPT®) Hc Pt Therapeutic Act Ea 15 Min      23475 (CPT®) Hc Pt Manual Therapy Ea 15 Min      38035 (CPT®) Hc Pt Ther Massage- Per 15 Min      20012 (CPT®) Hc Pt Iontophoresis Ea 15 Min      63671 (CPT®) Hc Pt Elec Stim Ea-Per 15 Min 20 1    45303 (CPT®) Hc Pt Ultrasound Ea 15 Min      60932 (CPT®) Hc Pt Self Care/Mgmt/Train Ea 15 Min      22310 (CPT®) Hc Pt Prosthetic (S) Train Initial Encounter, Each 15 Min      60230 (CPT®) Hc Orthotic(S) Mgmt/Train Initial Encounter, Each 15min      48312 (CPT®) Hc Pt Aquatic Therapy Ea 15 Min      58151 (CPT®) Hc Pt Orthotic(S)/Prosthetic(S) Encounter, Each 15 Min       (CPT®) Hc Pt Electrical Stim Unattended      Total  20 1        Therapy Charges for Today     Code Description Service Date Service Provider Modifiers Qty    80555215543 HC PT THER PROC EA 15 MIN 10/22/2018 Dilma Briscoe, PTA GP 3    10987181681 HC PT ELECTRICAL STIM UNATTENDED 10/22/2018 Dilma Briscoe, PTA  1    33722429941 HC PT THER SUPP EA 15 MIN 10/22/2018 Dilma Briscoe, PTA GP 1                    Dilma Briscoe, PTA  10/22/2018

## 2018-10-23 ENCOUNTER — HOSPITAL ENCOUNTER (EMERGENCY)
Facility: HOSPITAL | Age: 68
Discharge: HOME | End: 2018-10-23
Attending: EMERGENCY MEDICINE | Admitting: EMERGENCY MEDICINE

## 2018-10-23 VITALS
BODY MASS INDEX: 32.13 KG/M2 | HEART RATE: 68 BPM | SYSTOLIC BLOOD PRESSURE: 130 MMHG | HEIGHT: 67 IN | DIASTOLIC BLOOD PRESSURE: 66 MMHG | WEIGHT: 204.7 LBS | TEMPERATURE: 99 F | OXYGEN SATURATION: 97 % | RESPIRATION RATE: 18 BRPM

## 2018-10-23 DIAGNOSIS — E87.6 HYPOKALEMIA: ICD-10-CM

## 2018-10-23 DIAGNOSIS — R11.0 NAUSEA: Primary | ICD-10-CM

## 2018-10-23 LAB
ALBUMIN SERPL-MCNC: 4.5 G/DL (ref 3.4–4.8)
ALBUMIN/GLOB SERPL: 1.3 G/DL (ref 1.1–1.8)
ALP SERPL-CCNC: 109 U/L (ref 38–126)
ALT SERPL W P-5'-P-CCNC: 46 U/L (ref 9–52)
ANION GAP SERPL CALCULATED.3IONS-SCNC: 12 MMOL/L (ref 5–15)
AST SERPL-CCNC: 30 U/L (ref 14–36)
BACTERIA UR QL AUTO: ABNORMAL /HPF
BASOPHILS # BLD AUTO: 0.03 10*3/MM3 (ref 0–0.2)
BASOPHILS NFR BLD AUTO: 0.3 % (ref 0–2)
BILIRUB SERPL-MCNC: 0.5 MG/DL (ref 0.2–1.3)
BILIRUB UR QL STRIP: NEGATIVE
BUN BLD-MCNC: 20 MG/DL (ref 7–21)
BUN/CREAT SERPL: 19.8 (ref 7–25)
CALCIUM SPEC-SCNC: 10 MG/DL (ref 8.4–10.2)
CHLORIDE SERPL-SCNC: 96 MMOL/L (ref 95–110)
CLARITY UR: CLEAR
CO2 SERPL-SCNC: 27 MMOL/L (ref 22–31)
COLOR UR: YELLOW
CREAT BLD-MCNC: 1.01 MG/DL (ref 0.5–1)
DEPRECATED RDW RBC AUTO: 40.3 FL (ref 36.4–46.3)
EOSINOPHIL # BLD AUTO: 0.09 10*3/MM3 (ref 0–0.7)
EOSINOPHIL NFR BLD AUTO: 0.8 % (ref 0–7)
ERYTHROCYTE [DISTWIDTH] IN BLOOD BY AUTOMATED COUNT: 13.1 % (ref 11.5–14.5)
GFR SERPL CREATININE-BSD FRML MDRD: 55 ML/MIN/1.73 (ref 45–104)
GLOBULIN UR ELPH-MCNC: 3.6 GM/DL (ref 2.3–3.5)
GLUCOSE BLD-MCNC: 109 MG/DL (ref 60–100)
GLUCOSE UR STRIP-MCNC: NEGATIVE MG/DL
HCT VFR BLD AUTO: 42.4 % (ref 35–45)
HGB BLD-MCNC: 14.9 G/DL (ref 12–15.5)
HGB UR QL STRIP.AUTO: ABNORMAL
HOLD SPECIMEN: NORMAL
HOLD SPECIMEN: NORMAL
HYALINE CASTS UR QL AUTO: ABNORMAL /LPF
IMM GRANULOCYTES # BLD: 0.04 10*3/MM3 (ref 0–0.02)
IMM GRANULOCYTES NFR BLD: 0.4 % (ref 0–0.5)
KETONES UR QL STRIP: NEGATIVE
LEUKOCYTE ESTERASE UR QL STRIP.AUTO: ABNORMAL
LIPASE SERPL-CCNC: 91 U/L (ref 23–300)
LYMPHOCYTES # BLD AUTO: 2.83 10*3/MM3 (ref 0.6–4.2)
LYMPHOCYTES NFR BLD AUTO: 26.3 % (ref 10–50)
MCH RBC QN AUTO: 29.7 PG (ref 26.5–34)
MCHC RBC AUTO-ENTMCNC: 35.1 G/DL (ref 31.4–36)
MCV RBC AUTO: 84.6 FL (ref 80–98)
MONOCYTES # BLD AUTO: 0.7 10*3/MM3 (ref 0–0.9)
MONOCYTES NFR BLD AUTO: 6.5 % (ref 0–12)
NEUTROPHILS # BLD AUTO: 7.06 10*3/MM3 (ref 2–8.6)
NEUTROPHILS NFR BLD AUTO: 65.7 % (ref 37–80)
NITRITE UR QL STRIP: NEGATIVE
PH UR STRIP.AUTO: 7 [PH] (ref 5–9)
PLATELET # BLD AUTO: 319 10*3/MM3 (ref 150–450)
PMV BLD AUTO: 9 FL (ref 8–12)
POTASSIUM BLD-SCNC: 3.3 MMOL/L (ref 3.5–5.1)
PROT SERPL-MCNC: 8.1 G/DL (ref 6.3–8.6)
PROT UR QL STRIP: NEGATIVE
RBC # BLD AUTO: 5.01 10*6/MM3 (ref 3.77–5.16)
RBC # UR: ABNORMAL /HPF
REF LAB TEST METHOD: ABNORMAL
SODIUM BLD-SCNC: 135 MMOL/L (ref 137–145)
SP GR UR STRIP: 1.01 (ref 1–1.03)
SQUAMOUS #/AREA URNS HPF: ABNORMAL /HPF
TROPONIN I SERPL-MCNC: <0.012 NG/ML
UROBILINOGEN UR QL STRIP: ABNORMAL
WBC NRBC COR # BLD: 10.75 10*3/MM3 (ref 3.2–9.8)
WBC UR QL AUTO: ABNORMAL /HPF
WHOLE BLOOD HOLD SPECIMEN: NORMAL
WHOLE BLOOD HOLD SPECIMEN: NORMAL

## 2018-10-23 PROCEDURE — 96361 HYDRATE IV INFUSION ADD-ON: CPT

## 2018-10-23 PROCEDURE — 63710000001 PROMETHAZINE PER 25 MG: Performed by: EMERGENCY MEDICINE

## 2018-10-23 PROCEDURE — 85025 COMPLETE CBC W/AUTO DIFF WBC: CPT | Performed by: EMERGENCY MEDICINE

## 2018-10-23 PROCEDURE — 83690 ASSAY OF LIPASE: CPT | Performed by: EMERGENCY MEDICINE

## 2018-10-23 PROCEDURE — 99284 EMERGENCY DEPT VISIT MOD MDM: CPT

## 2018-10-23 PROCEDURE — 84484 ASSAY OF TROPONIN QUANT: CPT | Performed by: EMERGENCY MEDICINE

## 2018-10-23 PROCEDURE — 96374 THER/PROPH/DIAG INJ IV PUSH: CPT

## 2018-10-23 PROCEDURE — 80053 COMPREHEN METABOLIC PANEL: CPT | Performed by: EMERGENCY MEDICINE

## 2018-10-23 PROCEDURE — 81001 URINALYSIS AUTO W/SCOPE: CPT | Performed by: EMERGENCY MEDICINE

## 2018-10-23 PROCEDURE — 93005 ELECTROCARDIOGRAM TRACING: CPT | Performed by: EMERGENCY MEDICINE

## 2018-10-23 PROCEDURE — 25010000002 ONDANSETRON PER 1 MG: Performed by: EMERGENCY MEDICINE

## 2018-10-23 PROCEDURE — 93010 ELECTROCARDIOGRAM REPORT: CPT | Performed by: INTERNAL MEDICINE

## 2018-10-23 RX ORDER — PROMETHAZINE HYDROCHLORIDE 25 MG/1
25 TABLET ORAL ONCE
Status: COMPLETED | OUTPATIENT
Start: 2018-10-23 | End: 2018-10-23

## 2018-10-23 RX ORDER — POTASSIUM CHLORIDE 750 MG/1
20 CAPSULE, EXTENDED RELEASE ORAL ONCE
Status: COMPLETED | OUTPATIENT
Start: 2018-10-23 | End: 2018-10-23

## 2018-10-23 RX ORDER — PROMETHAZINE HYDROCHLORIDE 25 MG/1
25 TABLET ORAL EVERY 6 HOURS PRN
Qty: 10 TABLET | Refills: 0 | Status: SHIPPED | OUTPATIENT
Start: 2018-10-23 | End: 2018-10-26

## 2018-10-23 RX ORDER — SODIUM CHLORIDE 0.9 % (FLUSH) 0.9 %
10 SYRINGE (ML) INJECTION AS NEEDED
Status: DISCONTINUED | OUTPATIENT
Start: 2018-10-23 | End: 2018-10-24 | Stop reason: HOSPADM

## 2018-10-23 RX ORDER — HYDROXYZINE HYDROCHLORIDE 25 MG/1
25 TABLET, FILM COATED ORAL 3 TIMES DAILY PRN
COMMUNITY
End: 2018-12-14 | Stop reason: SDUPTHER

## 2018-10-23 RX ORDER — ONDANSETRON 2 MG/ML
4 INJECTION INTRAMUSCULAR; INTRAVENOUS ONCE
Status: COMPLETED | OUTPATIENT
Start: 2018-10-23 | End: 2018-10-23

## 2018-10-23 RX ADMIN — PROMETHAZINE HYDROCHLORIDE 25 MG: 25 TABLET ORAL at 22:22

## 2018-10-23 RX ADMIN — POTASSIUM CHLORIDE 20 MEQ: 750 CAPSULE, EXTENDED RELEASE ORAL at 21:25

## 2018-10-23 RX ADMIN — SODIUM CHLORIDE 1000 ML: 9 INJECTION, SOLUTION INTRAVENOUS at 20:51

## 2018-10-23 RX ADMIN — ONDANSETRON HYDROCHLORIDE 4 MG: 2 SOLUTION INTRAMUSCULAR; INTRAVENOUS at 20:51

## 2018-10-24 ENCOUNTER — HOSPITAL ENCOUNTER (OUTPATIENT)
Dept: PHYSICAL THERAPY | Facility: HOSPITAL | Age: 68
Setting detail: THERAPIES SERIES
Discharge: HOME | End: 2018-10-24

## 2018-10-24 DIAGNOSIS — M25.562 ACUTE PAIN OF LEFT KNEE: ICD-10-CM

## 2018-10-24 DIAGNOSIS — M23.92 INTERNAL DERANGEMENT OF KNEE JOINT, LEFT: Primary | ICD-10-CM

## 2018-10-24 PROCEDURE — 97110 THERAPEUTIC EXERCISES: CPT

## 2018-10-24 PROCEDURE — G0283 ELEC STIM OTHER THAN WOUND: HCPCS

## 2018-10-24 NOTE — THERAPY TREATMENT NOTE
Outpatient Physical Therapy Ortho Treatment Note  [facility]     Patient Name: Cari Brooks  : 1950  MRN: [id number]  Today's Date: 10/24/2018      Visit Date: 10/24/2018     Subjective Improvement 25%  Visits 3/3  Visits approved Medicare cap  RTMD 10-  MD note faxed  Recert date 11-    S/P left knee arthroscopy on 10-    Visit Dx:    ICD-10-CM ICD-9-CM   1. Internal derangement of knee joint, left M23.92 717.9   2. Acute pain of left knee M25.562 719.46       Patient Active Problem List   Diagnosis   • Anxiety state   • Asthma   • Essential hypertension   • Vitamin D deficiency   • Osteoarthritis   • GERD without esophagitis   • Internal derangement of knee joint, left   • Left knee pain        Past Medical History:   Diagnosis Date   • Acute maxillary sinusitis    • Acute sinusitis    • Allergic rhinitis    • Anxiety state    • Asthma    • Bronchopneumonia    • Contact dermatitis due to plant    • Cough    • Edema of lower extremity    • Encounter for general adult medical examination without abnormal findings    • Encounter for immunization    • Encounter for routine adult health examination    • Equinus contracture of ankle    • Essential hypertension    • Fever    • GERD (gastroesophageal reflux disease)    • Heel pain    • Knee pain    • Osteoarthritis    • Otalgia    • Pain in limb    • Plantar fasciitis    • PONV (postoperative nausea and vomiting)    • Routine general medical examination at health care facility    • Screening for malignant neoplasm of colon    • Screening mammogram, encounter for    • Synovial cyst of knee    • Vitamin D deficiency         Past Surgical History:   Procedure Laterality Date   • BACK SURGERY  2018   • ENDOSCOPY  2012    Colon endoscopy 14526 (2)    Mild diverticulosis in sigmoid colon. 1 polyp in colon 35 cm from entry & 1 polyp in colon 15 cm from entry; both removed by cold biopsy polypectomy. Hemorrhoids found.    •  HYSTERECTOMY  1986    Hysterectomy w/ oophorectomy (1)     • INJECTION OF MEDICATION  09/03/2015    Kenalog (3)      • KNEE ARTHROSCOPY     • KNEE ARTHROSCOPY Left 10/15/2018    Procedure: KNEE ARTHROSCOPY with debridement medial meniscus  ;  Surgeon: Nathen Zapien MD;  Location: [facility];  Service: Orthopedics   • LAPAROSCOPIC CHOLECYSTECTOMY  10/04/2001   • OOPHORECTOMY     • PAP SMEAR  02/12/2009   • REPAIR ANKLE LIGAMENT  10/12/2004   • TONSILLECTOMY  06/20/1956             PT Ortho     Row Name 10/24/18 1000       General ROM    GENERAL ROM COMMENTS 0-130  -CP       MMT (Manual Muscle Testing)    General MMT Comments no quad lag with SLR  -CP    Row Name 10/22/18 0900       Posture/Observations    Posture/Observations Comments slight antalgic  -CP       General ROM    GENERAL ROM COMMENTS 0-113  -CP      User Key  (r) = Recorded By, (t) = Taken By, (c) = Cosigned By    Initials Name Provider Type    CP Dilma Briscoe, PTA Physical Therapy Assistant                            PT Assessment/Plan     Row Name 10/24/18 1033          PT Assessment    Assessment Comments Increase AROM.  Patient is progressing nicely.    -CP        PT Plan    PT Frequency 2x/week  -CP     Predicted Duration of Therapy Intervention (Therapy Eval) 3 weeks  -CP     PT Plan Comments Cont with POC.  Increase ckc Activities.  MD note was faxed to MD  -CP       User Key  (r) = Recorded By, (t) = Taken By, (c) = Cosigned By    Initials Name Provider Type    CP Dilma Briscoe PTA Physical Therapy Assistant                Modalities     Row Name 10/24/18 0900             Ice    Ice Applied Yes  -CP      Location left knee  -CP      Rx Minutes --   20 minutes with IFC  -CP      Ice S/P Rx Yes  -CP         ELECTRICAL STIMULATION    Attended/Unattended Unattended  -CP      Stimulation Type IFC  -CP      Location/Electrode Placement/Other left knee  -CP      81407 - PT Electrical Stimulation Attended (Manual) Minutes 20  -CP       "  User Key  (r) = Recorded By, (t) = Taken By, (c) = Cosigned By    Initials Name Provider Type    Dilma Collado PTA Physical Therapy Assistant                Exercises     Row Name 10/24/18 0900             Subjective Comments    Subjective Comments Patient states that she is feeling better. Cont to rate knee pain as2/10  -CP         Subjective Pain    Able to rate subjective pain? yes  -CP      Pre-Treatment Pain Level 2  -CP      Post-Treatment Pain Level 0  -CP         Exercise 1    Exercise Name 1 Pro II level 2  -CP      Time 1 10  -CP         Exercise 2    Exercise Name 2 incline stretch  -CP      Sets 2 3  -CP      Time 2 30  -CP         Exercise 3    Exercise Name 3 standing HS stretch  -CP      Sets 3 3  -CP      Time 3 30  -CP         Exercise 4    Exercise Name 4 Lunge stretch  -CP      Sets 4 3  -CP      Time 4 30  -CP         Exercise 5    Exercise Name 5 gait in clinic  -CP      Time 5 2 laps  -CP         Exercise 6    Exercise Name 6 Step up 4\"  -CP      Sets 6 2  -CP      Reps 6 10  -CP         Exercise 7    Exercise Name 7 mini squats  -CP      Sets 7 2  -CP      Reps 7 10  -CP         Exercise 8    Exercise Name 8 CR/TR  -CP      Sets 8 2  -CP      Reps 8 10  -CP         Exercise 9    Exercise Name 9 up and down ramp in rehab gym  -CP      Reps 9 5  -CP         Exercise 10    Exercise Name 10 step up and over 4\"  -CP      Sets 10 2  -CP      Reps 10 10  -CP         Exercise 11    Exercise Name 11 Heel slides with strap  -CP      Reps 11 20  -CP         Exercise 12    Exercise Name 12 SLR  -CP      Reps 12 2  -CP      Time 12 10  -CP        User Key  (r) = Recorded By, (t) = Taken By, (c) = Cosigned By    Initials Name Provider Type    CP Dilma Briscoe PTA Physical Therapy Assistant                               PT OP Goals     Row Name 10/24/18 1000          PT Short Term Goals    STG Date to Achieve 11/07/18  -CP     STG 1 Pt will be independent in the HEP  -CP     STG 1 Progress " Ongoing  -CP     STG 2 Pt will be able to perform 20 SLR without Ext lag  -CP     STG 2 Progress Met  -CP     STG 3 Pt will ambulate with normal gait pattern  -CP     STG 3 Progress Met   slow gait patern  -CP     STG 4 Pt will have left knee ROM  0-130.  -CP     STG 4 Progress Met  -CP     STG 5 Pt will have LEFS 60/80  -CP     STG 5 Progress Not Met  -CP        Long Term Goals    LTG Date to Achieve 11/07/18  -CP     LTG 1 PT will have 5/5 MMT quads and hamstrings Left.  -CP     LTG 1 Progress Progressing  -CP       User Key  (r) = Recorded By, (t) = Taken By, (c) = Cosigned By    Initials Name Provider Type    CP Dilma Briscoe, PTA Physical Therapy Assistant                         Time Calculation:   Start Time: 0935  Stop Time: 1040  Time Calculation (min): 65 min  Total Timed Code Minutes- PT: 45 minute(s)  Therapy Suggested Charges     Code   Minutes Charges    49093 (CPT®) Hc Pt Neuromusc Re Education Ea 15 Min      14317 (CPT®) Hc Pt Ther Proc Ea 15 Min      64096 (CPT®) Hc Gait Training Ea 15 Min      51589 (CPT®) Hc Pt Therapeutic Act Ea 15 Min      72598 (CPT®) Hc Pt Manual Therapy Ea 15 Min      69542 (CPT®) Hc Pt Ther Massage- Per 15 Min      10418 (CPT®) Hc Pt Iontophoresis Ea 15 Min      20888 (CPT®) Hc Pt Elec Stim Ea-Per 15 Min 20 1    13557 (CPT®) Hc Pt Ultrasound Ea 15 Min      24913 (CPT®) Hc Pt Self Care/Mgmt/Train Ea 15 Min      99435 (CPT®) Hc Pt Prosthetic (S) Train Initial Encounter, Each 15 Min      81086 (CPT®) Hc Orthotic(S) Mgmt/Train Initial Encounter, Each 15min      39173 (CPT®) Hc Pt Aquatic Therapy Ea 15 Min      49755 (CPT®) Hc Pt Orthotic(S)/Prosthetic(S) Encounter, Each 15 Min       (CPT®) Hc Pt Electrical Stim Unattended      Total  20 1        Therapy Charges for Today     Code Description Service Date Service Provider Modifiers Qty    99879359496 HC PT ELECTRICAL STIM UNATTENDED 10/24/2018 Dilma Briscoe, PTA  1    75404663011 HC PT THER SUPP EA 15 MIN 10/24/2018  Dilma Briscoe, REA GP 1    10294465861 HC PT THER PROC EA 15 MIN 10/24/2018 Dilma Briscoe, REA GP 3                    Dilma Briscoe PTA  10/24/2018

## 2018-10-24 NOTE — ED PROVIDER NOTES
"Subjective   67-year-old white female presents to the emergency department with chief complaint of nausea and weakness.  Patient complains of nausea and generalized weakness that started approximately 2:00 this afternoon.  Her symptoms started at rest.  Nothing makes her symptoms better or worse.  Patient states \"I feel like if I throw up, then I'm going to pass out\".  She's had similar symptoms a few times in the past, the last episode was several months ago.  She denies fever sweats or chills.  She denies chest pain or shortness of breath.  She denies abdominal pain vomiting or diarrhea.  She denies headache, numbness, focal weakness, or change in vision or speech.            Review of Systems   Gastrointestinal: Positive for nausea.   Neurological: Positive for weakness.   All other systems reviewed and are negative.      Past Medical History:   Diagnosis Date   • Acute maxillary sinusitis    • Acute sinusitis    • Allergic rhinitis    • Anxiety state    • Asthma    • Bronchopneumonia    • Contact dermatitis due to plant    • Cough    • Edema of lower extremity    • Encounter for general adult medical examination without abnormal findings    • Encounter for immunization    • Encounter for routine adult health examination    • Equinus contracture of ankle    • Essential hypertension    • Fever    • GERD (gastroesophageal reflux disease)    • Heel pain    • Knee pain    • Osteoarthritis    • Otalgia    • Pain in limb    • Plantar fasciitis    • PONV (postoperative nausea and vomiting)    • Routine general medical examination at health care facility    • Screening for malignant neoplasm of colon    • Screening mammogram, encounter for    • Synovial cyst of knee    • Vitamin D deficiency        Allergies   Allergen Reactions   • Zestril [Lisinopril] Shortness Of Breath   • Penicillins Rash       Past Surgical History:   Procedure Laterality Date   • BACK SURGERY  06/2018   • ENDOSCOPY  11/13/2012    Colon endoscopy " 62310 (2)    Mild diverticulosis in sigmoid colon. 1 polyp in colon 35 cm from entry & 1 polyp in colon 15 cm from entry; both removed by cold biopsy polypectomy. Hemorrhoids found.    • HYSTERECTOMY  1986    Hysterectomy w/ oophorectomy (1)     • INJECTION OF MEDICATION  09/03/2015    Kenalog (3)      • KNEE ARTHROSCOPY     • KNEE ARTHROSCOPY Left 10/15/2018    Procedure: KNEE ARTHROSCOPY with debridement medial meniscus  ;  Surgeon: Nathen Zapien MD;  Location: [facility];  Service: Orthopedics   • LAPAROSCOPIC CHOLECYSTECTOMY  10/04/2001   • OOPHORECTOMY     • PAP SMEAR  02/12/2009   • REPAIR ANKLE LIGAMENT  10/12/2004   • TONSILLECTOMY  06/20/1956       Family History   Problem Relation Age of Onset   • Cancer Mother         breast   • Breast cancer Mother    • Arthritis Mother    • Hypertension Mother    • ALS Father    • Cancer Father    • Hypertension Father    • Diabetes Other    • Hypertension Other    • Cancer Other         stomach       Social History     Social History   • Marital status:      Social History Main Topics   • Smoking status: Never Smoker   • Smokeless tobacco: Never Used   • Alcohol use No   • Drug use: No   • Sexual activity: Defer     Other Topics Concern   • Not on file           Objective   Physical Exam   Constitutional: She is oriented to person, place, and time. She appears well-developed and well-nourished. No distress.   HENT:   Head: Normocephalic and atraumatic.   Right Ear: External ear normal.   Left Ear: External ear normal.   Nose: Nose normal.   Mouth/Throat: Oropharynx is clear and moist.   Eyes: Pupils are equal, round, and reactive to light. Conjunctivae and EOM are normal.   Neck: Normal range of motion. Neck supple.   Cardiovascular: Normal rate, regular rhythm, normal heart sounds and intact distal pulses.    Pulmonary/Chest: Effort normal and breath sounds normal.   Abdominal: Soft. Bowel sounds are normal. She exhibits no distension and no mass.  There is no tenderness. There is no rebound and no guarding.   Musculoskeletal: Normal range of motion. She exhibits no edema, tenderness or deformity.   Neurological: She is alert and oriented to person, place, and time. No cranial nerve deficit or sensory deficit. She exhibits normal muscle tone. Coordination normal.   Skin: Skin is warm and dry. She is not diaphoretic.   Psychiatric: She has a normal mood and affect. Her behavior is normal.   Nursing note and vitals reviewed.      ECG 12 Lead    Date/Time: 10/23/2018 8:14 PM  Performed by: NIRMAL MCDONOUGH  Authorized by: NIRMAL MCDONOUGH   Interpreted by physician  Comments: EKG shows a normal sinus rhythm rate of 79 with nonspecific findings.                 ED Course  ED Course as of Oct 23 2144   Tue Oct 23, 2018   2141 Patient relates she feels better after treatment.  She is alert and resting comfortably.  I reviewed the results of her EKG and laboratory data with her.  I explained the importance of follow-up with her primary care physician.  I advised her to return to the emergency department if her symptoms change or worsen.  [DR]      ED Course User Index  [DR] Nirmal Mcdonough, MD      Labs Reviewed   COMPREHENSIVE METABOLIC PANEL - Abnormal; Notable for the following:        Result Value    Glucose 109 (*)     Creatinine 1.01 (*)     Sodium 135 (*)     Potassium 3.3 (*)     Globulin 3.6 (*)     All other components within normal limits   URINALYSIS W/ MICROSCOPIC IF INDICATED (NO CULTURE) - Abnormal; Notable for the following:     Blood, UA Trace (*)     Leuk Esterase, UA Trace (*)     All other components within normal limits   CBC WITH AUTO DIFFERENTIAL - Abnormal; Notable for the following:     WBC 10.75 (*)     Immature Grans, Absolute 0.04 (*)     All other components within normal limits   URINALYSIS, MICROSCOPIC ONLY - Abnormal; Notable for the following:     RBC, UA 0-2 (*)     All other components within normal limits   LIPASE - Normal   TROPONIN  (IN-HOUSE) - Normal   RAINBOW DRAW    Narrative:     The following orders were created for panel order [facility] Draw.  Procedure                               Abnormality         Status                     ---------                               -----------         ------                     Light Blue Top[[id number]]                                   Final result               Green Top (Gel)[[id number]]                                  Final result               Lavender Top[[id number]]                                     Final result               Gold Top - SST[[id number]]                                   Final result                 Please view results for these tests on the individual orders.   CBC AND DIFFERENTIAL    Narrative:     The following orders were created for panel order CBC & Differential.  Procedure                               Abnormality         Status                     ---------                               -----------         ------                     CBC Auto Differential[[id number]]        Abnormal            Final result                 Please view results for these tests on the individual orders.   LIGHT BLUE TOP   GREEN TOP   LAVENDER TOP   GOLD TOP - SST     No results found.            [facility]      Final diagnoses:   Nausea   Hypokalemia            Osvaldo Villalobos MD  10/23/18 8440

## 2018-10-26 ENCOUNTER — OFFICE VISIT (OUTPATIENT)
Dept: ORTHOPEDIC SURGERY | Facility: CLINIC | Age: 68
End: 2018-10-26

## 2018-10-26 VITALS — HEIGHT: 67 IN | WEIGHT: 206 LBS | BODY MASS INDEX: 32.33 KG/M2

## 2018-10-26 DIAGNOSIS — S83.212D BUCKET HANDLE TEAR OF MEDIAL MENISCUS OF LEFT KNEE, SUBSEQUENT ENCOUNTER: ICD-10-CM

## 2018-10-26 DIAGNOSIS — M23.92 INTERNAL DERANGEMENT OF KNEE JOINT, LEFT: Primary | ICD-10-CM

## 2018-10-26 DIAGNOSIS — M25.562 LEFT KNEE PAIN, UNSPECIFIED CHRONICITY: ICD-10-CM

## 2018-10-26 PROCEDURE — 99024 POSTOP FOLLOW-UP VISIT: CPT | Performed by: ORTHOPAEDIC SURGERY

## 2018-10-26 NOTE — PROGRESS NOTES
Cari Brooks is a 67 y.o. female is s/p       Chief Complaint   Patient presents with   • Left Knee - Post-op       HISTORY OF PRESENT ILLNESS:   Post op   Arthroscopy left knee, debridement of medial meniscus  10/15/18    Suture removal, steri strips applied   Therapy at [facility], Patient brought progress report.  She states the therapy is helping with recovery.      Allergies   Allergen Reactions   • Zestril [Lisinopril] Shortness Of Breath   • Penicillins Rash         Current Outpatient Prescriptions:   •  amLODIPine (NORVASC) 10 MG tablet, Take 1 tablet by mouth Daily. for blood pressure, Disp: 90 tablet, Rfl: 3  •  Cholecalciferol (D3-1000) 1000 UNITS capsule, Take 1,000 Units by mouth Daily. cholecalciferol (vitamin D3) 1,000 unit capsule, Disp: , Rfl:   •  hydrOXYzine (ATARAX) 25 MG tablet, Take 25 mg by mouth 3 (Three) Times a Day As Needed., Disp: , Rfl:   •  losartan-hydrochlorothiazide (HYZAAR) 100-25 MG per tablet, Take 1 tablet by mouth Daily. for blood pressure, Disp: 90 tablet, Rfl: 3  •  omeprazole (priLOSEC) 20 MG capsule, Take 20 mg by mouth Daily As Needed., Disp: , Rfl:   •  potassium chloride (MICRO-K) 8 MEQ CR capsule, Take 1 capsule by mouth 2 (Two) Times a Day., Disp: 180 capsule, Rfl: 3  •  pseudoephedrine (SUDAFED) 30 MG tablet, Take 1 tablet by mouth 2 (Two) Times a Day As Needed for Congestion., Disp: 30 tablet, Rfl: 0    No fevers or chills.  No nausea or vomiting.      PHYSICAL EXAMINATION:       Cari Brooks is a 67 y.o. female    Patient is awake and alert, answers questions appropriately and is in no apparent distress.    GAIT:     [x]  Normal  []  Antalgic    Assistive device: [x]  None  []  Walker     []  Crutches  []  Cane     []  Wheelchair  []  Stretcher    Ortho Exam  No erythema  Mild swelling  No effusion  Incision clean and dry            ASSESSMENT:    Diagnoses and all orders for this visit:    Internal derangement of knee joint, left    Left knee pain,  unspecified chronicity    Bucket handle tear of medial meniscus of left knee, subsequent encounter          PLAN    Activity as tolerated  Progress motion and activity  Continue PT with HEP  No restrictions  Continue icing after activity    Return in about 4 weeks (around 11/23/2018) for recheck.    Nathen Zapien MD

## 2018-10-29 ENCOUNTER — HOSPITAL ENCOUNTER (OUTPATIENT)
Dept: PHYSICAL THERAPY | Facility: HOSPITAL | Age: 68
Setting detail: THERAPIES SERIES
Discharge: HOME | End: 2018-10-29

## 2018-10-29 DIAGNOSIS — M23.92 INTERNAL DERANGEMENT OF KNEE JOINT, LEFT: Primary | ICD-10-CM

## 2018-10-29 DIAGNOSIS — M25.562 ACUTE PAIN OF LEFT KNEE: ICD-10-CM

## 2018-10-29 PROCEDURE — G0283 ELEC STIM OTHER THAN WOUND: HCPCS

## 2018-10-29 PROCEDURE — 97110 THERAPEUTIC EXERCISES: CPT

## 2018-10-29 NOTE — THERAPY TREATMENT NOTE
Outpatient Physical Therapy Ortho Treatment Note  [facility]     Patient Name: Cari Brooks  : 1950  MRN: [id number]  Today's Date: 10/29/2018      Visit Date: 10/29/2018     Subjective Improvement not assessed this date  Visits 4/4  Visits approved medicare cap  RTMD 2018  Recert Date 11-    S/p Left knee arthroscopy on 10-    Visit Dx:    ICD-10-CM ICD-9-CM   1. Internal derangement of knee joint, left M23.92 717.9   2. Acute pain of left knee M25.562 719.46       Patient Active Problem List   Diagnosis   • Anxiety state   • Asthma   • Essential hypertension   • Vitamin D deficiency   • Osteoarthritis   • GERD without esophagitis   • Internal derangement of knee joint, left   • Left knee pain        Past Medical History:   Diagnosis Date   • Acute maxillary sinusitis    • Acute sinusitis    • Allergic rhinitis    • Anxiety state    • Asthma    • Bronchopneumonia    • Contact dermatitis due to plant    • Cough    • Edema of lower extremity    • Encounter for general adult medical examination without abnormal findings    • Encounter for immunization    • Encounter for routine adult health examination    • Equinus contracture of ankle    • Essential hypertension    • Fever    • GERD (gastroesophageal reflux disease)    • Heel pain    • Knee pain    • Osteoarthritis    • Otalgia    • Pain in limb    • Plantar fasciitis    • PONV (postoperative nausea and vomiting)    • Routine general medical examination at health care facility    • Screening for malignant neoplasm of colon    • Screening mammogram, encounter for    • Synovial cyst of knee    • Vitamin D deficiency         Past Surgical History:   Procedure Laterality Date   • BACK SURGERY  2018   • ENDOSCOPY  2012    Colon endoscopy 80097 (2)    Mild diverticulosis in sigmoid colon. 1 polyp in colon 35 cm from entry & 1 polyp in colon 15 cm from entry; both removed by cold biopsy polypectomy. Hemorrhoids found.     • HYSTERECTOMY  1986    Hysterectomy w/ oophorectomy (1)     • INJECTION OF MEDICATION  09/03/2015    Kenalog (3)      • KNEE ARTHROSCOPY     • KNEE ARTHROSCOPY Left 10/15/2018    Procedure: KNEE ARTHROSCOPY with debridement medial meniscus  ;  Surgeon: Nathen Zapien MD;  Location: [facility];  Service: Orthopedics   • LAPAROSCOPIC CHOLECYSTECTOMY  10/04/2001   • OOPHORECTOMY     • PAP SMEAR  02/12/2009   • REPAIR ANKLE LIGAMENT  10/12/2004   • TONSILLECTOMY  06/20/1956                             PT Assessment/Plan     Row Name 10/29/18 1105          PT Assessment    Assessment Comments Patient is progressing nicely.  Patients increase pain this date maybe secondary to patient doing to much  -CP        PT Plan    PT Frequency 2x/week  -CP     Predicted Duration of Therapy Intervention (Therapy Eval) 3 weeks  -CP     PT Plan Comments Cont with POC.  Progressing as tolerated  -CP       User Key  (r) = Recorded By, (t) = Taken By, (c) = Cosigned By    Initials Name Provider Type    CP Dilma Briscoe, PTA Physical Therapy Assistant                Modalities     Row Name 10/29/18 0900             Ice    Ice Applied Yes  -CP      Location left knee  -CP      Rx Minutes --   20 minutes with ifc  -CP      Ice S/P Rx Yes  -CP         ELECTRICAL STIMULATION    Attended/Unattended Unattended  -CP      Stimulation Type IFC  -CP      Location/Electrode Placement/Other left knee  -CP      84044 - PT Electrical Stimulation Attended (Manual) Minutes 20  -CP        User Key  (r) = Recorded By, (t) = Taken By, (c) = Cosigned By    Initials Name Provider Type    CP Dilma Briscoe PTA Physical Therapy Assistant                Exercises     Row Name 10/29/18 0900             Subjective Comments    Subjective Comments Patient states that her knee is more stiff than painful.    -CP         Subjective Pain    Able to rate subjective pain? yes  -CP      Pre-Treatment Pain Level 4  -CP      Post-Treatment Pain Level 2  -CP  "        Exercise 1    Exercise Name 1 Pro II level 3  -CP      Time 1 10  -CP         Exercise 2    Exercise Name 2 incline stretch  -CP      Sets 2 3  -CP      Time 2 30  -CP         Exercise 3    Exercise Name 3 Standing HS stretch  -CP      Sets 3 3  -CP      Time 3 30  -CP         Exercise 4    Exercise Name 4 Mini squats   -CP      Sets 4 2  -CP      Reps 4 10  -CP         Exercise 5    Exercise Name 5 step up 6\"  -CP      Sets 5 2  -CP      Reps 5 10  -CP         Exercise 6    Exercise Name 6 lat step up 4\"  -CP      Sets 6 2  -CP      Reps 6 10  -CP         Exercise 7    Exercise Name 7 step up and over 4\"  -CP      Sets 7 2  -CP      Reps 7 10  -CP         Exercise 8    Exercise Name 8 heel slides with strap  -CP      Sets 8 3  -CP      Reps 8 30  -CP         Exercise 9    Exercise Name 9 SLr  -CP      Sets 9 3  -CP      Reps 9 10  -CP        User Key  (r) = Recorded By, (t) = Taken By, (c) = Cosigned By    Initials Name Provider Type    CP Dilma Briscoe, REA Physical Therapy Assistant                               PT OP Goals     Row Name 10/29/18 1000          PT Short Term Goals    STG Date to Achieve 11/07/18  -CP     STG 1 Pt will be independent in the HEP  -CP     STG 1 Progress Ongoing  -CP     STG 2 Pt will be able to perform 20 SLR without Ext lag  -CP     STG 2 Progress Met  -CP     STG 3 Pt will ambulate with normal gait pattern  -CP     STG 3 Progress Met   slow gait patern  -CP     STG 4 Pt will have left knee ROM  0-130.  -CP     STG 4 Progress Met  -CP     STG 5 Pt will have LEFS 60/80  -CP     STG 5 Progress Not Met  -CP        Long Term Goals    LTG Date to Achieve 11/07/18  -CP     LTG 1 PT will have 5/5 MMT quads and hamstrings Left.  -CP     LTG 1 Progress Progressing  -CP        Time Calculation    PT Goal Re-Cert Due Date 11/10/18  -CP       User Key  (r) = Recorded By, (t) = Taken By, (c) = Cosigned By    Initials Name Provider Type    CP Dilma Briscoe, REA Physical Therapy " Assistant                         Time Calculation:   Start Time: 0935  Stop Time: 1038  Time Calculation (min): 63 min  Total Timed Code Minutes- PT: 45 minute(s)  Therapy Suggested Charges     Code   Minutes Charges    37323 (CPT®) Hc Pt Neuromusc Re Education Ea 15 Min      27596 (CPT®) Hc Pt Ther Proc Ea 15 Min      08419 (CPT®) Hc Gait Training Ea 15 Min      97438 (CPT®) Hc Pt Therapeutic Act Ea 15 Min      86349 (CPT®) Hc Pt Manual Therapy Ea 15 Min      19555 (CPT®) Hc Pt Ther Massage- Per 15 Min      66876 (CPT®) Hc Pt Iontophoresis Ea 15 Min      21559 (CPT®) Hc Pt Elec Stim Ea-Per 15 Min 20 1    05151 (CPT®) Hc Pt Ultrasound Ea 15 Min      46782 (CPT®) Hc Pt Self Care/Mgmt/Train Ea 15 Min      82991 (CPT®) Hc Pt Prosthetic (S) Train Initial Encounter, Each 15 Min      94758 (CPT®) Hc Orthotic(S) Mgmt/Train Initial Encounter, Each 15min      14894 (CPT®) Hc Pt Aquatic Therapy Ea 15 Min      47979 (CPT®) Hc Pt Orthotic(S)/Prosthetic(S) Encounter, Each 15 Min       (CPT®) Hc Pt Electrical Stim Unattended      Total  20 1        Therapy Charges for Today     Code Description Service Date Service Provider Modifiers Qty    70528608783 HC PT THER PROC EA 15 MIN 10/29/2018 Dilma Briscoe, PTA GP 3    29124821934 HC PT ELECTRICAL STIM UNATTENDED 10/29/2018 Dilma Briscoe, PTA  1                    Dilma Briscoe, REA  10/29/2018

## 2018-10-31 ENCOUNTER — HOSPITAL ENCOUNTER (OUTPATIENT)
Dept: PHYSICAL THERAPY | Facility: HOSPITAL | Age: 68
Setting detail: THERAPIES SERIES
Discharge: HOME | End: 2018-10-31

## 2018-10-31 DIAGNOSIS — M23.92 INTERNAL DERANGEMENT OF KNEE JOINT, LEFT: Primary | ICD-10-CM

## 2018-10-31 DIAGNOSIS — M25.562 ACUTE PAIN OF LEFT KNEE: ICD-10-CM

## 2018-10-31 PROCEDURE — G0283 ELEC STIM OTHER THAN WOUND: HCPCS

## 2018-10-31 PROCEDURE — 97110 THERAPEUTIC EXERCISES: CPT

## 2018-11-05 ENCOUNTER — HOSPITAL ENCOUNTER (OUTPATIENT)
Dept: PHYSICAL THERAPY | Facility: HOSPITAL | Age: 68
Setting detail: THERAPIES SERIES
Discharge: HOME | End: 2018-11-05

## 2018-11-05 DIAGNOSIS — M23.92 INTERNAL DERANGEMENT OF KNEE JOINT, LEFT: Primary | ICD-10-CM

## 2018-11-05 DIAGNOSIS — M25.562 ACUTE PAIN OF LEFT KNEE: ICD-10-CM

## 2018-11-05 PROCEDURE — G0283 ELEC STIM OTHER THAN WOUND: HCPCS

## 2018-11-05 PROCEDURE — 97110 THERAPEUTIC EXERCISES: CPT

## 2018-11-05 NOTE — THERAPY TREATMENT NOTE
Outpatient Physical Therapy Ortho Treatment Note  [facility]     Patient Name: Cari Brooks  : 1950  MRN: [id number]  Today's Date: 2018      Visit Date: 2018     Subjective Improvement 95%  Visits 6/6  Visits approved medicare cap  RTMD 2018  Recert Date 11-    S/P Left Knee Arthroscopy on 10-      Visit Dx:    ICD-10-CM ICD-9-CM   1. Internal derangement of knee joint, left M23.92 717.9   2. Acute pain of left knee M25.562 719.46       Patient Active Problem List   Diagnosis   • Anxiety state   • Asthma   • Essential hypertension   • Vitamin D deficiency   • Osteoarthritis   • GERD without esophagitis   • Internal derangement of knee joint, left   • Left knee pain        Past Medical History:   Diagnosis Date   • Acute maxillary sinusitis    • Acute sinusitis    • Allergic rhinitis    • Anxiety state    • Asthma    • Bronchopneumonia    • Contact dermatitis due to plant    • Cough    • Edema of lower extremity    • Encounter for general adult medical examination without abnormal findings    • Encounter for immunization    • Encounter for routine adult health examination    • Equinus contracture of ankle    • Essential hypertension    • Fever    • GERD (gastroesophageal reflux disease)    • Heel pain    • Knee pain    • Osteoarthritis    • Otalgia    • Pain in limb    • Plantar fasciitis    • PONV (postoperative nausea and vomiting)    • Routine general medical examination at health care facility    • Screening for malignant neoplasm of colon    • Screening mammogram, encounter for    • Synovial cyst of knee    • Vitamin D deficiency         Past Surgical History:   Procedure Laterality Date   • BACK SURGERY  2018   • ENDOSCOPY  2012    Colon endoscopy 50769 (2)    Mild diverticulosis in sigmoid colon. 1 polyp in colon 35 cm from entry & 1 polyp in colon 15 cm from entry; both removed by cold biopsy polypectomy. Hemorrhoids found.    • HYSTERECTOMY   1986    Hysterectomy w/ oophorectomy (1)     • INJECTION OF MEDICATION  09/03/2015    Kenalog (3)      • KNEE ARTHROSCOPY     • KNEE ARTHROSCOPY Left 10/15/2018    Procedure: KNEE ARTHROSCOPY with debridement medial meniscus  ;  Surgeon: Nathen Zapien MD;  Location: [facility];  Service: Orthopedics   • LAPAROSCOPIC CHOLECYSTECTOMY  10/04/2001   • OOPHORECTOMY     • PAP SMEAR  02/12/2009   • REPAIR ANKLE LIGAMENT  10/12/2004   • TONSILLECTOMY  06/20/1956             PT Ortho     Row Name 11/05/18 0900       Subjective Comments    Subjective Comments Patient state that she is doing well.  No c/o  -CP       Subjective Pain    Able to rate subjective pain? yes  -CP    Pre-Treatment Pain Level 0  -CP       Posture/Observations    Posture/Observations Comments Non antalgic gait  -CP      User Key  (r) = Recorded By, (t) = Taken By, (c) = Cosigned By    Initials Name Provider Type    CP Dilma Briscoe, PTA Physical Therapy Assistant                            PT Assessment/Plan     Row Name 11/05/18 1025          PT Assessment    Assessment Comments Very good tolerance to ther ex.  Patient appears to be progressing well.  good quad control with step up and over   -CP        PT Plan    PT Frequency 2x/week  -CP     Predicted Duration of Therapy Intervention (Therapy Eval) 3 weeks  -CP     PT Plan Comments Cont with POC.  resisted lat stepping on CC.  Possible cybex leg press.  sit to stand independent  -CP       User Key  (r) = Recorded By, (t) = Taken By, (c) = Cosigned By    Initials Name Provider Type    CP Dilma Briscoe PTA Physical Therapy Assistant                Modalities     Row Name 11/05/18 0900             Ice    Ice Applied Yes  -CP      Location left knee  -CP      Rx Minutes --   20 minutes with IFC  -CP      Ice S/P Rx Yes  -CP         ELECTRICAL STIMULATION    Attended/Unattended Unattended  -CP      Stimulation Type IFC  -CP      Location/Electrode Placement/Other ;eft knee  -CP       "52675 - PT Electrical Stimulation Attended (Manual) Minutes 20  -CP        User Key  (r) = Recorded By, (t) = Taken By, (c) = Cosigned By    Initials Name Provider Type    CP Dilma Briscoe PTA Physical Therapy Assistant                Exercises     Row Name 11/05/18 0900             Subjective Comments    Subjective Comments Patient state that she is doing well.  No c/o  -CP         Subjective Pain    Able to rate subjective pain? yes  -CP      Pre-Treatment Pain Level 0  -CP      Post-Treatment Pain Level 0  -CP         Exercise 1    Exercise Name 1 Pro II level 3  -CP      Time 1 10  -CP         Exercise 2    Exercise Name 2 Incline stretch  -CP      Sets 2 3  -CP      Time 2 30  -CP         Exercise 3    Exercise Name 3 Standing HS Stretch  -CP      Sets 3 3  -CP      Time 3 30  -CP         Exercise 4    Exercise Name 4 Step up 6\"  -CP      Sets 4 2  -CP      Reps 4 10  -CP      Additional Comments bilateral  -CP         Exercise 5    Exercise Name 5 Step up and over 4\"  -CP      Sets 5 2  -CP      Reps 5 10  -CP         Exercise 6    Exercise Name 6 lat step up 4\"  -CP      Sets 6 2  -CP      Reps 6 10  -CP         Exercise 7    Exercise Name 7 mini squats on airex  -CP      Sets 7 2  -CP      Reps 7 10  -CP         Exercise 8    Exercise Name 8 Cybex hip AB  -CP      Sets 8 2  -CP      Reps 8 10  -CP      Time 8 30 lb  -CP        User Key  (r) = Recorded By, (t) = Taken By, (c) = Cosigned By    Initials Name Provider Type    CP Dilma Briscoe PTA Physical Therapy Assistant                               PT OP Goals     Row Name 11/05/18 0900          PT Short Term Goals    STG Date to Achieve 11/07/18  -CP     STG 1 Pt will be independent in the HEP  -CP     STG 1 Progress Ongoing  -CP     STG 2 Pt will be able to perform 20 SLR without Ext lag  -CP     STG 2 Progress Met  -CP     STG 3 Pt will ambulate with normal gait pattern  -CP     STG 3 Progress Met   slow gait patern  -CP     STG 4 Pt will have " left knee ROM  0-130.  -CP     STG 4 Progress Met  -CP     STG 5 Pt will have LEFS 60/80  -CP     STG 5 Progress Not Met  -CP        Long Term Goals    LTG Date to Achieve 11/07/18  -CP     LTG 1 PT will have 5/5 MMT quads and hamstrings Left.  -CP     LTG 1 Progress Progressing  -CP        Time Calculation    PT Goal Re-Cert Due Date 11/10/18  -CP       User Key  (r) = Recorded By, (t) = Taken By, (c) = Cosigned By    Initials Name Provider Type    CP Dilma Briscoe, PTA Physical Therapy Assistant                         Time Calculation:   Start Time: 0935  Stop Time: 1040  Time Calculation (min): 65 min  Total Timed Code Minutes- PT: 40 minute(s)  Therapy Suggested Charges     Code   Minutes Charges    10237 (CPT®) Hc Pt Neuromusc Re Education Ea 15 Min      61224 (CPT®) Hc Pt Ther Proc Ea 15 Min      48738 (CPT®) Hc Gait Training Ea 15 Min      59848 (CPT®) Hc Pt Therapeutic Act Ea 15 Min      43397 (CPT®) Hc Pt Manual Therapy Ea 15 Min      11644 (CPT®) Hc Pt Ther Massage- Per 15 Min      47419 (CPT®) Hc Pt Iontophoresis Ea 15 Min      36379 (CPT®) Hc Pt Elec Stim Ea-Per 15 Min 20 1    40258 (CPT®) Hc Pt Ultrasound Ea 15 Min      05359 (CPT®) Hc Pt Self Care/Mgmt/Train Ea 15 Min      03227 (CPT®) Hc Pt Prosthetic (S) Train Initial Encounter, Each 15 Min      80582 (CPT®) Hc Orthotic(S) Mgmt/Train Initial Encounter, Each 15min      27907 (CPT®) Hc Pt Aquatic Therapy Ea 15 Min      16487 (CPT®) Hc Pt Orthotic(S)/Prosthetic(S) Encounter, Each 15 Min       (CPT®) Hc Pt Electrical Stim Unattended      Total  20 1        Therapy Charges for Today     Code Description Service Date Service Provider Modifiers Qty    72836389835 HC PT THER PROC EA 15 MIN 11/5/2018 Dilma Briscoe, PTA GP 3    97890334838 HC PT ELECTRICAL STIM UNATTENDED 11/5/2018 Dilma Briscoe, PTA  1                    Dilma Briscoe PTA  11/5/2018

## 2018-11-07 ENCOUNTER — HOSPITAL ENCOUNTER (OUTPATIENT)
Dept: PHYSICAL THERAPY | Facility: HOSPITAL | Age: 68
Setting detail: THERAPIES SERIES
Discharge: HOME | End: 2018-11-07

## 2018-11-07 DIAGNOSIS — M25.562 ACUTE PAIN OF LEFT KNEE: ICD-10-CM

## 2018-11-07 DIAGNOSIS — M23.92 INTERNAL DERANGEMENT OF KNEE JOINT, LEFT: Primary | ICD-10-CM

## 2018-11-07 PROCEDURE — 97110 THERAPEUTIC EXERCISES: CPT | Performed by: PHYSICAL THERAPIST

## 2018-11-07 PROCEDURE — G8979 MOBILITY GOAL STATUS: HCPCS | Performed by: PHYSICAL THERAPIST

## 2018-11-07 PROCEDURE — G8978 MOBILITY CURRENT STATUS: HCPCS | Performed by: PHYSICAL THERAPIST

## 2018-11-12 ENCOUNTER — HOSPITAL ENCOUNTER (OUTPATIENT)
Dept: PHYSICAL THERAPY | Facility: HOSPITAL | Age: 68
Setting detail: THERAPIES SERIES
Discharge: HOME | End: 2018-11-12

## 2018-11-12 DIAGNOSIS — M25.562 ACUTE PAIN OF LEFT KNEE: ICD-10-CM

## 2018-11-12 DIAGNOSIS — M79.604 RIGHT LEG PAIN: ICD-10-CM

## 2018-11-12 DIAGNOSIS — M23.92 INTERNAL DERANGEMENT OF KNEE JOINT, LEFT: Primary | ICD-10-CM

## 2018-11-12 PROCEDURE — G0283 ELEC STIM OTHER THAN WOUND: HCPCS

## 2018-11-12 PROCEDURE — 97110 THERAPEUTIC EXERCISES: CPT

## 2018-11-12 NOTE — THERAPY TREATMENT NOTE
Outpatient Physical Therapy Ortho Treatment Note  [facility]     Patient Name: Cari Brooks  : 1950  MRN: [id number]  Today's Date: 2018      Visit Date: 2018     Visits   Visits approves Medicare cap  Subjective improvement 75%  Recert date 18  RTMD 18    S/P Left knee arthroscopy 10/15/18     Visit Dx:    ICD-10-CM ICD-9-CM   1. Internal derangement of knee joint, left M23.92 717.9   2. Acute pain of left knee M25.562 719.46   3. Right leg pain M79.604 729.5       Patient Active Problem List   Diagnosis   • Anxiety state   • Asthma   • Essential hypertension   • Vitamin D deficiency   • Osteoarthritis   • GERD without esophagitis   • Internal derangement of knee joint, left   • Left knee pain        Past Medical History:   Diagnosis Date   • Acute maxillary sinusitis    • Acute sinusitis    • Allergic rhinitis    • Anxiety state    • Asthma    • Bronchopneumonia    • Contact dermatitis due to plant    • Cough    • Edema of lower extremity    • Encounter for general adult medical examination without abnormal findings    • Encounter for immunization    • Encounter for routine adult health examination    • Equinus contracture of ankle    • Essential hypertension    • Fever    • GERD (gastroesophageal reflux disease)    • Heel pain    • Knee pain    • Osteoarthritis    • Otalgia    • Pain in limb    • Plantar fasciitis    • PONV (postoperative nausea and vomiting)    • Routine general medical examination at health care facility    • Screening for malignant neoplasm of colon    • Screening mammogram, encounter for    • Synovial cyst of knee    • Vitamin D deficiency         Past Surgical History:   Procedure Laterality Date   • BACK SURGERY  2018   • ENDOSCOPY  2012    Colon endoscopy 43318 (2)    Mild diverticulosis in sigmoid colon. 1 polyp in colon 35 cm from entry & 1 polyp in colon 15 cm from entry; both removed by cold biopsy polypectomy. Hemorrhoids  found.    • HYSTERECTOMY  1986    Hysterectomy w/ oophorectomy (1)     • INJECTION OF MEDICATION  09/03/2015    Kenalog (3)      • KNEE ARTHROSCOPY     • LAPAROSCOPIC CHOLECYSTECTOMY  10/04/2001   • OOPHORECTOMY     • PAP SMEAR  02/12/2009   • REPAIR ANKLE LIGAMENT  10/12/2004   • TONSILLECTOMY  06/20/1956                       PT Assessment/Plan     Row Name 11/12/18 1027          PT Assessment    Assessment Comments  Pt. performed well with all activities today. Presented with slight swelling and sorness on L knee upon arrival. Did not do leg press today secondary to pt. being sore.  -CP        PT Plan    PT Frequency  2x/week  -CP     Predicted Duration of Therapy Intervention (Therapy Eval)  1 week  -CP     PT Plan Comments  Cont. with POC. Monitor pt. edema of L knee and pain. If pt. is still having pain and edema reccomend treatment for one more week with approval from primary PT.  -CP       User Key  (r) = Recorded By, (t) = Taken By, (c) = Cosigned By    Initials Name Provider Type    Dilma Collado PTA Physical Therapy Assistant          Modalities     Row Name 11/12/18 0900             Ice    Ice Applied  Yes  -CP      Location  left knee  -CP      Ice S/P Rx  Yes  -CP         ELECTRICAL STIMULATION    Attended/Unattended  Unattended  -CP      Stimulation Type  IFC  -CP      Location/Electrode Placement/Other  Left knee  -CP      16120 - PT Electrical Stimulation Attended (Manual) Minutes  20  -CP        User Key  (r) = Recorded By, (t) = Taken By, (c) = Cosigned By    Initials Name Provider Type    Dilma Collado PTA Physical Therapy Assistant          Exercises     Row Name 11/12/18 0900             Subjective Pain    Able to rate subjective pain?  yes  -CP      Pre-Treatment Pain Level  3  -CP      Post-Treatment Pain Level  0  -CP      Subjective Pain Comment  numb after ice  -CP         Exercise 1    Exercise Name 1  Pro II level 4  -CP      Time 1  10  -CP         Exercise 2    Exercise  "Name 2  incline stretch  -CP      Sets 2  3  -CP      Time 2  30  -CP         Exercise 3    Exercise Name 3  standing HS stretch  -CP      Sets 3  3  -CP      Time 3  30  -CP         Exercise 4    Exercise Name 4  Mini squats on foam  -CP      Sets 4  2  -CP      Reps 4  10  -CP         Exercise 5    Exercise Name 5  step ups 6\"  -CP      Sets 5  2  -CP      Reps 5  10  -CP         Exercise 6    Exercise Name 6  step up and over  -CP      Sets 6  2  -CP      Reps 6  10  -CP         Exercise 7    Exercise Name 7  MHip ext  -CP      Reps 7  20  -CP      Additional Comments  2pl  -CP         Exercise 8    Exercise Name 8  MHip flexion  -CP      Reps 8  20  -CP      Additional Comments  2pl  -CP         Exercise 9    Exercise Name 9  MHip abd  -CP      Reps 9  20  -CP      Additional Comments  2pl  -CP        User Key  (r) = Recorded By, (t) = Taken By, (c) = Cosigned By    Initials Name Provider Type    CP Dilma Briscoe, PTA Physical Therapy Assistant                         PT OP Goals     Row Name 11/12/18 1000          PT Short Term Goals    STG Date to Achieve  11/07/18  -CP     STG 1  Pt will be independent in the HEP  -CP     STG 1 Progress  Met  -CP     STG 2  Pt will be able to perform 20 SLR without Ext lag  -CP     STG 2 Progress  Met  -CP     STG 3  Pt will ambulate with normal gait pattern  -CP     STG 3 Progress  Met slow gait patern  -CP     STG 4  Pt will have left knee ROM  0-130.  -CP     STG 4 Progress  Met  -CP     STG 5  Pt will have LEFS 60/80  -CP     STG 5 Progress  Met  -CP        Long Term Goals    LTG Date to Achieve  11/07/18  -CP     LTG 1  PT will have 5/5 MMT quads and hamstrings Left.  -CP     LTG 1 Progress  Met  -CP        Time Calculation    PT Goal Re-Cert Due Date  11/28/18  -CP       User Key  (r) = Recorded By, (t) = Taken By, (c) = Cosigned By    Initials Name Provider Type    Dilma Collado, REA Physical Therapy Assistant                         Time Calculation: "   Start Time: 0935  Stop Time: 1040  Time Calculation (min): 65 min  Total Timed Code Minutes- PT: 45 minute(s)  Therapy Suggested Charges     Code   Minutes Charges    77286 (CPT®) Hc Pt Neuromusc Re Education Ea 15 Min      17677 (CPT®) Hc Pt Ther Proc Ea 15 Min      18768 (CPT®) Hc Gait Training Ea 15 Min      94773 (CPT®) Hc Pt Therapeutic Act Ea 15 Min      03339 (CPT®) Hc Pt Manual Therapy Ea 15 Min      31526 (CPT®) Hc Pt Ther Massage- Per 15 Min      89003 (CPT®) Hc Pt Iontophoresis Ea 15 Min      39020 (CPT®) Hc Pt Elec Stim Ea-Per 15 Min 20 1    54046 (CPT®) Hc Pt Ultrasound Ea 15 Min      54940 (CPT®) Hc Pt Self Care/Mgmt/Train Ea 15 Min      11215 (CPT®) Hc Pt Prosthetic (S) Train Initial Encounter, Each 15 Min      51024 (CPT®) Hc Orthotic(S) Mgmt/Train Initial Encounter, Each 15min      66271 (CPT®) Hc Pt Aquatic Therapy Ea 15 Min      30367 (CPT®) Hc Pt Orthotic(S)/Prosthetic(S) Encounter, Each 15 Min       (CPT®) Hc Pt Electrical Stim Unattended      Total  20 1        Therapy Charges for Today     Code Description Service Date Service Provider Modifiers Qty    68440693069 HC PT ELECTRICAL STIM UNATTENDED 11/12/2018 Dilma Briscoe, PTA  1    61254330049 HC PT THER PROC EA 15 MIN 11/12/2018 Dilma Briscoe, PTA GP 3                    Dilma Briscoe PTA  11/12/2018

## 2018-11-14 ENCOUNTER — HOSPITAL ENCOUNTER (OUTPATIENT)
Dept: PHYSICAL THERAPY | Facility: HOSPITAL | Age: 68
Setting detail: THERAPIES SERIES
Discharge: HOME | End: 2018-11-14

## 2018-11-14 DIAGNOSIS — M23.92 INTERNAL DERANGEMENT OF KNEE JOINT, LEFT: Primary | ICD-10-CM

## 2018-11-14 DIAGNOSIS — M25.562 ACUTE PAIN OF LEFT KNEE: ICD-10-CM

## 2018-11-14 PROCEDURE — 97110 THERAPEUTIC EXERCISES: CPT

## 2018-11-14 PROCEDURE — G0283 ELEC STIM OTHER THAN WOUND: HCPCS

## 2018-11-14 NOTE — THERAPY TREATMENT NOTE
Outpatient Physical Therapy Ortho Treatment Note  [facility]     Patient Name: Cari Brooks  : 1950  MRN: [id number]  Today's Date: 2018      Visit Date: 2018     Subjective Improvement Better today  Visits   Visits approved medicare cap  RTMD 2018  Recert Date 2018    S/P Left knee arthroscopy on 10-    Visit Dx:    ICD-10-CM ICD-9-CM   1. Internal derangement of knee joint, left M23.92 717.9   2. Acute pain of left knee M25.562 719.46       Patient Active Problem List   Diagnosis   • Anxiety state   • Asthma   • Essential hypertension   • Vitamin D deficiency   • Osteoarthritis   • GERD without esophagitis   • Internal derangement of knee joint, left   • Left knee pain        Past Medical History:   Diagnosis Date   • Acute maxillary sinusitis    • Acute sinusitis    • Allergic rhinitis    • Anxiety state    • Asthma    • Bronchopneumonia    • Contact dermatitis due to plant    • Cough    • Edema of lower extremity    • Encounter for general adult medical examination without abnormal findings    • Encounter for immunization    • Encounter for routine adult health examination    • Equinus contracture of ankle    • Essential hypertension    • Fever    • GERD (gastroesophageal reflux disease)    • Heel pain    • Knee pain    • Osteoarthritis    • Otalgia    • Pain in limb    • Plantar fasciitis    • PONV (postoperative nausea and vomiting)    • Routine general medical examination at health care facility    • Screening for malignant neoplasm of colon    • Screening mammogram, encounter for    • Synovial cyst of knee    • Vitamin D deficiency         Past Surgical History:   Procedure Laterality Date   • BACK SURGERY  2018   • ENDOSCOPY  2012    Colon endoscopy 97326 (2)    Mild diverticulosis in sigmoid colon. 1 polyp in colon 35 cm from entry & 1 polyp in colon 15 cm from entry; both removed by cold biopsy polypectomy. Hemorrhoids found.    •  HYSTERECTOMY  1986    Hysterectomy w/ oophorectomy (1)     • INJECTION OF MEDICATION  09/03/2015    Kenalog (3)      • KNEE ARTHROSCOPY     • LAPAROSCOPIC CHOLECYSTECTOMY  10/04/2001   • OOPHORECTOMY     • PAP SMEAR  02/12/2009   • REPAIR ANKLE LIGAMENT  10/12/2004   • TONSILLECTOMY  06/20/1956                       PT Assessment/Plan     Row Name 11/14/18 1110          PT Assessment    Assessment Comments  patient had good control with step up and over and step down.  edema decreased in left knee  -CP        PT Plan    PT Frequency  2x/week  -CP     Predicted Duration of Therapy Intervention (Therapy Eval)  1 week  -CP     PT Plan Comments  Cont with POC.  Finalize HEP and fitness program next visit.  Patient is scheduled in 2 weeks. for follow up  -CP       User Key  (r) = Recorded By, (t) = Taken By, (c) = Cosigned By    Initials Name Provider Type    CP Dilma Briscoe, PTA Physical Therapy Assistant          Modalities     Row Name 11/14/18 1100             Ice    Ice Applied  Yes  -CP      Location  left knee  -CP      Rx Minutes  -- 20 minutes with IFC  -CP      Ice S/P Rx  Yes  -CP         ELECTRICAL STIMULATION    Attended/Unattended  Unattended  -CP      Stimulation Type  IFC  -CP      Location/Electrode Placement/Other  left knee  -CP      20948 - PT Electrical Stimulation Attended (Manual) Minutes  20  -CP        User Key  (r) = Recorded By, (t) = Taken By, (c) = Cosigned By    Initials Name Provider Type    CP Dilma Briscoe, PTA Physical Therapy Assistant          Exercises     Row Name 11/14/18 1100             Subjective Comments    Subjective Comments  States that knee is slowly getting better.   -CP         Subjective Pain    Able to rate subjective pain?  yes  -CP      Pre-Treatment Pain Level  2  -CP      Post-Treatment Pain Level  0  -CP         Exercise 1    Exercise Name 1  Pro II level 4  -CP      Time 1  10  -CP         Exercise 2    Exercise Name 2  incline stretch  -CP      Sets 2  3   "-CP      Time 2  30  -CP         Exercise 3    Exercise Name 3  Standing HS Stretch  -CP      Sets 3  3  -CP      Time 3  30  -CP         Exercise 4    Exercise Name 4  Step up 6\"  -CP      Sets 4  2  -CP      Reps 4  10  -CP         Exercise 5    Exercise Name 5  step up and over 4\"  -CP      Sets 5  2  -CP      Reps 5  10  -CP         Exercise 6    Exercise Name 6  step down 4\"  -CP      Sets 6  2  -CP      Reps 6  10  -CP         Exercise 7    Exercise Name 7  resisted walks on CC  -CP      Reps 7  5  -CP      Time 7  4 ways  -CP         Exercise 8    Exercise Name 8  Multi hip fl, AB and ext  -CP      Reps 8  20  -CP      Additional Comments  2 plates  -CP        User Key  (r) = Recorded By, (t) = Taken By, (c) = Cosigned By    Initials Name Provider Type    CP Dilma Briscoe, PTA Physical Therapy Assistant                         PT OP Goals     Row Name 11/14/18 1100          PT Short Term Goals    STG Date to Achieve  11/07/18  -CP     STG 1  Pt will be independent in the HEP  -CP     STG 1 Progress  Met  -CP     STG 2  Pt will be able to perform 20 SLR without Ext lag  -CP     STG 2 Progress  Met  -CP     STG 3  Pt will ambulate with normal gait pattern  -CP     STG 3 Progress  Met slow gait patern  -CP     STG 4  Pt will have left knee ROM  0-130.  -CP     STG 4 Progress  Met  -CP     STG 5  Pt will have LEFS 60/80  -CP     STG 5 Progress  Met  -CP        Long Term Goals    LTG Date to Achieve  11/07/18  -CP     LTG 1  PT will have 5/5 MMT quads and hamstrings Left.  -CP     LTG 1 Progress  Met  -CP        Time Calculation    PT Goal Re-Cert Due Date  11/28/18  -CP       User Key  (r) = Recorded By, (t) = Taken By, (c) = Cosigned By    Initials Name Provider Type    Dilma Collado, PTA Physical Therapy Assistant                         Time Calculation:   Start Time: 0935  Stop Time: 1040  Time Calculation (min): 65 min  Total Timed Code Minutes- PT: 45 minute(s)  Therapy Suggested Charges     Code "   Minutes Charges    63133 (CPT®) Hc Pt Neuromusc Re Education Ea 15 Min      96505 (CPT®) Hc Pt Ther Proc Ea 15 Min      05299 (CPT®) Hc Gait Training Ea 15 Min      06956 (CPT®) Hc Pt Therapeutic Act Ea 15 Min      23664 (CPT®) Hc Pt Manual Therapy Ea 15 Min      49264 (CPT®) Hc Pt Ther Massage- Per 15 Min      15722 (CPT®) Hc Pt Iontophoresis Ea 15 Min      13678 (CPT®) Hc Pt Elec Stim Ea-Per 15 Min 20 1    49869 (CPT®) Hc Pt Ultrasound Ea 15 Min      48344 (CPT®) Hc Pt Self Care/Mgmt/Train Ea 15 Min      36758 (CPT®) Hc Pt Prosthetic (S) Train Initial Encounter, Each 15 Min      50522 (CPT®) Hc Orthotic(S) Mgmt/Train Initial Encounter, Each 15min      31706 (CPT®) Hc Pt Aquatic Therapy Ea 15 Min      55060 (CPT®) Hc Pt Orthotic(S)/Prosthetic(S) Encounter, Each 15 Min       (CPT®) Hc Pt Electrical Stim Unattended      Total  20 1        Therapy Charges for Today     Code Description Service Date Service Provider Modifiers Qty    41846971023 HC PT THER PROC EA 15 MIN 11/14/2018 Dilma Briscoe, PTA GP 3    13006709695 HC PT ELECTRICAL STIM UNATTENDED 11/14/2018 Dilma Briscoe, PTA  1    37624952882 HC PT THER SUPP EA 15 MIN 11/14/2018 Dilma Briscoe, PTA GP 1                    Dilma Briscoe PTA  11/14/2018

## 2018-11-26 ENCOUNTER — HOSPITAL ENCOUNTER (OUTPATIENT)
Dept: PHYSICAL THERAPY | Facility: HOSPITAL | Age: 68
Setting detail: THERAPIES SERIES
Discharge: HOME | End: 2018-11-26

## 2018-11-26 DIAGNOSIS — M25.562 ACUTE PAIN OF LEFT KNEE: ICD-10-CM

## 2018-11-26 DIAGNOSIS — M23.92 INTERNAL DERANGEMENT OF KNEE JOINT, LEFT: Primary | ICD-10-CM

## 2018-11-26 PROCEDURE — 97110 THERAPEUTIC EXERCISES: CPT

## 2018-11-26 NOTE — THERAPY DISCHARGE NOTE
Outpatient Physical Therapy Ortho Treatment Note/Discharge Summary  [facility]     Patient Name: Cari Brooks  : 1950  MRN: [id number]  Today's Date: 2018      Visit Date: 2018     Subjective Improvement 80%  Visits 10/10  Visits approved medicare cap  RTMD 2018  Recert Date 2018    S/P left knee arthroscopy on 10-    Visit Dx:    ICD-10-CM ICD-9-CM   1. Internal derangement of knee joint, left M23.92 717.9   2. Acute pain of left knee M25.562 719.46       Patient Active Problem List   Diagnosis   • Anxiety state   • Asthma   • Essential hypertension   • Vitamin D deficiency   • Osteoarthritis   • GERD without esophagitis   • Internal derangement of knee joint, left   • Left knee pain        Past Medical History:   Diagnosis Date   • Acute maxillary sinusitis    • Acute sinusitis    • Allergic rhinitis    • Anxiety state    • Asthma    • Bronchopneumonia    • Contact dermatitis due to plant    • Cough    • Edema of lower extremity    • Encounter for general adult medical examination without abnormal findings    • Encounter for immunization    • Encounter for routine adult health examination    • Equinus contracture of ankle    • Essential hypertension    • Fever    • GERD (gastroesophageal reflux disease)    • Heel pain    • Knee pain    • Osteoarthritis    • Otalgia    • Pain in limb    • Plantar fasciitis    • PONV (postoperative nausea and vomiting)    • Routine general medical examination at health care facility    • Screening for malignant neoplasm of colon    • Screening mammogram, encounter for    • Synovial cyst of knee    • Vitamin D deficiency         Past Surgical History:   Procedure Laterality Date   • BACK SURGERY  2018   • ENDOSCOPY  2012    Colon endoscopy 10515 (2)    Mild diverticulosis in sigmoid colon. 1 polyp in colon 35 cm from entry & 1 polyp in colon 15 cm from entry; both removed by cold biopsy polypectomy. Hemorrhoids found.   "  • HYSTERECTOMY  1986    Hysterectomy w/ oophorectomy (1)     • INJECTION OF MEDICATION  09/03/2015    Kenalog (3)      • KNEE ARTHROSCOPY     • LAPAROSCOPIC CHOLECYSTECTOMY  10/04/2001   • OOPHORECTOMY     • PAP SMEAR  02/12/2009   • REPAIR ANKLE LIGAMENT  10/12/2004   • TONSILLECTOMY  06/20/1956                       PT Assessment/Plan     Row Name 11/26/18 1021          PT Assessment    Assessment Comments  Pt performed all ther ex well today. Had no c/o increased discomfort during therapy.  Patient has meet all goals.    -CP        PT Plan    PT Plan Comments  D/C with HEP and one free month fitness membership  -CP       User Key  (r) = Recorded By, (t) = Taken By, (c) = Cosigned By    Initials Name Provider Type    Dilma Collado, PTA Physical Therapy Assistant              Exercises     Row Name 11/26/18 1000             Subjective Comments    Subjective Comments  Pt states that she was standing alot over the weekend and her knee was swollen and sore but is now better.  -CP         Subjective Pain    Able to rate subjective pain?  yes  -CP      Pre-Treatment Pain Level  1  -CP      Post-Treatment Pain Level  1  -CP         Exercise 1    Exercise Name 1  Pro II level 4  -CP      Time 1  10  -CP         Exercise 2    Exercise Name 2  incline stretch  -CP      Sets 2  3  -CP      Time 2  30 sec  -CP         Exercise 3    Exercise Name 3  standing HS stretch  -CP      Sets 3  3  -CP      Time 3  30 sec  -CP         Exercise 4    Exercise Name 4  Lunge stretch on stairs  -CP      Sets 4  3  -CP      Time 4  30 sec  -CP         Exercise 5    Exercise Name 5  step ups 6\"  -CP      Sets 5  3  -CP      Reps 5  10  -CP         Exercise 6    Exercise Name 6  step down 6\"  -CP      Sets 6  3  -CP      Reps 6  10  -CP         Exercise 7    Exercise Name 7  cybex hip abd  -CP      Sets 7  3  -CP      Reps 7  10  -CP      Additional Comments  50lbs  -CP         Exercise 8    Exercise Name 8  cybex hip add  -CP      " Sets 8  3  -CP      Reps 8  10  -CP      Additional Comments  50lbs  -CP        User Key  (r) = Recorded By, (t) = Taken By, (c) = Cosigned By    Initials Name Provider Type    CP Dilma Briscoe, REA Physical Therapy Assistant                         PT OP Goals     Row Name 11/26/18 1000          PT Short Term Goals    STG Date to Achieve  11/07/18  -CP     STG 1  Pt will be independent in the HEP  -CP     STG 1 Progress  Met  -CP     STG 2  Pt will be able to perform 20 SLR without Ext lag  -CP     STG 2 Progress  Met  -CP     STG 3  Pt will ambulate with normal gait pattern  -CP     STG 3 Progress  Met slow gait patern  -CP     STG 4  Pt will have left knee ROM  0-130.  -CP     STG 4 Progress  Met  -CP     STG 5  Pt will have LEFS 60/80  -CP     STG 5 Progress  Met  -CP        Long Term Goals    LTG Date to Achieve  11/07/18  -CP     LTG 1  PT will have 5/5 MMT quads and hamstrings Left.  -CP     LTG 1 Progress  Met  -CP        Time Calculation    PT Goal Re-Cert Due Date  11/28/18  -CP       User Key  (r) = Recorded By, (t) = Taken By, (c) = Cosigned By    Initials Name Provider Type    CP Dilma Briscoe PTA Physical Therapy Assistant                         Time Calculation:   Start Time: 0935  Stop Time: 1015  Time Calculation (min): 40 min  Total Timed Code Minutes- PT: 40 minute(s)  Therapy Suggested Charges     Code   Minutes Charges    None           Therapy Charges for Today     Code Description Service Date Service Provider Modifiers Qty    32041070243 HC PT THER PROC EA 15 MIN 11/26/2018 Dilma Briscoe PTA GP 3                OP PT Discharge Summary  Date of Discharge: 11/26/18  Reason for Discharge: All goals achieved  Outcomes Achieved: Able to achieve all goals within established timeline  Discharge Destination: Home with home program(one free fitness membership)      Dilma Briscoe PTA  11/26/2018

## 2018-12-05 ENCOUNTER — TELEPHONE (OUTPATIENT)
Dept: FAMILY MEDICINE CLINIC | Facility: CLINIC | Age: 68
End: 2018-12-05

## 2018-12-14 ENCOUNTER — OFFICE VISIT (OUTPATIENT)
Dept: FAMILY MEDICINE CLINIC | Facility: CLINIC | Age: 68
End: 2018-12-14

## 2018-12-14 VITALS
HEART RATE: 77 BPM | HEIGHT: 67 IN | OXYGEN SATURATION: 98 % | BODY MASS INDEX: 32.51 KG/M2 | WEIGHT: 207.1 LBS | DIASTOLIC BLOOD PRESSURE: 78 MMHG | SYSTOLIC BLOOD PRESSURE: 128 MMHG

## 2018-12-14 DIAGNOSIS — I10 ESSENTIAL HYPERTENSION: Chronic | ICD-10-CM

## 2018-12-14 DIAGNOSIS — Z00.00 MEDICARE ANNUAL WELLNESS VISIT, SUBSEQUENT: Primary | ICD-10-CM

## 2018-12-14 PROCEDURE — G0439 PPPS, SUBSEQ VISIT: HCPCS | Performed by: GENERAL PRACTICE

## 2018-12-14 PROCEDURE — 99212 OFFICE O/P EST SF 10 MIN: CPT | Performed by: GENERAL PRACTICE

## 2018-12-14 RX ORDER — HYDROXYZINE HYDROCHLORIDE 25 MG/1
25 TABLET, FILM COATED ORAL 3 TIMES DAILY PRN
Qty: 30 TABLET | Refills: 1 | Status: SHIPPED | OUTPATIENT
Start: 2018-12-14 | End: 2019-07-22 | Stop reason: SDUPTHER

## 2018-12-14 NOTE — PROGRESS NOTES
QUICK REFERENCE INFORMATION:  The ABCs of the Annual Wellness Visit    Subsequent Medicare Wellness Visit    HEALTH RISK ASSESSMENT    1950    Recent Hospitalizations:  Recently treated at the following:  Other: [facility].        Current Medical Providers:  Patient Care Team:  Peyton Stockton MD as PCP - General  Peyton Stockton MD as PCP - Claims Attributed  Jorje Delgado OD as Consulting Physician (Optometry)  Nathen Szymanski DMD as Consulting Physician (Dental General Practice)        Smoking Status:  Social History     Tobacco Use   Smoking Status Never Smoker   Smokeless Tobacco Never Used       Alcohol Consumption:  Social History     Substance and Sexual Activity   Alcohol Use No       Depression Screen:   PHQ-2/PHQ-9 Depression Screening 12/14/2018   Little interest or pleasure in doing things 0   Feeling down, depressed, or hopeless 0   Trouble falling or staying asleep, or sleeping too much 1   Feeling tired or having little energy 0   Poor appetite or overeating 0   Feeling bad about yourself - or that you are a failure or have let yourself or your family down 0   Trouble concentrating on things, such as reading the newspaper or watching television 0   Moving or speaking so slowly that other people could have noticed. Or the opposite - being so fidgety or restless that you have been moving around a lot more than usual 0   Thoughts that you would be better off dead, or of hurting yourself in some way 0   Total Score 1   If you checked off any problems, how difficult have these problems made it for you to do your work, take care of things at home, or get along with other people? Not difficult at all       Health Habits and Functional and Cognitive Screening:  Functional & Cognitive Status 12/14/2018   Do you have difficulty preparing food and eating? No   Do you have difficulty bathing yourself, getting dressed or grooming yourself? No   Do you have difficulty using the toilet? No    Do you have difficulty moving around from place to place? No   Do you have trouble with steps or getting out of a bed or a chair? No   In the past year have you fallen or experienced a near fall? No   Current Diet Well Balanced Diet   Dental Exam Not up to date   Eye Exam Not up to date   Exercise (times per week) 2 times per week   Do you need help using the phone?  No   Are you deaf or do you have serious difficulty hearing?  No   Do you need help with transportation? No   Do you need help shopping? No   Do you need help preparing meals?  No   Do you need help with housework?  No   Do you need help with laundry? No   Do you need help taking your medications? No   Do you need help managing money? No   Do you ever drive or ride in a car without wearing a seat belt? No   Have you felt unusual stress, anger or loneliness in the last month? No   Who do you live with? Spouse   If you need help, do you have trouble finding someone available to you? No   Have you been bothered in the last four weeks by sexual problems? No   Do you have difficulty concentrating, remembering or making decisions? No           Does the patient have evidence of cognitive impairment? No    Aspirin use counseling: Does not need ASA (and currently is not on it)      Recent Lab Results:  CMP:  Lab Results   Component Value Date    BUN 20 10/23/2018    CREATININE 1.01 (H) 10/23/2018    EGFRIFNONA 55 10/23/2018    BCR 19.8 10/23/2018     (L) 10/23/2018    K 3.3 (L) 10/23/2018    CO2 27.0 10/23/2018    CALCIUM 10.0 10/23/2018    ALBUMIN 4.50 10/23/2018    BILITOT 0.5 10/23/2018    ALKPHOS 109 10/23/2018    AST 30 10/23/2018    ALT 46 10/23/2018     Lipid Panel:  Lab Results   Component Value Date    CHOL 197 07/16/2018    TRIG 151 07/16/2018    HDL 50 (L) 07/16/2018    LDLHDL 2.34 07/16/2018     HbA1c:       Visual Acuity:  No exam data present    Age-appropriate Screening Schedule:  Refer to the list below for future screening  recommendations based on patient's age, sex and/or medical conditions. Orders for these recommended tests are listed in the plan section. The patient has been provided with a written plan.    Health Maintenance   Topic Date Due   • ZOSTER VACCINE (2 of 2) 08/08/2017   • PNEUMOCOCCAL VACCINES (65+ LOW/MEDIUM RISK) (2 of 2 - PPSV23) 01/09/2019   • MAMMOGRAM  07/18/2020   • COLONOSCOPY  11/13/2022   • TDAP/TD VACCINES (2 - Td) 06/13/2027   • INFLUENZA VACCINE  Completed        Subjective   History of Present Illness    Cari Brooks is a 68 y.o. female who presents for an Subsequent Wellness Visit.    The following portions of the patient's history were reviewed and updated as appropriate: allergies, current medications, past family history, past medical history, past social history, past surgical history and problem list.    Outpatient Medications Prior to Visit   Medication Sig Dispense Refill   • amLODIPine (NORVASC) 10 MG tablet Take 1 tablet by mouth Daily. for blood pressure 90 tablet 3   • Cholecalciferol (D3-1000) 1000 UNITS capsule Take 1,000 Units by mouth Daily. cholecalciferol (vitamin D3) 1,000 unit capsule     • losartan-hydrochlorothiazide (HYZAAR) 100-25 MG per tablet Take 1 tablet by mouth Daily. for blood pressure 90 tablet 3   • omeprazole (priLOSEC) 20 MG capsule Take 20 mg by mouth Daily As Needed.     • potassium chloride (MICRO-K) 8 MEQ CR capsule Take 1 capsule by mouth 2 (Two) Times a Day. 180 capsule 3   • pseudoephedrine (SUDAFED) 30 MG tablet Take 1 tablet by mouth 2 (Two) Times a Day As Needed for Congestion. 30 tablet 0   • hydrOXYzine (ATARAX) 25 MG tablet Take 25 mg by mouth 3 (Three) Times a Day As Needed.       No facility-administered medications prior to visit.        Patient Active Problem List   Diagnosis   • Anxiety state   • Asthma   • Essential hypertension   • Vitamin D deficiency   • Osteoarthritis   • GERD without esophagitis   • Internal derangement of knee joint, left  "  • Left knee pain       Advance Care Planning:  has NO advance directive - information provided to the patient today    Identification of Risk Factors:  Risk factors include: weight , unhealthy diet, inactivity and increased fall risk.    Review of Systems    Compared to one year ago, the patient feels her physical health is the same.  Compared to one year ago, the patient feels her mental health is the same.    Objective     Physical Exam   Constitutional: She is oriented to person, place, and time. She appears well-developed and well-nourished. No distress.   HENT:   Head: Normocephalic and atraumatic.   Nose: Nose normal.   Mouth/Throat: Oropharynx is clear and moist.   Eyes: Conjunctivae and EOM are normal. Pupils are equal, round, and reactive to light. Right eye exhibits no discharge. Left eye exhibits no discharge.   Neck: No thyromegaly present.   Cardiovascular: Normal rate, regular rhythm, normal heart sounds and intact distal pulses.   Pulmonary/Chest: Effort normal and breath sounds normal.   Lymphadenopathy:     She has no cervical adenopathy.   Neurological: She is alert and oriented to person, place, and time.   Skin: Skin is warm and dry.   Psychiatric: She has a normal mood and affect.   Nursing note and vitals reviewed.      Vitals:    12/14/18 0933   BP: 128/78   BP Location: Left arm   Patient Position: Sitting   Cuff Size: Adult   Pulse: 77   SpO2: 98%   Weight: 93.9 kg (207 lb 1.6 oz)   Height: 170.2 cm (67\")   PainSc: 0-No pain       Patient's Body mass index is 32.44 kg/m². BMI is above normal parameters. Recommendations include: exercise counseling and nutrition counseling.      Assessment/Plan   Patient Self-Management and Personalized Health Advice  The patient has been provided with information about: diet, exercise, weight management and fall prevention and preventive services including:   · Advance directive, Diabetes screening, see lab orders, Exercise counseling provided, Fall Risk " assessment done, Influenza vaccine, Pneumococcal vaccine , Zostavax vaccine (Herpes Zoster).    Visit Diagnoses:    ICD-10-CM ICD-9-CM   1. Medicare annual wellness visit, subsequent Z00.00 V70.0       No orders of the defined types were placed in this encounter.      Outpatient Encounter Medications as of 12/14/2018   Medication Sig Dispense Refill   • amLODIPine (NORVASC) 10 MG tablet Take 1 tablet by mouth Daily. for blood pressure 90 tablet 3   • Cholecalciferol (D3-1000) 1000 UNITS capsule Take 1,000 Units by mouth Daily. cholecalciferol (vitamin D3) 1,000 unit capsule     • hydrOXYzine (ATARAX) 25 MG tablet Take 1 tablet by mouth 3 (Three) Times a Day As Needed for Anxiety. 30 tablet 1   • losartan-hydrochlorothiazide (HYZAAR) 100-25 MG per tablet Take 1 tablet by mouth Daily. for blood pressure 90 tablet 3   • omeprazole (priLOSEC) 20 MG capsule Take 20 mg by mouth Daily As Needed.     • potassium chloride (MICRO-K) 8 MEQ CR capsule Take 1 capsule by mouth 2 (Two) Times a Day. 180 capsule 3   • pseudoephedrine (SUDAFED) 30 MG tablet Take 1 tablet by mouth 2 (Two) Times a Day As Needed for Congestion. 30 tablet 0   • [DISCONTINUED] hydrOXYzine (ATARAX) 25 MG tablet Take 25 mg by mouth 3 (Three) Times a Day As Needed.       No facility-administered encounter medications on file as of 12/14/2018.        Reviewed use of high risk medication in the elderly: yes  Reviewed for potential of harmful drug interactions in the elderly: not applicable    Follow Up:  Return in about 7 months (around 7/19/2019) for Annual physical.     An After Visit Summary and PPPS with all of these plans were given to the patient.    Information has been scanned into chart.  Discussed importance of taking medications as prescribed. Encouraged healthy eating habits with low fat, low salt choices and working towards maintaining a healthy weight. Recommended regular exercise if able as well as care to prevent falls.

## 2018-12-14 NOTE — PROGRESS NOTES
Subjective   Cari Brooks is a 68 y.o. female.   Chief Complaint   Patient presents with   • Follow-up     mwv   • Hypertension     For review and evaluation of management of chronic medical problems.   Hypertension   This is a chronic problem. The current episode started more than 1 year ago. The problem is unchanged. The problem is controlled. Pertinent negatives include no chest pain, headaches, neck pain, palpitations or shortness of breath. There are no associated agents to hypertension. Current antihypertension treatment includes angiotensin blockers, diuretics and calcium channel blockers. The current treatment provides significant improvement. There are no compliance problems.       The following portions of the patient's history were reviewed and updated as appropriate: allergies, current medications, past social history and problem list.    Outpatient Medications Prior to Visit   Medication Sig Dispense Refill   • amLODIPine (NORVASC) 10 MG tablet Take 1 tablet by mouth Daily. for blood pressure 90 tablet 3   • Cholecalciferol (D3-1000) 1000 UNITS capsule Take 1,000 Units by mouth Daily. cholecalciferol (vitamin D3) 1,000 unit capsule     • losartan-hydrochlorothiazide (HYZAAR) 100-25 MG per tablet Take 1 tablet by mouth Daily. for blood pressure 90 tablet 3   • omeprazole (priLOSEC) 20 MG capsule Take 20 mg by mouth Daily As Needed.     • potassium chloride (MICRO-K) 8 MEQ CR capsule Take 1 capsule by mouth 2 (Two) Times a Day. 180 capsule 3   • pseudoephedrine (SUDAFED) 30 MG tablet Take 1 tablet by mouth 2 (Two) Times a Day As Needed for Congestion. 30 tablet 0   • hydrOXYzine (ATARAX) 25 MG tablet Take 25 mg by mouth 3 (Three) Times a Day As Needed.       No facility-administered medications prior to visit.        Review of Systems   Constitutional: Negative.  Negative for chills, fatigue, fever and unexpected weight change.   HENT: Negative.  Negative for congestion, ear pain, hearing loss,  "nosebleeds, rhinorrhea, sneezing, sore throat and tinnitus.    Eyes: Negative.  Negative for discharge.   Respiratory: Negative.  Negative for cough, shortness of breath and wheezing.    Cardiovascular: Negative.  Negative for chest pain and palpitations.   Gastrointestinal: Negative.  Negative for abdominal pain, constipation, diarrhea, nausea and vomiting.   Endocrine: Negative.    Genitourinary: Negative.  Negative for dysuria, frequency and urgency.   Musculoskeletal: Negative.  Negative for arthralgias, back pain, joint swelling, myalgias and neck pain.   Skin: Negative.  Negative for rash.   Allergic/Immunologic: Negative.    Neurological: Negative.  Negative for dizziness, weakness, numbness and headaches.   Hematological: Negative.  Negative for adenopathy.   Psychiatric/Behavioral: Negative.  Negative for dysphoric mood and sleep disturbance. The patient is not nervous/anxious.        Objective   Visit Vitals  /78 (BP Location: Left arm, Patient Position: Sitting, Cuff Size: Adult)   Pulse 77   Ht 170.2 cm (67\")   Wt 93.9 kg (207 lb 1.6 oz)   LMP  (LMP Unknown)   SpO2 98%   BMI 32.44 kg/m²     Physical Exam   Constitutional: She is oriented to person, place, and time. She appears well-developed and well-nourished. No distress.   HENT:   Head: Normocephalic and atraumatic.   Nose: Nose normal.   Mouth/Throat: Oropharynx is clear and moist.   Eyes: Conjunctivae and EOM are normal. Pupils are equal, round, and reactive to light. Right eye exhibits no discharge. Left eye exhibits no discharge.   Neck: No thyromegaly present.   Cardiovascular: Normal rate, regular rhythm, normal heart sounds and intact distal pulses.   Pulmonary/Chest: Effort normal and breath sounds normal.   Lymphadenopathy:     She has no cervical adenopathy.   Neurological: She is alert and oriented to person, place, and time.   Skin: Skin is warm and dry.   Psychiatric: She has a normal mood and affect.   Nursing note and vitals " reviewed.      Assessment/Plan   Problem List Items Addressed This Visit        Cardiovascular and Mediastinum    Essential hypertension (Chronic)    Relevant Orders    Comprehensive Metabolic Panel    Lipid Panel    Urinalysis With Culture If Indicated - Urine, Clean Catch      Other Visit Diagnoses     Medicare annual wellness visit, subsequent    -  Primary          Continue current treatment.     New Medications Ordered This Visit   Medications   • hydrOXYzine (ATARAX) 25 MG tablet     Sig: Take 1 tablet by mouth 3 (Three) Times a Day As Needed for Anxiety.     Dispense:  30 tablet     Refill:  1     Return in about 7 months (around 7/19/2019) for Annual physical.

## 2018-12-14 NOTE — PATIENT INSTRUCTIONS
Check at pharmacy on Shingrix shingles vaccine    Medicare Wellness  Personal Prevention Plan of Service     Date of Office Visit:  2018  Encounter Provider:  Peyton Stockton MD  Place of Service:  [facility]  Patient Name: Cari Brooks  :  1950    As part of the Medicare Wellness portion of your visit today, we are providing you with this personalized preventive plan of services (PPPS). This plan is based upon recommendations of the [place] Preventive Services Task Force (USPSTF) and the Advisory Committee on Immunization Practices (ACIP).    This lists the preventive care services that should be considered, and provides dates of when you are due. Items listed as completed are up-to-date and do not require any further intervention.    Health Maintenance   Topic Date Due   • HEPATITIS A VACCINE ADULT (1 of 2) 1969   • ZOSTER VACCINE (2 of 2) 2017   • MEDICARE ANNUAL WELLNESS  2018   • PNEUMOCOCCAL VACCINES (65+ LOW/MEDIUM RISK) (2 of 2 - PPSV23) 2019   • MAMMOGRAM  2020   • COLONOSCOPY  2022   • TDAP/TD VACCINES (2 - Td) 2027   • HEPATITIS C SCREENING  Completed   • INFLUENZA VACCINE  Completed       No orders of the defined types were placed in this encounter.      No Follow-up on file.

## 2019-01-21 ENCOUNTER — OFFICE VISIT (OUTPATIENT)
Dept: FAMILY MEDICINE CLINIC | Facility: CLINIC | Age: 69
End: 2019-01-21

## 2019-01-21 VITALS
BODY MASS INDEX: 32.43 KG/M2 | WEIGHT: 206.6 LBS | HEART RATE: 77 BPM | OXYGEN SATURATION: 98 % | SYSTOLIC BLOOD PRESSURE: 140 MMHG | DIASTOLIC BLOOD PRESSURE: 70 MMHG | HEIGHT: 67 IN

## 2019-01-21 DIAGNOSIS — L82.0 SEBORRHEIC KERATOSES, INFLAMED: Primary | ICD-10-CM

## 2019-01-21 PROCEDURE — 17110 DESTRUCTION B9 LES UP TO 14: CPT | Performed by: GENERAL PRACTICE

## 2019-01-21 PROCEDURE — 99212 OFFICE O/P EST SF 10 MIN: CPT | Performed by: GENERAL PRACTICE

## 2019-01-21 NOTE — PROGRESS NOTES
Subjective   Cari Brooks is a 68 y.o. female.   Chief Complaint   Patient presents with   • Follow-up     eval place on upper back     History of Present Illness     Has a lesion on her back present for several years that has started bleeding and is painful. Has to wear a bandaid over it as is irritated by clothing.     The following portions of the patient's history were reviewed and updated as appropriate: allergies, current medications, past social history and problem list.    Outpatient Medications Prior to Visit   Medication Sig Dispense Refill   • amLODIPine (NORVASC) 10 MG tablet Take 1 tablet by mouth Daily. for blood pressure 90 tablet 3   • Cholecalciferol (D3-1000) 1000 UNITS capsule Take 1,000 Units by mouth Daily. cholecalciferol (vitamin D3) 1,000 unit capsule     • hydrOXYzine (ATARAX) 25 MG tablet Take 1 tablet by mouth 3 (Three) Times a Day As Needed for Anxiety. 30 tablet 1   • losartan-hydrochlorothiazide (HYZAAR) 100-25 MG per tablet Take 1 tablet by mouth Daily. for blood pressure 90 tablet 3   • omeprazole (priLOSEC) 20 MG capsule Take 20 mg by mouth Daily As Needed.     • potassium chloride (MICRO-K) 8 MEQ CR capsule Take 1 capsule by mouth 2 (Two) Times a Day. 180 capsule 3   • pseudoephedrine (SUDAFED) 30 MG tablet Take 1 tablet by mouth 2 (Two) Times a Day As Needed for Congestion. 30 tablet 0     No facility-administered medications prior to visit.        Review of Systems   Constitutional: Negative.  Negative for chills, fatigue, fever and unexpected weight change.   HENT: Negative.  Negative for congestion, ear pain, hearing loss, nosebleeds, rhinorrhea, sneezing, sore throat and tinnitus.    Eyes: Negative.  Negative for discharge.   Respiratory: Negative.  Negative for cough, shortness of breath and wheezing.    Cardiovascular: Negative.  Negative for chest pain and palpitations.   Gastrointestinal: Negative.  Negative for abdominal pain, constipation, diarrhea, nausea and  "vomiting.   Endocrine: Negative.    Genitourinary: Negative.  Negative for dysuria, frequency and urgency.   Musculoskeletal: Negative.  Negative for arthralgias, back pain, joint swelling, myalgias and neck pain.   Skin: Negative.  Negative for rash.   Allergic/Immunologic: Negative.    Neurological: Negative.  Negative for dizziness, weakness, numbness and headaches.   Hematological: Negative.  Negative for adenopathy.   Psychiatric/Behavioral: Negative.  Negative for dysphoric mood and sleep disturbance. The patient is not nervous/anxious.        Objective   Visit Vitals  /70   Pulse 77   Ht 170.2 cm (67\")   Wt 93.7 kg (206 lb 9.6 oz)   LMP  (LMP Unknown)   SpO2 98%   BMI 32.36 kg/m²     Physical Exam   Constitutional: She is oriented to person, place, and time. She appears well-developed and well-nourished. No distress.   HENT:   Head: Normocephalic and atraumatic.   Eyes: Conjunctivae and EOM are normal. Pupils are equal, round, and reactive to light. Right eye exhibits no discharge. Left eye exhibits no discharge.   Neck: No thyromegaly present.   Pulmonary/Chest: Effort normal.   Lymphadenopathy:     She has no cervical adenopathy.   Neurological: She is alert and oriented to person, place, and time.   Skin: Skin is warm and dry.        Lesion(s) treated with liquid nitrogen freeze/thaw ×2 - wound care advice given    Psychiatric: She has a normal mood and affect.   Nursing note and vitals reviewed.      Assessment/Plan   Problem List Items Addressed This Visit     None      Visit Diagnoses     Seborrheic keratoses, inflamed    -  Primary          Recheck if not improving.  Follow up as scheduled.     No orders of the defined types were placed in this encounter.    Return for Next scheduled follow up.  "

## 2019-02-01 ENCOUNTER — TELEPHONE (OUTPATIENT)
Dept: FAMILY MEDICINE CLINIC | Facility: CLINIC | Age: 69
End: 2019-02-01

## 2019-05-10 RX ORDER — OMEPRAZOLE 20 MG/1
CAPSULE, DELAYED RELEASE ORAL
Qty: 30 CAPSULE | Refills: 4 | Status: SHIPPED | OUTPATIENT
Start: 2019-05-10 | End: 2019-07-22 | Stop reason: SDUPTHER

## 2019-07-18 DIAGNOSIS — I10 ESSENTIAL HYPERTENSION: Primary | Chronic | ICD-10-CM

## 2019-07-22 ENCOUNTER — OFFICE VISIT (OUTPATIENT)
Dept: FAMILY MEDICINE CLINIC | Facility: CLINIC | Age: 69
End: 2019-07-22

## 2019-07-22 ENCOUNTER — LAB (OUTPATIENT)
Dept: LAB | Facility: HOSPITAL | Age: 69
End: 2019-07-22

## 2019-07-22 VITALS
BODY MASS INDEX: 32.27 KG/M2 | HEART RATE: 81 BPM | OXYGEN SATURATION: 98 % | SYSTOLIC BLOOD PRESSURE: 120 MMHG | DIASTOLIC BLOOD PRESSURE: 70 MMHG | HEIGHT: 66 IN | WEIGHT: 200.8 LBS

## 2019-07-22 DIAGNOSIS — Z12.31 ENCOUNTER FOR SCREENING MAMMOGRAM FOR MALIGNANT NEOPLASM OF BREAST: Primary | ICD-10-CM

## 2019-07-22 DIAGNOSIS — R10.84 GENERALIZED ABDOMINAL PAIN: ICD-10-CM

## 2019-07-22 DIAGNOSIS — I10 ESSENTIAL HYPERTENSION: ICD-10-CM

## 2019-07-22 DIAGNOSIS — I10 ESSENTIAL HYPERTENSION: Primary | Chronic | ICD-10-CM

## 2019-07-22 DIAGNOSIS — R19.7 NAUSEA, VOMITING AND DIARRHEA: ICD-10-CM

## 2019-07-22 DIAGNOSIS — K21.9 GERD WITHOUT ESOPHAGITIS: Chronic | ICD-10-CM

## 2019-07-22 DIAGNOSIS — R11.2 NAUSEA, VOMITING AND DIARRHEA: ICD-10-CM

## 2019-07-22 DIAGNOSIS — Z13.820 ENCOUNTER FOR SCREENING FOR OSTEOPOROSIS: ICD-10-CM

## 2019-07-22 DIAGNOSIS — Z78.0 POSTMENOPAUSAL: ICD-10-CM

## 2019-07-22 DIAGNOSIS — Z12.31 ENCOUNTER FOR SCREENING MAMMOGRAM FOR BREAST CANCER: ICD-10-CM

## 2019-07-22 DIAGNOSIS — F41.1 ANXIETY STATE: ICD-10-CM

## 2019-07-22 LAB
ALBUMIN SERPL-MCNC: 4.5 G/DL (ref 3.5–5.2)
ALBUMIN/GLOB SERPL: 1.3 G/DL
ALP SERPL-CCNC: 78 U/L (ref 39–117)
ALT SERPL W P-5'-P-CCNC: 20 U/L (ref 1–33)
ANION GAP SERPL CALCULATED.3IONS-SCNC: 13 MMOL/L (ref 5–15)
AST SERPL-CCNC: 20 U/L (ref 1–32)
BACTERIA UR QL AUTO: ABNORMAL /HPF
BILIRUB SERPL-MCNC: 0.6 MG/DL (ref 0.2–1.2)
BILIRUB UR QL STRIP: NEGATIVE
BUN BLD-MCNC: 15 MG/DL (ref 8–23)
BUN/CREAT SERPL: 21.7 (ref 7–25)
CALCIUM SPEC-SCNC: 10.4 MG/DL (ref 8.6–10.5)
CHLORIDE SERPL-SCNC: 100 MMOL/L (ref 98–107)
CHOLEST SERPL-MCNC: 184 MG/DL (ref 0–200)
CLARITY UR: CLEAR
CO2 SERPL-SCNC: 29 MMOL/L (ref 22–29)
COLOR UR: YELLOW
CREAT BLD-MCNC: 0.69 MG/DL (ref 0.57–1)
GFR SERPL CREATININE-BSD FRML MDRD: 85 ML/MIN/1.73
GLOBULIN UR ELPH-MCNC: 3.5 GM/DL
GLUCOSE BLD-MCNC: 96 MG/DL (ref 65–99)
GLUCOSE UR STRIP-MCNC: NEGATIVE MG/DL
HDLC SERPL-MCNC: 50 MG/DL (ref 40–60)
HGB UR QL STRIP.AUTO: NEGATIVE
HYALINE CASTS UR QL AUTO: ABNORMAL /LPF
KETONES UR QL STRIP: NEGATIVE
LDLC SERPL CALC-MCNC: 92 MG/DL (ref 0–100)
LDLC/HDLC SERPL: 1.83 {RATIO}
LEUKOCYTE ESTERASE UR QL STRIP.AUTO: ABNORMAL
NITRITE UR QL STRIP: NEGATIVE
PH UR STRIP.AUTO: 7 [PH] (ref 5–8)
POTASSIUM BLD-SCNC: 3.6 MMOL/L (ref 3.5–5.2)
PROT SERPL-MCNC: 8 G/DL (ref 6–8.5)
PROT UR QL STRIP: NEGATIVE
RBC # UR: ABNORMAL /HPF
REF LAB TEST METHOD: ABNORMAL
SODIUM BLD-SCNC: 142 MMOL/L (ref 136–145)
SP GR UR STRIP: 1.02 (ref 1–1.03)
SQUAMOUS #/AREA URNS HPF: ABNORMAL /HPF
TRIGL SERPL-MCNC: 212 MG/DL (ref 0–150)
UROBILINOGEN UR QL STRIP: ABNORMAL
VLDLC SERPL-MCNC: 42.4 MG/DL (ref 5–40)
WBC UR QL AUTO: ABNORMAL /HPF

## 2019-07-22 PROCEDURE — 36415 COLL VENOUS BLD VENIPUNCTURE: CPT

## 2019-07-22 PROCEDURE — 80061 LIPID PANEL: CPT

## 2019-07-22 PROCEDURE — 81001 URINALYSIS AUTO W/SCOPE: CPT

## 2019-07-22 PROCEDURE — 80053 COMPREHEN METABOLIC PANEL: CPT

## 2019-07-22 PROCEDURE — 99214 OFFICE O/P EST MOD 30 MIN: CPT | Performed by: GENERAL PRACTICE

## 2019-07-22 RX ORDER — HYDROXYZINE HYDROCHLORIDE 25 MG/1
25 TABLET, FILM COATED ORAL 3 TIMES DAILY PRN
Qty: 30 TABLET | Refills: 1 | Status: SHIPPED | OUTPATIENT
Start: 2019-07-22 | End: 2020-01-22 | Stop reason: SDUPTHER

## 2019-07-22 RX ORDER — OMEPRAZOLE 20 MG/1
20 CAPSULE, DELAYED RELEASE ORAL DAILY
Qty: 90 CAPSULE | Refills: 3 | Status: SHIPPED | OUTPATIENT
Start: 2019-07-22 | End: 2020-01-31

## 2019-07-22 RX ORDER — AMLODIPINE BESYLATE 10 MG/1
10 TABLET ORAL DAILY
Qty: 90 TABLET | Refills: 3 | Status: SHIPPED | OUTPATIENT
Start: 2019-07-22 | End: 2020-07-21 | Stop reason: SDUPTHER

## 2019-07-22 RX ORDER — LOSARTAN POTASSIUM AND HYDROCHLOROTHIAZIDE 25; 100 MG/1; MG/1
1 TABLET ORAL DAILY
Qty: 90 TABLET | Refills: 3 | Status: SHIPPED | OUTPATIENT
Start: 2019-07-22 | End: 2019-11-19 | Stop reason: ALTCHOICE

## 2019-07-22 NOTE — PROGRESS NOTES
Subjective   Cari Brooks is a 68 y.o. female.     Chief Complaint   Patient presents with   • Annual Exam     labs richy     For review and evaluation of management of chronic medical problems. Labs pending. Due for mammogram and bone density. Has nausea, vomiting and diarrhea and weakness every 2-3 months, not sure what causes it. Lasts for 8-12 hours. Becoming more frequent. GERD symptoms are stable, takes omeprazole prn.  Hypertension   This is a chronic problem. The current episode started more than 1 year ago. The problem is unchanged. The problem is controlled. Pertinent negatives include no chest pain, headaches, neck pain, palpitations or shortness of breath. There are no associated agents to hypertension. Current antihypertension treatment includes angiotensin blockers, diuretics and calcium channel blockers. The current treatment provides significant improvement. There are no compliance problems.    Nausea   The current episode started more than 1 year ago. The problem occurs intermittently. The problem has been gradually worsening. Associated symptoms include abdominal pain, vomiting and weakness. Pertinent negatives include no arthralgias, chest pain, chills, congestion, coughing, fatigue, fever, headaches, joint swelling, myalgias, nausea, neck pain, numbness, rash or sore throat. Associated symptoms comments: diarrhea. Nothing aggravates the symptoms. She has tried nothing for the symptoms.      The following portions of the patient's history were reviewed and updated as appropriate: allergies, current medications, past family and social history and problem list.    Outpatient Medications Prior to Visit   Medication Sig Dispense Refill   • Cholecalciferol (D3-1000) 1000 units capsule Take 1,000 Units by mouth Daily. cholecalciferol (vitamin D3) 1,000 unit capsule      • potassium chloride (MICRO-K) 8 MEQ CR capsule Take 1 capsule by mouth 2 (Two) Times a Day. 180 capsule 3   • amLODIPine  "(NORVASC) 10 MG tablet Take 1 tablet by mouth Daily. for blood pressure 90 tablet 3   • hydrOXYzine (ATARAX) 25 MG tablet Take 1 tablet by mouth 3 (Three) Times a Day As Needed for Anxiety. 30 tablet 1   • losartan-hydrochlorothiazide (HYZAAR) 100-25 MG per tablet Take 1 tablet by mouth Daily. for blood pressure 90 tablet 3   • omeprazole (priLOSEC) 20 MG capsule TAKE 1 CAPSULE DAILY 30 capsule 4     No facility-administered medications prior to visit.        Review of Systems   Constitutional: Negative for chills, fatigue, fever and unexpected weight change.   HENT: Negative.  Negative for congestion, ear pain, hearing loss, nosebleeds, rhinorrhea, sneezing, sore throat and tinnitus.    Eyes: Negative.  Negative for discharge.   Respiratory: Negative.  Negative for cough, shortness of breath and wheezing.    Cardiovascular: Negative.  Negative for chest pain and palpitations.   Gastrointestinal: Positive for abdominal pain and vomiting. Negative for constipation, diarrhea and nausea.   Endocrine: Negative.    Genitourinary: Negative.  Negative for dysuria, frequency and urgency.   Musculoskeletal: Negative.  Negative for arthralgias, back pain, joint swelling, myalgias and neck pain.   Skin: Negative.  Negative for rash.   Allergic/Immunologic: Negative.    Neurological: Positive for weakness. Negative for dizziness, numbness and headaches.   Hematological: Negative.  Negative for adenopathy.   Psychiatric/Behavioral: Negative.  Negative for dysphoric mood and sleep disturbance. The patient is not nervous/anxious.      Objective     Visit Vitals  /70   Pulse 81   Ht 167.6 cm (66\")   Wt 91.1 kg (200 lb 12.8 oz)   LMP  (LMP Unknown)   SpO2 98%   BMI 32.41 kg/m²     Physical Exam   Constitutional: She is oriented to person, place, and time. She appears well-developed and well-nourished. No distress.   HENT:   Head: Normocephalic and atraumatic.   Nose: Nose normal.   Mouth/Throat: Oropharynx is clear and moist. "   Eyes: Conjunctivae and EOM are normal. Pupils are equal, round, and reactive to light. Right eye exhibits no discharge. Left eye exhibits no discharge.   Neck: No tracheal deviation present. No thyromegaly present.   Cardiovascular: Normal rate, regular rhythm, normal heart sounds and intact distal pulses.   No murmur heard.  Pulmonary/Chest: Effort normal and breath sounds normal. No respiratory distress. She has no wheezes. She has no rales. She exhibits no tenderness. Right breast exhibits no inverted nipple, no mass, no nipple discharge, no skin change and no tenderness. Left breast exhibits no inverted nipple, no mass, no nipple discharge, no skin change and no tenderness.   Abdominal: Soft. Bowel sounds are normal. She exhibits no distension and no mass. There is no tenderness. No hernia.   Musculoskeletal: Normal range of motion. She exhibits no deformity.   Lymphadenopathy:     She has no cervical adenopathy.   Neurological: She is alert and oriented to person, place, and time. She has normal reflexes.   Skin: Skin is warm and dry.   Psychiatric: She has a normal mood and affect. Her behavior is normal. Judgment and thought content normal.   Nursing note and vitals reviewed.    Assessment/Plan   Problem List Items Addressed This Visit        Cardiovascular and Mediastinum    Essential hypertension - Primary (Chronic)    Relevant Medications    amLODIPine (NORVASC) 10 MG tablet    losartan-hydrochlorothiazide (HYZAAR) 100-25 MG per tablet       Digestive    GERD without esophagitis (Chronic)    Relevant Medications    omeprazole (priLOSEC) 20 MG capsule       Other    Anxiety state    Relevant Medications    hydrOXYzine (ATARAX) 25 MG tablet      Other Visit Diagnoses     Nausea, vomiting and diarrhea        Relevant Orders    CT Abdomen Pelvis With Contrast    Generalized abdominal pain        Relevant Orders    CT Abdomen Pelvis With Contrast    Encounter for screening mammogram for breast cancer         Postmenopausal        Relevant Orders    DEXA Bone Density Axial    Encounter for screening for osteoporosis        Relevant Orders    DEXA Bone Density Axial         Will notify regarding results. Continue current treatment.     New Medications Ordered This Visit   Medications   • amLODIPine (NORVASC) 10 MG tablet     Sig: Take 1 tablet by mouth Daily. for blood pressure     Dispense:  90 tablet     Refill:  3   • losartan-hydrochlorothiazide (HYZAAR) 100-25 MG per tablet     Sig: Take 1 tablet by mouth Daily. for blood pressure     Dispense:  90 tablet     Refill:  3   • omeprazole (priLOSEC) 20 MG capsule     Sig: Take 1 capsule by mouth Daily. Prn for reflux     Dispense:  90 capsule     Refill:  3   • hydrOXYzine (ATARAX) 25 MG tablet     Sig: Take 1 tablet by mouth 3 (Three) Times a Day As Needed for Anxiety.     Dispense:  30 tablet     Refill:  1     Return in about 6 months (around 1/22/2020) for Recheck.

## 2019-07-29 ENCOUNTER — HOSPITAL ENCOUNTER (OUTPATIENT)
Dept: CT IMAGING | Facility: HOSPITAL | Age: 69
Discharge: HOME | End: 2019-07-29
Admitting: GENERAL PRACTICE

## 2019-07-29 DIAGNOSIS — R19.7 NAUSEA, VOMITING AND DIARRHEA: ICD-10-CM

## 2019-07-29 DIAGNOSIS — R10.84 GENERALIZED ABDOMINAL PAIN: ICD-10-CM

## 2019-07-29 DIAGNOSIS — R11.2 NAUSEA, VOMITING AND DIARRHEA: ICD-10-CM

## 2019-07-29 PROCEDURE — 74177 CT ABD & PELVIS W/CONTRAST: CPT

## 2019-07-29 PROCEDURE — 25010000002 IOPAMIDOL 61 % SOLUTION: Performed by: GENERAL PRACTICE

## 2019-07-29 RX ADMIN — IOPAMIDOL 90 ML: 612 INJECTION, SOLUTION INTRAVENOUS at 16:24

## 2019-07-30 ENCOUNTER — TELEPHONE (OUTPATIENT)
Dept: FAMILY MEDICINE CLINIC | Facility: CLINIC | Age: 69
End: 2019-07-30

## 2019-07-30 NOTE — PROGRESS NOTES
Per  DR. Stockton, MsWendy Crai Brooks has been called with recent CT of the Abdomen & Pelvis results & recommendations.  Continue current medications and follow-up as planned or sooner if any problems.

## 2019-09-17 ENCOUNTER — TELEPHONE (OUTPATIENT)
Dept: FAMILY MEDICINE CLINIC | Facility: CLINIC | Age: 69
End: 2019-09-17

## 2019-09-17 NOTE — TELEPHONE ENCOUNTER
Patient called to see if Dr Stockton will send in a referral to Gastro for her stomach,. She states she is still having issues. Call her at [phone] and let her know.

## 2019-09-18 NOTE — TELEPHONE ENCOUNTER
She is being referred to Dr Arnett after being seen in [facility] urgent care.

## 2019-09-23 ENCOUNTER — CONSULT (OUTPATIENT)
Dept: SURGERY | Facility: CLINIC | Age: 69
End: 2019-09-23

## 2019-09-23 VITALS
SYSTOLIC BLOOD PRESSURE: 160 MMHG | HEART RATE: 84 BPM | HEIGHT: 66 IN | BODY MASS INDEX: 32.78 KG/M2 | WEIGHT: 204 LBS | TEMPERATURE: 98 F | DIASTOLIC BLOOD PRESSURE: 80 MMHG

## 2019-09-23 DIAGNOSIS — R19.7 DIARRHEA IN ADULT PATIENT: Primary | ICD-10-CM

## 2019-09-23 PROCEDURE — 99202 OFFICE O/P NEW SF 15 MIN: CPT | Performed by: SURGERY

## 2019-09-23 NOTE — PROGRESS NOTES
Chief Complaint   Patient presents with   • Abdominal Pain     abdominal pain        HPI  68 year old woman with a history of chronic abdominal pain. Has had some nausea and vomiting. Was seen at [facility] where a CT scan was performed:    Result Impression     1. Mild to moderate diverticulosis distal colon, no active inflammation .  2. The mild distention of several loops of jejunum does not delay the passage of barium and there is a caliber change in the low midline abdomen that may represent an adhesion.  3. No source of abdominal pain identified    [id number]   Result Narrative   Generalized abdominal pain .    CT abdomen and pelvis without IV contrast: The liver has a homogeneous normal appearance . The gallbladder has been removed . The common duct is unremarkable . No pancreatic mass or inflammation . The spleen has a normal appearance . The adrenal glands   are little plump but no nodules are seen . No ureteral stones . Kidney stones, hydronephrosis, or perinephric edema . No retroperitoneal adenopathy or mass    The bladder has a normal appearance . The uterus has been removed . The ovaries are not seen and have either been removed or are atrophic . No pelvic adenopathy or inflammation . The distal colon has a number of diverticula, no mural thickening or   pericolonic edema . The colon is otherwise unremarkable . the distal ileum has a normal appearance . Much of the jejunum is a little larger caliber than the ileum . There may be a low-grade partial obstruction of the jejunum but this does not  delay  the   passage of barium and has little or no functional impact . the peritoneal surfaces are unremarkable and no free fluid is seen .       No fever, chills, night sweats, weight loss.   Past Medical History:   Diagnosis Date   • Acute maxillary sinusitis    • Acute sinusitis    • Allergic rhinitis    • Anxiety state    • Asthma    • Bronchopneumonia    • Contact dermatitis due to plant    • Cough    •  Edema of lower extremity    • Encounter for general adult medical examination without abnormal findings    • Encounter for immunization    • Encounter for routine adult health examination    • Equinus contracture of ankle    • Essential hypertension    • Fever    • GERD (gastroesophageal reflux disease)    • Heel pain    • Knee pain    • Osteoarthritis    • Otalgia    • Pain in limb    • Plantar fasciitis    • PONV (postoperative nausea and vomiting)    • Routine general medical examination at health care facility    • Screening for malignant neoplasm of colon    • Screening mammogram, encounter for    • Synovial cyst of knee    • Vitamin D deficiency        Past Surgical History:   Procedure Laterality Date   • BACK SURGERY  06/2018   • ENDOSCOPY  11/13/2012    Colon endoscopy 85158 (2)    Mild diverticulosis in sigmoid colon. 1 polyp in colon 35 cm from entry & 1 polyp in colon 15 cm from entry; both removed by cold biopsy polypectomy. Hemorrhoids found.    • HYSTERECTOMY  1986    Hysterectomy w/ oophorectomy (1)     • INJECTION OF MEDICATION  09/03/2015    Kenalog (3)      • KNEE ARTHROSCOPY     • KNEE ARTHROSCOPY Left 10/15/2018    Procedure: KNEE ARTHROSCOPY with debridement medial meniscus  ;  Surgeon: Nathen Zapien MD;  Location: [facility];  Service: Orthopedics   • LAPAROSCOPIC CHOLECYSTECTOMY  10/04/2001   • OOPHORECTOMY     • PAP SMEAR  02/12/2009   • REPAIR ANKLE LIGAMENT  10/12/2004   • TONSILLECTOMY  06/20/1956         Current Outpatient Medications:   •  amLODIPine (NORVASC) 10 MG tablet, Take 1 tablet by mouth Daily. for blood pressure, Disp: 90 tablet, Rfl: 3  •  Cholecalciferol (D3-1000) 1000 units capsule, Take 1,000 Units by mouth Daily. cholecalciferol (vitamin D3) 1,000 unit capsule , Disp: , Rfl:   •  hydrOXYzine (ATARAX) 25 MG tablet, Take 1 tablet by mouth 3 (Three) Times a Day As Needed for Anxiety., Disp: 30 tablet, Rfl: 1  •  losartan-hydrochlorothiazide (HYZAAR) 100-25 MG per  tablet, Take 1 tablet by mouth Daily. for blood pressure, Disp: 90 tablet, Rfl: 3  •  omeprazole (priLOSEC) 20 MG capsule, Take 1 capsule by mouth Daily. Prn for reflux, Disp: 90 capsule, Rfl: 3  •  potassium chloride (MICRO-K) 8 MEQ CR capsule, Take 1 capsule by mouth 2 (Two) Times a Day., Disp: 180 capsule, Rfl: 3    Allergies   Allergen Reactions   • Zestril [Lisinopril] Shortness Of Breath   • Penicillins Rash       Family History   Problem Relation Age of Onset   • Cancer Mother         breast   • Breast cancer Mother    • Arthritis Mother    • Hypertension Mother    • ALS Father    • Cancer Father    • Hypertension Father    • Diabetes Other    • Hypertension Other    • Cancer Other         stomach       Social History     Socioeconomic History   • Marital status:      Spouse name: Not on file   • Number of children: Not on file   • Years of education: Not on file   • Highest education level: Not on file   Tobacco Use   • Smoking status: Never Smoker   • Smokeless tobacco: Never Used   Substance and Sexual Activity   • Alcohol use: No   • Drug use: No   • Sexual activity: Defer       Review of Systems   Constitutional: Negative for appetite change, chills, fever and unexpected weight change.   HENT: Negative for hearing loss, nosebleeds and trouble swallowing.    Eyes: Negative for visual disturbance.   Respiratory: Negative for apnea, cough, choking, chest tightness, shortness of breath, wheezing and stridor.    Cardiovascular: Negative for chest pain, palpitations and leg swelling.   Gastrointestinal: Positive for abdominal pain, diarrhea, nausea and vomiting. Negative for abdominal distention, blood in stool and constipation.   Endocrine: Negative for cold intolerance, heat intolerance, polydipsia, polyphagia and polyuria.   Genitourinary: Negative for difficulty urinating, dysuria, frequency, hematuria and urgency.   Musculoskeletal: Negative for arthralgias, back pain, myalgias and neck pain.    Skin: Negative for color change, pallor and rash.   Allergic/Immunologic: Negative for immunocompromised state.   Neurological: Negative for dizziness, seizures, syncope, light-headedness, numbness and headaches.   Hematological: Negative for adenopathy.   Psychiatric/Behavioral: Negative for suicidal ideas. The patient is not nervous/anxious.        Physical Exam   Constitutional: She is oriented to person, place, and time. She appears well-developed and well-nourished. No distress.   HENT:   Head: Normocephalic and atraumatic.   Mouth/Throat: No oropharyngeal exudate.   Eyes: EOM are normal. Pupils are equal, round, and reactive to light. No scleral icterus.   Neck: Normal range of motion. Neck supple. No JVD present. No tracheal deviation present. No thyromegaly present.   Cardiovascular: Normal rate, regular rhythm and normal heart sounds.   Pulmonary/Chest: Effort normal and breath sounds normal. No stridor. No respiratory distress. She has no wheezes. She has no rales.   Abdominal: Soft. Bowel sounds are normal. She exhibits no distension and no mass. There is no tenderness. There is no rebound and no guarding. No hernia.   Musculoskeletal: Normal range of motion. She exhibits no edema, tenderness or deformity.   Lymphadenopathy:     She has no cervical adenopathy.     She has no axillary adenopathy.   Neurological: She is alert and oriented to person, place, and time.   Skin: Skin is warm and dry. No rash noted. She is not diaphoretic. No erythema. No pallor.   Psychiatric: She has a normal mood and affect. Her behavior is normal. Judgment normal.   Vitals reviewed.        ASSESSMENT    Cari was seen today for abdominal pain.    Diagnoses and all orders for this visit:    Diarrhea in adult patient  -     Ambulatory Referral to Gastroenterology        PLAN    1. Needs endoscopy  2. Recheck after above              This document has been electronically signed by Brad Arnett MD on September 29, 2019 1:11  PM

## 2019-09-23 NOTE — PATIENT INSTRUCTIONS

## 2019-10-07 RX ORDER — POTASSIUM CHLORIDE 600 MG/1
TABLET, FILM COATED, EXTENDED RELEASE ORAL
Qty: 180 TABLET | Refills: 4 | Status: SHIPPED | OUTPATIENT
Start: 2019-10-07 | End: 2020-01-31 | Stop reason: SDUPTHER

## 2019-10-30 ENCOUNTER — ANESTHESIA EVENT (OUTPATIENT)
Dept: GASTROENTEROLOGY | Facility: HOSPITAL | Age: 69
End: 2019-10-30

## 2019-10-30 ENCOUNTER — HOSPITAL ENCOUNTER (OUTPATIENT)
Facility: HOSPITAL | Age: 69
Setting detail: HOSPITAL OUTPATIENT SURGERY
Discharge: HOME | End: 2019-10-30
Attending: INTERNAL MEDICINE | Admitting: INTERNAL MEDICINE

## 2019-10-30 ENCOUNTER — ANESTHESIA (OUTPATIENT)
Dept: GASTROENTEROLOGY | Facility: HOSPITAL | Age: 69
End: 2019-10-30

## 2019-10-30 VITALS
SYSTOLIC BLOOD PRESSURE: 135 MMHG | OXYGEN SATURATION: 98 % | WEIGHT: 195.5 LBS | TEMPERATURE: 97.6 F | HEART RATE: 66 BPM | DIASTOLIC BLOOD PRESSURE: 65 MMHG | RESPIRATION RATE: 18 BRPM | BODY MASS INDEX: 31.42 KG/M2 | HEIGHT: 66 IN

## 2019-10-30 DIAGNOSIS — R19.7 DIARRHEA: ICD-10-CM

## 2019-10-30 DIAGNOSIS — R10.84 ABDOMINAL PAIN, GENERALIZED: ICD-10-CM

## 2019-10-30 PROCEDURE — 87071 CULTURE AEROBIC QUANT OTHER: CPT | Performed by: INTERNAL MEDICINE

## 2019-10-30 PROCEDURE — 88305 TISSUE EXAM BY PATHOLOGIST: CPT | Performed by: INTERNAL MEDICINE

## 2019-10-30 PROCEDURE — 88305 TISSUE EXAM BY PATHOLOGIST: CPT | Performed by: PATHOLOGY

## 2019-10-30 PROCEDURE — 25010000002 PROPOFOL 10 MG/ML EMULSION: Performed by: NURSE ANESTHETIST, CERTIFIED REGISTERED

## 2019-10-30 PROCEDURE — 87076 CULTURE ANAEROBE IDENT EACH: CPT | Performed by: INTERNAL MEDICINE

## 2019-10-30 RX ORDER — LIDOCAINE HYDROCHLORIDE 20 MG/ML
INJECTION, SOLUTION INTRAVENOUS AS NEEDED
Status: DISCONTINUED | OUTPATIENT
Start: 2019-10-30 | End: 2019-10-30 | Stop reason: SURG

## 2019-10-30 RX ORDER — DEXTROSE AND SODIUM CHLORIDE 5; .45 G/100ML; G/100ML
30 INJECTION, SOLUTION INTRAVENOUS CONTINUOUS PRN
Status: DISCONTINUED | OUTPATIENT
Start: 2019-10-30 | End: 2019-10-30 | Stop reason: HOSPADM

## 2019-10-30 RX ORDER — PROPOFOL 10 MG/ML
VIAL (ML) INTRAVENOUS AS NEEDED
Status: DISCONTINUED | OUTPATIENT
Start: 2019-10-30 | End: 2019-10-30 | Stop reason: SURG

## 2019-10-30 RX ADMIN — PROPOFOL 40 MG: 10 INJECTION, EMULSION INTRAVENOUS at 17:22

## 2019-10-30 RX ADMIN — PROPOFOL 20 MG: 10 INJECTION, EMULSION INTRAVENOUS at 17:28

## 2019-10-30 RX ADMIN — PROPOFOL 20 MG: 10 INJECTION, EMULSION INTRAVENOUS at 17:20

## 2019-10-30 RX ADMIN — PROPOFOL 40 MG: 10 INJECTION, EMULSION INTRAVENOUS at 17:32

## 2019-10-30 RX ADMIN — PROPOFOL 20 MG: 10 INJECTION, EMULSION INTRAVENOUS at 17:23

## 2019-10-30 RX ADMIN — PROPOFOL 80 MG: 10 INJECTION, EMULSION INTRAVENOUS at 17:18

## 2019-10-30 RX ADMIN — DEXTROSE AND SODIUM CHLORIDE 30 ML/HR: 5; 450 INJECTION, SOLUTION INTRAVENOUS at 16:40

## 2019-10-30 RX ADMIN — PROPOFOL 20 MG: 10 INJECTION, EMULSION INTRAVENOUS at 17:21

## 2019-10-30 RX ADMIN — LIDOCAINE HYDROCHLORIDE 100 MG: 20 INJECTION, SOLUTION INTRAVENOUS at 17:18

## 2019-10-30 RX ADMIN — PROPOFOL 20 MG: 10 INJECTION, EMULSION INTRAVENOUS at 17:25

## 2019-10-30 NOTE — ANESTHESIA POSTPROCEDURE EVALUATION
Patient: Cari Brooks    Procedure Summary     Date:  10/30/19 Room / Location:  [facility] ENDOSCOPY 2 / [facility] ENDOSCOPY    Anesthesia Start:  1715 Anesthesia Stop:  1735    Procedures:       ESOPHAGOGASTRODUODENOSCOPY (N/A )      COLONOSCOPY (N/A ) Diagnosis:       Diarrhea      Abdominal pain, generalized      (Diarrhea [R19.7])      (Abdominal pain, generalized [R10.84])    Surgeon:  Rd Angel DO Provider:  Ramirez Stephens CRNA    Anesthesia Type:  MAC ASA Status:  3          Anesthesia Type: MAC  Last vitals  BP   159/78 (10/30/19 1631)   Temp   98.7 °F (37.1 °C) (10/30/19 1617)   Pulse   69 (10/30/19 1617)   Resp   18 (10/30/19 1617)     SpO2   97 % (10/30/19 1630)     Post Anesthesia Care and Evaluation    Patient location during evaluation: bedside  Patient participation: complete - patient participated  Level of consciousness: awake  Pain score: 0  Pain management: adequate  Airway patency: patent  Anesthetic complications: No anesthetic complications  PONV Status: none  Cardiovascular status: acceptable  Respiratory status: acceptable  Hydration status: acceptable

## 2019-10-30 NOTE — ANESTHESIA PREPROCEDURE EVALUATION
Anesthesia Evaluation     Patient summary reviewed and Nursing notes reviewed   history of anesthetic complications: PONV  NPO Solid Status: > 8 hours  NPO Liquid Status: > 8 hours           Airway   Mallampati: III  TM distance: >3 FB  Neck ROM: full  No difficulty expected  Dental - normal exam     Pulmonary - normal exam   (+) asthma,  Cardiovascular - normal exam    (+) hypertension,       Neuro/Psych  (+) psychiatric history Anxiety,     GI/Hepatic/Renal/Endo    (+)  GERD,      Musculoskeletal     Abdominal    Substance History      OB/GYN          Other   arthritis,            Anesthesia Evaluation     Patient summary reviewed and Nursing notes reviewed   history of anesthetic complications (Scopalamine patch applied pre-op.): PONV  NPO Solid Status: > 8 hours  NPO Liquid Status: > 8 hours           Airway   Mallampati: II  TM distance: >3 FB  Neck ROM: full  possible difficult intubation  Dental    (+) poor dentation    Comment: Left upper incisor with crown. Lower dentition with grounded surfaces.    Pulmonary - normal exam    breath sounds clear to auscultation  (+) pneumonia resolved , asthma,   (-) not a smoker    ROS comment:    HISTORY: Cough. Fever.     Frontal and lateral films of the chest were obtained.  COMPARISON: November 26, 2013     The lungs are clear of an acute process.  The heart is not enlarged.  The pulmonary vasculature is not increased.  No pleural effusion.  No pneumothorax.  No acute osseous abnormality.  Degenerative changes are present in the thoracic spine.  Surgical clips right upper quadrant of the abdomen.     IMPRESSION:  CONCLUSION:  No Acute Disease     [id number]     Electronically signed by:  Maciel Flores MD  3/27/2018 9:59 AM CDT  Cardiovascular - normal exam    ECG reviewed  Rhythm: regular  Rate: normal    (+) hypertension, murmur (Grade II/VI systolic),     ROS comment: Normal sinus rhythm  Normal ECG  When compared with ECG of 15-DEC-2015 08:48,  No significant change was  found  Confirmed by LUIS F RAYO MD ([id number]) on 10/12/2018 12:40:24    Neuro/Psych  (+) psychiatric history Anxiety,     GI/Hepatic/Renal/Endo    (+) obesity,  GERD well controlled,      Musculoskeletal     Abdominal   (+) obese,    Substance History - negative use     OB/GYN negative ob/gyn ROS         Other   (+) arthritis                     Anesthesia Plan    ASA 3     general     intravenous induction   Anesthetic plan, all risks, benefits, and alternatives have been provided, discussed and informed consent has been obtained with: patient and spouse/significant other.             Anesthesia Plan    ASA 3     MAC     intravenous induction   Anesthetic plan, all risks, benefits, and alternatives have been provided, discussed and informed consent has been obtained with: patient.

## 2019-11-01 LAB
BACTERIA SPEC AEROBE CULT: ABNORMAL
BACTERIA SPEC AEROBE CULT: ABNORMAL
LAB AP CASE REPORT: NORMAL
PATH REPORT.FINAL DX SPEC: NORMAL
PATH REPORT.GROSS SPEC: NORMAL

## 2019-11-19 RX ORDER — HYDROCHLOROTHIAZIDE 25 MG/1
25 TABLET ORAL DAILY
Qty: 90 TABLET | Refills: 1 | Status: SHIPPED | OUTPATIENT
Start: 2019-11-19 | End: 2020-06-02

## 2019-11-19 RX ORDER — LOSARTAN POTASSIUM 100 MG/1
100 TABLET ORAL DAILY
Qty: 90 TABLET | Refills: 1 | Status: SHIPPED | OUTPATIENT
Start: 2019-11-19 | End: 2020-06-02

## 2020-01-16 ENCOUNTER — HOSPITAL ENCOUNTER (OUTPATIENT)
Dept: CT IMAGING | Facility: HOSPITAL | Age: 70
Discharge: HOME | End: 2020-01-16
Admitting: INTERNAL MEDICINE

## 2020-01-16 DIAGNOSIS — K66.0 ADHESION OF DIAPHRAGM: ICD-10-CM

## 2020-01-16 PROCEDURE — 74176 CT ABD & PELVIS W/O CONTRAST: CPT

## 2020-01-22 ENCOUNTER — LAB (OUTPATIENT)
Dept: LAB | Facility: HOSPITAL | Age: 70
End: 2020-01-22

## 2020-01-22 ENCOUNTER — OFFICE VISIT (OUTPATIENT)
Dept: FAMILY MEDICINE CLINIC | Facility: CLINIC | Age: 70
End: 2020-01-22

## 2020-01-22 VITALS
SYSTOLIC BLOOD PRESSURE: 122 MMHG | DIASTOLIC BLOOD PRESSURE: 60 MMHG | OXYGEN SATURATION: 96 % | HEART RATE: 77 BPM | HEIGHT: 66 IN | WEIGHT: 195.8 LBS | BODY MASS INDEX: 31.47 KG/M2

## 2020-01-22 DIAGNOSIS — R11.2 NAUSEA, VOMITING AND DIARRHEA: ICD-10-CM

## 2020-01-22 DIAGNOSIS — I10 ESSENTIAL HYPERTENSION: Chronic | ICD-10-CM

## 2020-01-22 DIAGNOSIS — J30.5 ALLERGIC RHINITIS DUE TO FOOD: ICD-10-CM

## 2020-01-22 DIAGNOSIS — T78.40XA ALLERGY, UNSPECIFIED, INITIAL ENCOUNTER: ICD-10-CM

## 2020-01-22 DIAGNOSIS — L98.9 SKIN LESIONS: ICD-10-CM

## 2020-01-22 DIAGNOSIS — Z00.00 MEDICARE ANNUAL WELLNESS VISIT, SUBSEQUENT: Primary | ICD-10-CM

## 2020-01-22 DIAGNOSIS — R19.7 NAUSEA, VOMITING AND DIARRHEA: ICD-10-CM

## 2020-01-22 DIAGNOSIS — F41.1 ANXIETY STATE: ICD-10-CM

## 2020-01-22 PROCEDURE — 36415 COLL VENOUS BLD VENIPUNCTURE: CPT

## 2020-01-22 PROCEDURE — 86003 ALLG SPEC IGE CRUDE XTRC EA: CPT

## 2020-01-22 PROCEDURE — 99214 OFFICE O/P EST MOD 30 MIN: CPT | Performed by: GENERAL PRACTICE

## 2020-01-22 PROCEDURE — 86008 ALLG SPEC IGE RECOMB EA: CPT

## 2020-01-22 PROCEDURE — G0439 PPPS, SUBSEQ VISIT: HCPCS | Performed by: GENERAL PRACTICE

## 2020-01-22 PROCEDURE — 96160 PT-FOCUSED HLTH RISK ASSMT: CPT | Performed by: GENERAL PRACTICE

## 2020-01-22 RX ORDER — ONDANSETRON 4 MG/1
4 TABLET, FILM COATED ORAL EVERY 8 HOURS PRN
COMMUNITY

## 2020-01-22 RX ORDER — DICYCLOMINE HYDROCHLORIDE 10 MG/1
10 CAPSULE ORAL 2 TIMES DAILY
COMMUNITY

## 2020-01-22 RX ORDER — HYDROXYZINE HYDROCHLORIDE 25 MG/1
25 TABLET, FILM COATED ORAL 3 TIMES DAILY PRN
Qty: 30 TABLET | Refills: 1 | Status: SHIPPED | OUTPATIENT
Start: 2020-01-22 | End: 2021-01-21

## 2020-01-22 NOTE — PATIENT INSTRUCTIONS
Floristar or Align probiotic    Check at pharmacy on Shingrix shingles vaccine    Calorie Counting for Weight Loss  Calories are units of energy. Your body needs a certain amount of calories from food to keep you going throughout the day. When you eat more calories than your body needs, your body stores the extra calories as fat. When you eat fewer calories than your body needs, your body burns fat to get the energy it needs.  Calorie counting means keeping track of how many calories you eat and drink each day. Calorie counting can be helpful if you need to lose weight. If you make sure to eat fewer calories than your body needs, you should lose weight. Ask your health care provider what a healthy weight is for you.  For calorie counting to work, you will need to eat the right number of calories in a day in order to lose a healthy amount of weight per week. A dietitian can help you determine how many calories you need in a day and will give you suggestions on how to reach your calorie goal.  · A healthy amount of weight to lose per week is usually 1-2 lb (0.5-0.9 kg). This usually means that your daily calorie intake should be reduced by 500-750 calories.  · Eating 1,200 - 1,500 calories per day can help most women lose weight.  · Eating 1,500 - 1,800 calories per day can help most men lose weight.  What is my plan?  My goal is to have __________ calories per day.  If I have this many calories per day, I should lose around __________ pounds per week.  What do I need to know about calorie counting?  In order to meet your daily calorie goal, you will need to:  · Find out how many calories are in each food you would like to eat. Try to do this before you eat.  · Decide how much of the food you plan to eat.  · Write down what you ate and how many calories it had. Doing this is called keeping a food log.  To successfully lose weight, it is important to balance calorie counting with a healthy lifestyle that includes  regular activity. Aim for 150 minutes of moderate exercise (such as walking) or 75 minutes of vigorous exercise (such as running) each week.  Where do I find calorie information?    The number of calories in a food can be found on a Nutrition Facts label. If a food does not have a Nutrition Facts label, try to look up the calories online or ask your dietitian for help.  Remember that calories are listed per serving. If you choose to have more than one serving of a food, you will have to multiply the calories per serving by the amount of servings you plan to eat. For example, the label on a package of bread might say that a serving size is 1 slice and that there are 90 calories in a serving. If you eat 1 slice, you will have eaten 90 calories. If you eat 2 slices, you will have eaten 180 calories.  How do I keep a food log?  Immediately after each meal, record the following information in your food log:  · What you ate. Don't forget to include toppings, sauces, and other extras on the food.  · How much you ate. This can be measured in cups, ounces, or number of items.  · How many calories each food and drink had.  · The total number of calories in the meal.  Keep your food log near you, such as in a small notebook in your pocket, or use a mobile sharron or website. Some programs will calculate calories for you and show you how many calories you have left for the day to meet your goal.  What are some calorie counting tips?    · Use your calories on foods and drinks that will fill you up and not leave you hungry:  ? Some examples of foods that fill you up are nuts and nut butters, vegetables, lean proteins, and high-fiber foods like whole grains. High-fiber foods are foods with more than 5 g fiber per serving.  ? Drinks such as sodas, specialty coffee drinks, alcohol, and juices have a lot of calories, yet do not fill you up.  · Eat nutritious foods and avoid empty calories. Empty calories are calories you get from foods  "or beverages that do not have many vitamins or protein, such as candy, sweets, and soda. It is better to have a nutritious high-calorie food (such as an avocado) than a food with few nutrients (such as a bag of chips).  · Know how many calories are in the foods you eat most often. This will help you calculate calorie counts faster.  · Pay attention to calories in drinks. Low-calorie drinks include water and unsweetened drinks.  · Pay attention to nutrition labels for \"low fat\" or \"fat free\" foods. These foods sometimes have the same amount of calories or more calories than the full fat versions. They also often have added sugar, starch, or salt, to make up for flavor that was removed with the fat.  · Find a way of tracking calories that works for you. Get creative. Try different apps or programs if writing down calories does not work for you.  What are some portion control tips?  · Know how many calories are in a serving. This will help you know how many servings of a certain food you can have.  · Use a measuring cup to measure serving sizes. You could also try weighing out portions on a kitchen scale. With time, you will be able to estimate serving sizes for some foods.  · Take some time to put servings of different foods on your favorite plates, bowls, and cups so you know what a serving looks like.  · Try not to eat straight from a bag or box. Doing this can lead to overeating. Put the amount you would like to eat in a cup or on a plate to make sure you are eating the right portion.  · Use smaller plates, glasses, and bowls to prevent overeating.  · Try not to multitask (for example, watch TV or use your computer) while eating. If it is time to eat, sit down at a table and enjoy your food. This will help you to know when you are full. It will also help you to be aware of what you are eating and how much you are eating.  What are tips for following this plan?  Reading food labels  · Check the calorie count " "compared to the serving size. The serving size may be smaller than what you are used to eating.  · Check the source of the calories. Make sure the food you are eating is high in vitamins and protein and low in saturated and trans fats.  Shopping  · Read nutrition labels while you shop. This will help you make healthy decisions before you decide to purchase your food.  · Make a grocery list and stick to it.  Cooking  · Try to cook your favorite foods in a healthier way. For example, try baking instead of frying.  · Use low-fat dairy products.  Meal planning  · Use more fruits and vegetables. Half of your plate should be fruits and vegetables.  · Include lean proteins like poultry and fish.  How do I count calories when eating out?  · Ask for smaller portion sizes.  · Consider sharing an entree and sides instead of getting your own entree.  · If you get your own entree, eat only half. Ask for a box at the beginning of your meal and put the rest of your entree in it so you are not tempted to eat it.  · If calories are listed on the menu, choose the lower calorie options.  · Choose dishes that include vegetables, fruits, whole grains, low-fat dairy products, and lean protein.  · Choose items that are boiled, broiled, grilled, or steamed. Stay away from items that are buttered, battered, fried, or served with cream sauce. Items labeled \"crispy\" are usually fried, unless stated otherwise.  · Choose water, low-fat milk, unsweetened iced tea, or other drinks without added sugar. If you want an alcoholic beverage, choose a lower calorie option such as a glass of wine or light beer.  · Ask for dressings, sauces, and syrups on the side. These are usually high in calories, so you should limit the amount you eat.  · If you want a salad, choose a garden salad and ask for grilled meats. Avoid extra toppings like funes, cheese, or fried items. Ask for the dressing on the side, or ask for olive oil and vinegar or lemon to use as " dressing.  · Estimate how many servings of a food you are given. For example, a serving of cooked rice is ½ cup or about the size of half a baseball. Knowing serving sizes will help you be aware of how much food you are eating at restaurants. The list below tells you how big or small some common portion sizes are based on everyday objects:  ? 1 oz--4 stacked dice.  ? 3 oz--1 deck of cards.  ? 1 tsp--1 die.  ? 1 Tbsp--½ a ping-pong ball.  ? 2 Tbsp--1 ping-pong ball.  ? ½ cup--½ baseball.  ? 1 cup--1 baseball.  Summary  · Calorie counting means keeping track of how many calories you eat and drink each day. If you eat fewer calories than your body needs, you should lose weight.  · A healthy amount of weight to lose per week is usually 1-2 lb (0.5-0.9 kg). This usually means reducing your daily calorie intake by 500-750 calories.  · The number of calories in a food can be found on a Nutrition Facts label. If a food does not have a Nutrition Facts label, try to look up the calories online or ask your dietitian for help.  · Use your calories on foods and drinks that will fill you up, and not on foods and drinks that will leave you hungry.  · Use smaller plates, glasses, and bowls to prevent overeating.  This information is not intended to replace advice given to you by your health care provider. Make sure you discuss any questions you have with your health care provider.  Document Released: 2006 Document Revised: 2019 Document Reviewed: 2017  Elsevier Interactive Patient Education © 2019 [employer]    Medicare Wellness  Personal Prevention Plan of Service     Date of Office Visit:  2020  Encounter Provider:  Peyton Stockton MD  Place of Service:  [facility]  Patient Name: Cari Brooks  :  1950    As part of the Medicare Wellness portion of your visit today, we are providing you with this personalized preventive plan of services (PPPS).  This plan is based upon recommendations of the [place] Preventive Services Task Force (USPSTF) and the Advisory Committee on Immunization Practices (ACIP).    This lists the preventive care services that should be considered, and provides dates of when you are due. Items listed as completed are up-to-date and do not require any further intervention.    Health Maintenance   Topic Date Due   • Pneumococcal Vaccine Once at 65 Years Old  12/18/2016   • ZOSTER VACCINE (2 of 2) 08/08/2017   • MEDICARE ANNUAL WELLNESS  12/14/2019   • MAMMOGRAM  07/22/2021   • TDAP/TD VACCINES (2 - Td) 06/13/2027   • COLONOSCOPY  10/30/2029   • HEPATITIS C SCREENING  Completed   • INFLUENZA VACCINE  Completed       Orders Placed This Encounter   Procedures   • Food Allergy Profile     Standing Status:   Future     Number of Occurrences:   1     Standing Expiration Date:   1/22/2021   • Alpha - Gal Panel     Standing Status:   Future     Number of Occurrences:   1     Standing Expiration Date:   1/22/2021   • Ambulatory Referral to Dermatology     Referral Priority:   Routine     Referral Type:   Consultation     Referral Reason:   Specialty Services Required     Referred to Provider:   Salabdor Cooper MD     Requested Specialty:   Dermatology     Number of Visits Requested:   1       Return in about 6 months (around 7/23/2020) for Annual physical.

## 2020-01-22 NOTE — PROGRESS NOTES
Subjective   Cari Brooks is a 69 y.o. female.   Chief Complaint   Patient presents with   • Hypertension     medicare wellness     For review and evaluation of management of chronic medical problems. Still having a lot of trouble with her stomach, nausea, vomiting and diarrhea, is seeing Dr. Angel.  Hypertension   This is a chronic problem. The current episode started more than 1 year ago. The problem is unchanged. The problem is controlled. Pertinent negatives include no chest pain, headaches, neck pain, palpitations or shortness of breath. There are no associated agents to hypertension. Current antihypertension treatment includes angiotensin blockers, diuretics and calcium channel blockers. The current treatment provides significant improvement. There are no compliance problems.    Nausea   The current episode started more than 1 year ago. The problem occurs intermittently. The problem has been gradually worsening. Associated symptoms include abdominal pain, vomiting and weakness. Pertinent negatives include no arthralgias, chest pain, chills, congestion, coughing, fatigue, fever, headaches, joint swelling, myalgias, nausea, neck pain, numbness, rash or sore throat. Associated symptoms comments: diarrhea. Nothing aggravates the symptoms. She has tried nothing for the symptoms.      The following portions of the patient's history were reviewed and updated as appropriate: allergies, current medications, past social history and problem list.    Outpatient Medications Prior to Visit   Medication Sig Dispense Refill   • amLODIPine (NORVASC) 10 MG tablet Take 1 tablet by mouth Daily. for blood pressure 90 tablet 3   • Cholecalciferol (D3-1000) 1000 units capsule Take 1,000 Units by mouth Daily. cholecalciferol (vitamin D3) 1,000 unit capsule      • dicyclomine (BENTYL) 10 MG capsule Take 10 mg by mouth 2 (Two) Times a Day.     • hydroCHLOROthiazide (HYDRODIURIL) 25 MG tablet Take 1 tablet by mouth Daily.  "90 tablet 1   • losartan (COZAAR) 100 MG tablet Take 1 tablet by mouth Daily. 90 tablet 1   • ondansetron (ZOFRAN) 4 MG tablet Take 4 mg by mouth Every 8 (Eight) Hours As Needed for Nausea or Vomiting.     • potassium chloride (MICRO-K) 8 MEQ CR capsule Take 1 capsule by mouth 2 (Two) Times a Day. 180 capsule 3   • hydrOXYzine (ATARAX) 25 MG tablet Take 1 tablet by mouth 3 (Three) Times a Day As Needed for Anxiety. 30 tablet 1   • omeprazole (priLOSEC) 20 MG capsule Take 1 capsule by mouth Daily. Prn for reflux 90 capsule 3   • potassium chloride (KLOR-CON) 8 MEQ CR tablet TAKE 1 CAPSULE TWICE DAILY 180 tablet 4     No facility-administered medications prior to visit.        Review of Systems   Constitutional: Negative for chills, fatigue, fever and unexpected weight change.   HENT: Negative.  Negative for congestion, ear pain, hearing loss, nosebleeds, rhinorrhea, sneezing, sore throat and tinnitus.    Eyes: Negative.  Negative for discharge.   Respiratory: Negative.  Negative for cough, shortness of breath and wheezing.    Cardiovascular: Negative.  Negative for chest pain and palpitations.   Gastrointestinal: Positive for abdominal pain and vomiting. Negative for constipation, diarrhea and nausea.   Endocrine: Negative.    Genitourinary: Negative.  Negative for dysuria, frequency and urgency.   Musculoskeletal: Negative.  Negative for arthralgias, back pain, joint swelling, myalgias and neck pain.   Skin: Negative.  Negative for rash.   Allergic/Immunologic: Negative.    Neurological: Positive for weakness. Negative for dizziness, numbness and headaches.   Hematological: Negative.  Negative for adenopathy.   Psychiatric/Behavioral: Negative.  Negative for dysphoric mood and sleep disturbance. The patient is not nervous/anxious.        Objective   Visit Vitals  /60   Pulse 77   Ht 167.6 cm (66\")   Wt 88.8 kg (195 lb 12.8 oz)   LMP  (LMP Unknown)   SpO2 96%   BMI 31.60 kg/m²     Physical Exam "   Constitutional: She is oriented to person, place, and time. She appears well-developed and well-nourished. No distress.   HENT:   Head: Normocephalic and atraumatic.   Nose: Nose normal.   Mouth/Throat: Oropharynx is clear and moist.   Eyes: Pupils are equal, round, and reactive to light. Conjunctivae and EOM are normal. Right eye exhibits no discharge. Left eye exhibits no discharge.   Neck: No thyromegaly present.   Cardiovascular: Normal rate, regular rhythm, normal heart sounds and intact distal pulses.   Pulmonary/Chest: Effort normal and breath sounds normal.   Abdominal: Soft. Normal appearance and bowel sounds are normal. She exhibits no distension. There is no tenderness.   Lymphadenopathy:     She has no cervical adenopathy.   Neurological: She is alert and oriented to person, place, and time.   Skin: Skin is warm and dry.   Has a lots of actinic changes and some suspicious lesions   Psychiatric: She has a normal mood and affect.   Nursing note and vitals reviewed.    Assessment/Plan   Problem List Items Addressed This Visit        Cardiovascular and Mediastinum    Essential hypertension (Chronic)       Other    Anxiety state    Relevant Medications    hydrOXYzine (ATARAX) 25 MG tablet      Other Visit Diagnoses     Medicare annual wellness visit, subsequent    -  Primary    Nausea, vomiting and diarrhea        Relevant Orders    Food Allergy Profile    Alpha - Gal Panel    Allergic rhinitis due to food         Relevant Orders    Food Allergy Profile    Allergy, unspecified, initial encounter         Relevant Orders    Alpha - Gal Panel    Skin lesions        Relevant Orders    Ambulatory Referral to Dermatology (Completed)         Will notify regarding results. Continue current treatment.     New Medications Ordered This Visit   Medications   • hydrOXYzine (ATARAX) 25 MG tablet     Sig: Take 1 tablet by mouth 3 (Three) Times a Day As Needed for Anxiety.     Dispense:  30 tablet     Refill:  1     Return  in about 6 months (around 7/23/2020) for Annual physical.

## 2020-01-23 DIAGNOSIS — Z12.31 ENCOUNTER FOR SCREENING MAMMOGRAM FOR MALIGNANT NEOPLASM OF BREAST: Primary | ICD-10-CM

## 2020-01-25 LAB
CALIF WALNUT POLN IGE QN: <0.1 KU/L
CLAM IGE QN: <0.1 KU/L
CODFISH IGE QN: <0.1 KU/L
CONV CLASS DESCRIPTION: ABNORMAL
CORN IGE QN: <0.1 KU/L
COW MILK IGE QN: 0.15 KU/L
EGG WHITE IGE QN: <0.1 KU/L
PEANUT IGE QN: <0.1 KU/L
SCALLOP IGE QN: <0.1 KU/L
SESAME SEED IGE: <0.1 KU/L
SHRIMP IGE: <0.1 KU/L
SOYBEAN IGE QN: <0.1 KU/L
WHEAT IGE QN: <0.1 KU/L

## 2020-01-27 LAB
ALPHA GAL IGE: <0.1 KU/L
BEEF IGE QN: <0.1 KU/L
LAMB IGE QN: <0.1 KU/L
Lab: 0
PORK IGE: <0.1 KU/L

## 2020-06-02 RX ORDER — LOSARTAN POTASSIUM 100 MG/1
TABLET ORAL
Qty: 90 TABLET | Refills: 0 | Status: SHIPPED | OUTPATIENT
Start: 2020-06-02 | End: 2020-07-23 | Stop reason: SDUPTHER

## 2020-06-02 RX ORDER — HYDROCHLOROTHIAZIDE 25 MG/1
TABLET ORAL
Qty: 90 TABLET | Refills: 0 | Status: SHIPPED | OUTPATIENT
Start: 2020-06-02 | End: 2020-07-23 | Stop reason: SDUPTHER

## 2020-07-14 RX ORDER — POTASSIUM CHLORIDE 600 MG/1
8 CAPSULE, EXTENDED RELEASE ORAL 2 TIMES DAILY
Qty: 180 CAPSULE | Refills: 3 | Status: SHIPPED | OUTPATIENT
Start: 2020-07-14 | End: 2021-07-26 | Stop reason: SDUPTHER

## 2020-07-21 DIAGNOSIS — I10 ESSENTIAL HYPERTENSION: Chronic | ICD-10-CM

## 2020-07-21 RX ORDER — AMLODIPINE BESYLATE 10 MG/1
10 TABLET ORAL DAILY
Qty: 90 TABLET | Refills: 3 | Status: SHIPPED | OUTPATIENT
Start: 2020-07-21 | End: 2020-07-23 | Stop reason: SDUPTHER

## 2020-07-23 ENCOUNTER — OFFICE VISIT (OUTPATIENT)
Dept: FAMILY MEDICINE CLINIC | Facility: CLINIC | Age: 70
End: 2020-07-23

## 2020-07-23 ENCOUNTER — LAB (OUTPATIENT)
Dept: LAB | Facility: HOSPITAL | Age: 70
End: 2020-07-23

## 2020-07-23 VITALS
BODY MASS INDEX: 31.43 KG/M2 | DIASTOLIC BLOOD PRESSURE: 70 MMHG | OXYGEN SATURATION: 97 % | HEART RATE: 82 BPM | HEIGHT: 66 IN | SYSTOLIC BLOOD PRESSURE: 130 MMHG | WEIGHT: 195.6 LBS

## 2020-07-23 DIAGNOSIS — Z12.31 ENCOUNTER FOR SCREENING MAMMOGRAM FOR BREAST CANCER: ICD-10-CM

## 2020-07-23 DIAGNOSIS — K21.9 GERD WITHOUT ESOPHAGITIS: Chronic | ICD-10-CM

## 2020-07-23 DIAGNOSIS — I10 ESSENTIAL HYPERTENSION: Primary | Chronic | ICD-10-CM

## 2020-07-23 DIAGNOSIS — E66.09 CLASS 1 OBESITY DUE TO EXCESS CALORIES WITH SERIOUS COMORBIDITY AND BODY MASS INDEX (BMI) OF 31.0 TO 31.9 IN ADULT: Chronic | ICD-10-CM

## 2020-07-23 DIAGNOSIS — I10 ESSENTIAL HYPERTENSION: ICD-10-CM

## 2020-07-23 LAB
ALBUMIN SERPL-MCNC: 4.7 G/DL (ref 3.5–5.2)
ALBUMIN/GLOB SERPL: 1.7 G/DL
ALP SERPL-CCNC: 82 U/L (ref 39–117)
ALT SERPL W P-5'-P-CCNC: 17 U/L (ref 1–33)
ANION GAP SERPL CALCULATED.3IONS-SCNC: 14 MMOL/L (ref 5–15)
AST SERPL-CCNC: 20 U/L (ref 1–32)
BASOPHILS # BLD AUTO: 0.07 10*3/MM3 (ref 0–0.2)
BASOPHILS NFR BLD AUTO: 0.8 % (ref 0–1.5)
BILIRUB SERPL-MCNC: 0.4 MG/DL (ref 0–1.2)
BILIRUB UR QL STRIP: NEGATIVE
BUN SERPL-MCNC: 15 MG/DL (ref 8–23)
BUN/CREAT SERPL: 15.3 (ref 7–25)
CALCIUM SPEC-SCNC: 9.8 MG/DL (ref 8.6–10.5)
CHLORIDE SERPL-SCNC: 101 MMOL/L (ref 98–107)
CHOLEST SERPL-MCNC: 215 MG/DL (ref 0–200)
CLARITY UR: CLEAR
CO2 SERPL-SCNC: 25 MMOL/L (ref 22–29)
COLOR UR: ABNORMAL
CREAT SERPL-MCNC: 0.98 MG/DL (ref 0.57–1)
DEPRECATED RDW RBC AUTO: 42.2 FL (ref 37–54)
EOSINOPHIL # BLD AUTO: 0.25 10*3/MM3 (ref 0–0.4)
EOSINOPHIL NFR BLD AUTO: 2.8 % (ref 0.3–6.2)
ERYTHROCYTE [DISTWIDTH] IN BLOOD BY AUTOMATED COUNT: 13.2 % (ref 12.3–15.4)
GFR SERPL CREATININE-BSD FRML MDRD: 56 ML/MIN/1.73
GLOBULIN UR ELPH-MCNC: 2.7 GM/DL
GLUCOSE SERPL-MCNC: 103 MG/DL (ref 65–99)
GLUCOSE UR STRIP-MCNC: NEGATIVE MG/DL
HCT VFR BLD AUTO: 44.4 % (ref 34–46.6)
HDLC SERPL-MCNC: 62 MG/DL (ref 40–60)
HGB BLD-MCNC: 15.1 G/DL (ref 12–15.9)
HGB UR QL STRIP.AUTO: NEGATIVE
IMM GRANULOCYTES # BLD AUTO: 0.02 10*3/MM3 (ref 0–0.05)
IMM GRANULOCYTES NFR BLD AUTO: 0.2 % (ref 0–0.5)
KETONES UR QL STRIP: ABNORMAL
LDLC SERPL CALC-MCNC: 129 MG/DL (ref 0–100)
LDLC/HDLC SERPL: 2.08 {RATIO}
LEUKOCYTE ESTERASE UR QL STRIP.AUTO: NEGATIVE
LYMPHOCYTES # BLD AUTO: 3.83 10*3/MM3 (ref 0.7–3.1)
LYMPHOCYTES NFR BLD AUTO: 43.3 % (ref 19.6–45.3)
MCH RBC QN AUTO: 29.7 PG (ref 26.6–33)
MCHC RBC AUTO-ENTMCNC: 34 G/DL (ref 31.5–35.7)
MCV RBC AUTO: 87.2 FL (ref 79–97)
MONOCYTES # BLD AUTO: 0.5 10*3/MM3 (ref 0.1–0.9)
MONOCYTES NFR BLD AUTO: 5.6 % (ref 5–12)
NEUTROPHILS NFR BLD AUTO: 4.18 10*3/MM3 (ref 1.7–7)
NEUTROPHILS NFR BLD AUTO: 47.3 % (ref 42.7–76)
NITRITE UR QL STRIP: NEGATIVE
NRBC BLD AUTO-RTO: 0 /100 WBC (ref 0–0.2)
PH UR STRIP.AUTO: 7 [PH] (ref 5–8)
PLATELET # BLD AUTO: 360 10*3/MM3 (ref 140–450)
PMV BLD AUTO: 10.1 FL (ref 6–12)
POTASSIUM SERPL-SCNC: 3.7 MMOL/L (ref 3.5–5.2)
PROT SERPL-MCNC: 7.4 G/DL (ref 6–8.5)
PROT UR QL STRIP: ABNORMAL
RBC # BLD AUTO: 5.09 10*6/MM3 (ref 3.77–5.28)
SODIUM SERPL-SCNC: 140 MMOL/L (ref 136–145)
SP GR UR STRIP: 1.02 (ref 1–1.03)
TRIGL SERPL-MCNC: 120 MG/DL (ref 0–150)
UROBILINOGEN UR QL STRIP: ABNORMAL
VLDLC SERPL-MCNC: 24 MG/DL
WBC # BLD AUTO: 8.85 10*3/MM3 (ref 3.4–10.8)

## 2020-07-23 PROCEDURE — 81003 URINALYSIS AUTO W/O SCOPE: CPT

## 2020-07-23 PROCEDURE — 80053 COMPREHEN METABOLIC PANEL: CPT

## 2020-07-23 PROCEDURE — 85025 COMPLETE CBC W/AUTO DIFF WBC: CPT

## 2020-07-23 PROCEDURE — 80061 LIPID PANEL: CPT

## 2020-07-23 PROCEDURE — 99214 OFFICE O/P EST MOD 30 MIN: CPT | Performed by: GENERAL PRACTICE

## 2020-07-23 PROCEDURE — 36415 COLL VENOUS BLD VENIPUNCTURE: CPT

## 2020-07-23 RX ORDER — AMLODIPINE BESYLATE 10 MG/1
10 TABLET ORAL DAILY
Qty: 90 TABLET | Refills: 3 | Status: SHIPPED | OUTPATIENT
Start: 2020-07-23 | End: 2021-07-26 | Stop reason: SDUPTHER

## 2020-07-23 RX ORDER — LOSARTAN POTASSIUM 100 MG/1
100 TABLET ORAL DAILY
Qty: 90 TABLET | Refills: 3 | Status: SHIPPED | OUTPATIENT
Start: 2020-07-23 | End: 2021-06-25 | Stop reason: SDUPTHER

## 2020-07-23 RX ORDER — HYDROCHLOROTHIAZIDE 25 MG/1
25 TABLET ORAL DAILY
Qty: 90 TABLET | Refills: 3 | Status: SHIPPED | OUTPATIENT
Start: 2020-07-23 | End: 2021-06-25 | Stop reason: SDUPTHER

## 2020-07-23 NOTE — PATIENT INSTRUCTIONS
Check at pharmacy on Shingrix shingles vaccine    Calorie Counting for Weight Loss  Calories are units of energy. Your body needs a certain amount of calories from food to keep you going throughout the day. When you eat more calories than your body needs, your body stores the extra calories as fat. When you eat fewer calories than your body needs, your body burns fat to get the energy it needs.  Calorie counting means keeping track of how many calories you eat and drink each day. Calorie counting can be helpful if you need to lose weight. If you make sure to eat fewer calories than your body needs, you should lose weight. Ask your health care provider what a healthy weight is for you.  For calorie counting to work, you will need to eat the right number of calories in a day in order to lose a healthy amount of weight per week. A dietitian can help you determine how many calories you need in a day and will give you suggestions on how to reach your calorie goal.  · A healthy amount of weight to lose per week is usually 1-2 lb (0.5-0.9 kg). This usually means that your daily calorie intake should be reduced by 500-750 calories.  · Eating 1,200 - 1,500 calories per day can help most women lose weight.  · Eating 1,500 - 1,800 calories per day can help most men lose weight.  What is my plan?  My goal is to have __________ calories per day.  If I have this many calories per day, I should lose around __________ pounds per week.  What do I need to know about calorie counting?  In order to meet your daily calorie goal, you will need to:  · Find out how many calories are in each food you would like to eat. Try to do this before you eat.  · Decide how much of the food you plan to eat.  · Write down what you ate and how many calories it had. Doing this is called keeping a food log.  To successfully lose weight, it is important to balance calorie counting with a healthy lifestyle that includes regular activity. Aim for 150 minutes  of moderate exercise (such as walking) or 75 minutes of vigorous exercise (such as running) each week.  Where do I find calorie information?    The number of calories in a food can be found on a Nutrition Facts label. If a food does not have a Nutrition Facts label, try to look up the calories online or ask your dietitian for help.  Remember that calories are listed per serving. If you choose to have more than one serving of a food, you will have to multiply the calories per serving by the amount of servings you plan to eat. For example, the label on a package of bread might say that a serving size is 1 slice and that there are 90 calories in a serving. If you eat 1 slice, you will have eaten 90 calories. If you eat 2 slices, you will have eaten 180 calories.  How do I keep a food log?  Immediately after each meal, record the following information in your food log:  · What you ate. Don't forget to include toppings, sauces, and other extras on the food.  · How much you ate. This can be measured in cups, ounces, or number of items.  · How many calories each food and drink had.  · The total number of calories in the meal.  Keep your food log near you, such as in a small notebook in your pocket, or use a mobile sharron or website. Some programs will calculate calories for you and show you how many calories you have left for the day to meet your goal.  What are some calorie counting tips?    · Use your calories on foods and drinks that will fill you up and not leave you hungry:  ? Some examples of foods that fill you up are nuts and nut butters, vegetables, lean proteins, and high-fiber foods like whole grains. High-fiber foods are foods with more than 5 g fiber per serving.  ? Drinks such as sodas, specialty coffee drinks, alcohol, and juices have a lot of calories, yet do not fill you up.  · Eat nutritious foods and avoid empty calories. Empty calories are calories you get from foods or beverages that do not have many  "vitamins or protein, such as candy, sweets, and soda. It is better to have a nutritious high-calorie food (such as an avocado) than a food with few nutrients (such as a bag of chips).  · Know how many calories are in the foods you eat most often. This will help you calculate calorie counts faster.  · Pay attention to calories in drinks. Low-calorie drinks include water and unsweetened drinks.  · Pay attention to nutrition labels for \"low fat\" or \"fat free\" foods. These foods sometimes have the same amount of calories or more calories than the full fat versions. They also often have added sugar, starch, or salt, to make up for flavor that was removed with the fat.  · Find a way of tracking calories that works for you. Get creative. Try different apps or programs if writing down calories does not work for you.  What are some portion control tips?  · Know how many calories are in a serving. This will help you know how many servings of a certain food you can have.  · Use a measuring cup to measure serving sizes. You could also try weighing out portions on a kitchen scale. With time, you will be able to estimate serving sizes for some foods.  · Take some time to put servings of different foods on your favorite plates, bowls, and cups so you know what a serving looks like.  · Try not to eat straight from a bag or box. Doing this can lead to overeating. Put the amount you would like to eat in a cup or on a plate to make sure you are eating the right portion.  · Use smaller plates, glasses, and bowls to prevent overeating.  · Try not to multitask (for example, watch TV or use your computer) while eating. If it is time to eat, sit down at a table and enjoy your food. This will help you to know when you are full. It will also help you to be aware of what you are eating and how much you are eating.  What are tips for following this plan?  Reading food labels  · Check the calorie count compared to the serving size. The serving " "size may be smaller than what you are used to eating.  · Check the source of the calories. Make sure the food you are eating is high in vitamins and protein and low in saturated and trans fats.  Shopping  · Read nutrition labels while you shop. This will help you make healthy decisions before you decide to purchase your food.  · Make a grocery list and stick to it.  Cooking  · Try to cook your favorite foods in a healthier way. For example, try baking instead of frying.  · Use low-fat dairy products.  Meal planning  · Use more fruits and vegetables. Half of your plate should be fruits and vegetables.  · Include lean proteins like poultry and fish.  How do I count calories when eating out?  · Ask for smaller portion sizes.  · Consider sharing an entree and sides instead of getting your own entree.  · If you get your own entree, eat only half. Ask for a box at the beginning of your meal and put the rest of your entree in it so you are not tempted to eat it.  · If calories are listed on the menu, choose the lower calorie options.  · Choose dishes that include vegetables, fruits, whole grains, low-fat dairy products, and lean protein.  · Choose items that are boiled, broiled, grilled, or steamed. Stay away from items that are buttered, battered, fried, or served with cream sauce. Items labeled \"crispy\" are usually fried, unless stated otherwise.  · Choose water, low-fat milk, unsweetened iced tea, or other drinks without added sugar. If you want an alcoholic beverage, choose a lower calorie option such as a glass of wine or light beer.  · Ask for dressings, sauces, and syrups on the side. These are usually high in calories, so you should limit the amount you eat.  · If you want a salad, choose a garden salad and ask for grilled meats. Avoid extra toppings like funes, cheese, or fried items. Ask for the dressing on the side, or ask for olive oil and vinegar or lemon to use as dressing.  · Estimate how many servings of a " food you are given. For example, a serving of cooked rice is ½ cup or about the size of half a baseball. Knowing serving sizes will help you be aware of how much food you are eating at restaurants. The list below tells you how big or small some common portion sizes are based on everyday objects:  ? 1 oz--4 stacked dice.  ? 3 oz--1 deck of cards.  ? 1 tsp--1 die.  ? 1 Tbsp--½ a ping-pong ball.  ? 2 Tbsp--1 ping-pong ball.  ? ½ cup--½ baseball.  ? 1 cup--1 baseball.  Summary  · Calorie counting means keeping track of how many calories you eat and drink each day. If you eat fewer calories than your body needs, you should lose weight.  · A healthy amount of weight to lose per week is usually 1-2 lb (0.5-0.9 kg). This usually means reducing your daily calorie intake by 500-750 calories.  · The number of calories in a food can be found on a Nutrition Facts label. If a food does not have a Nutrition Facts label, try to look up the calories online or ask your dietitian for help.  · Use your calories on foods and drinks that will fill you up, and not on foods and drinks that will leave you hungry.  · Use smaller plates, glasses, and bowls to prevent overeating.  This information is not intended to replace advice given to you by your health care provider. Make sure you discuss any questions you have with your health care provider.  Document Released: 12/18/2006 Document Revised: 09/06/2019 Document Reviewed: 11/17/2017  Elsevier Patient Education © 2020 [employer]

## 2020-07-23 NOTE — PROGRESS NOTES
Subjective   Cari Brooks is a 69 y.o. female.     Chief Complaint   Patient presents with   • Annual Exam     labs richy   • Med Refill     For review and evaluation of management of chronic medical problems. Labs pending. Due for mammogram. GERD symptoms are stable.  Obesity   This is a chronic problem. The current episode started more than 1 year ago. The problem occurs constantly. The problem has been gradually improving. The symptoms are aggravated by stress. Treatments tried: diet and exercise. The treatment provided moderate relief.   Hypertension   This is a chronic problem. The current episode started more than 1 year ago. The problem is unchanged. The problem is controlled. Pertinent negatives include no palpitations or shortness of breath. There are no associated agents to hypertension. Current antihypertension treatment includes angiotensin blockers, diuretics and calcium channel blockers. The current treatment provides significant improvement. There are no compliance problems.       The following portions of the patient's history were reviewed and updated as appropriate: allergies, current medications, past family and social history and problem list.    Outpatient Medications Prior to Visit   Medication Sig Dispense Refill   • Cholecalciferol (D3-1000) 1000 units capsule Take 1,000 Units by mouth Daily. cholecalciferol (vitamin D3) 1,000 unit capsule      • dicyclomine (BENTYL) 10 MG capsule Take 10 mg by mouth 2 (Two) Times a Day.     • hydrOXYzine (ATARAX) 25 MG tablet Take 1 tablet by mouth 3 (Three) Times a Day As Needed for Anxiety. 30 tablet 1   • ondansetron (ZOFRAN) 4 MG tablet Take 4 mg by mouth Every 8 (Eight) Hours As Needed for Nausea or Vomiting.     • potassium chloride (MICRO-K) 8 MEQ CR capsule Take 1 capsule by mouth 2 (Two) Times a Day. 180 capsule 3   • amLODIPine (NORVASC) 10 MG tablet Take 1 tablet by mouth Daily. for blood pressure 90 tablet 3   • hydroCHLOROthiazide  "(HYDRODIURIL) 25 MG tablet TAKE 1 TABLET  DAILY 90 tablet 0   • losartan (COZAAR) 100 MG tablet TAKE 1 TABLET DAILY FOR BLOOD PRESSURE. 90 tablet 0     No facility-administered medications prior to visit.        Review of Systems   Constitutional: Negative.  Negative for unexpected weight change.   HENT: Negative.  Negative for ear pain, hearing loss, nosebleeds, rhinorrhea, sneezing and tinnitus.    Eyes: Negative.  Negative for discharge.   Respiratory: Negative.  Negative for shortness of breath and wheezing.    Cardiovascular: Negative.  Negative for palpitations.   Gastrointestinal: Negative.  Negative for constipation and diarrhea.   Endocrine: Negative.    Genitourinary: Negative.  Negative for dysuria, frequency and urgency.   Musculoskeletal: Negative.  Negative for back pain.   Skin: Negative.    Allergic/Immunologic: Negative.    Neurological: Negative.  Negative for dizziness.   Hematological: Negative.  Negative for adenopathy.   Psychiatric/Behavioral: Negative.  Negative for dysphoric mood and sleep disturbance. The patient is not nervous/anxious.        Objective     Visit Vitals  /70   Pulse 82   Ht 167.6 cm (66\")   Wt 88.7 kg (195 lb 9.6 oz)   LMP  (LMP Unknown)   SpO2 97%   BMI 31.57 kg/m²     Physical Exam   Constitutional: She is oriented to person, place, and time. She appears well-developed and well-nourished. No distress.   HENT:   Head: Normocephalic and atraumatic.   Nose: Nose normal.   Mouth/Throat: Oropharynx is clear and moist.   Eyes: Pupils are equal, round, and reactive to light. Conjunctivae and EOM are normal. Right eye exhibits no discharge. Left eye exhibits no discharge.   Neck: No tracheal deviation present. No thyromegaly present.   Cardiovascular: Normal rate, regular rhythm, normal heart sounds and intact distal pulses.   No murmur heard.  Pulmonary/Chest: Effort normal and breath sounds normal. No respiratory distress. She has no wheezes. She has no rales. She " exhibits no tenderness. Right breast exhibits no inverted nipple, no mass, no nipple discharge, no skin change and no tenderness. Left breast exhibits no inverted nipple, no mass, no nipple discharge, no skin change and no tenderness.   Abdominal: Soft. Bowel sounds are normal. She exhibits no distension and no mass. There is no tenderness. No hernia.   Musculoskeletal: Normal range of motion. She exhibits no deformity.   Lymphadenopathy:     She has no cervical adenopathy.   Neurological: She is alert and oriented to person, place, and time. She has normal reflexes.   Skin: Skin is warm and dry.   Psychiatric: She has a normal mood and affect. Her behavior is normal. Judgment and thought content normal.   Nursing note and vitals reviewed.       Notes brought forward are reviewed and updated if indicated.     Assessment/Plan   Problem List Items Addressed This Visit        Cardiovascular and Mediastinum    Essential hypertension - Primary (Chronic)    Relevant Medications    amLODIPine (NORVASC) 10 MG tablet    hydroCHLOROthiazide (HYDRODIURIL) 25 MG tablet    losartan (COZAAR) 100 MG tablet       Digestive    GERD without esophagitis (Chronic)    Class 1 obesity due to excess calories with serious comorbidity and body mass index (BMI) of 31.0 to 31.9 in adult (Chronic)      Other Visit Diagnoses     Encounter for screening mammogram for breast cancer              Will notify regarding results. Continue current treatment. Patient's Body mass index is 31.57 kg/m². BMI is above normal parameters. Recommendations include: exercise counseling and nutrition counseling.     New Medications Ordered This Visit   Medications   • amLODIPine (NORVASC) 10 MG tablet     Sig: Take 1 tablet by mouth Daily. for blood pressure     Dispense:  90 tablet     Refill:  3   • hydroCHLOROthiazide (HYDRODIURIL) 25 MG tablet     Sig: Take 1 tablet by mouth Daily.     Dispense:  90 tablet     Refill:  3   • losartan (COZAAR) 100 MG tablet      Sig: Take 1 tablet by mouth Daily. for blood pressure     Dispense:  90 tablet     Refill:  3     Return in about 6 months (around 1/23/2021) for Recheck, medicare wellness visit.

## 2020-07-24 ENCOUNTER — TELEPHONE (OUTPATIENT)
Dept: FAMILY MEDICINE CLINIC | Facility: CLINIC | Age: 70
End: 2020-07-24

## 2020-07-24 NOTE — PROGRESS NOTES
Per Dr. Stockton, Ms. Brooks has been called with recent lab results & recommendations.  Continue current medications and follow-up as planned or sooner if any problems.

## 2020-07-24 NOTE — TELEPHONE ENCOUNTER
Per Dr. Stockton, Ms. Brooks has been called with recent lab results & recommendations.  Continue current medications and follow-up as planned or sooner if any problems.      ----- Message from Peyton Stockton MD sent at 7/24/2020  3:27 PM CDT -----  Call and tell labs stable except cholesterol is a little high, decrease fat in diet

## 2021-01-21 DIAGNOSIS — F41.1 ANXIETY STATE: ICD-10-CM

## 2021-01-21 RX ORDER — HYDROXYZINE HYDROCHLORIDE 25 MG/1
TABLET, FILM COATED ORAL
Qty: 30 TABLET | Refills: 1 | Status: SHIPPED | OUTPATIENT
Start: 2021-01-21 | End: 2021-07-26 | Stop reason: SDUPTHER

## 2021-01-25 ENCOUNTER — OFFICE VISIT (OUTPATIENT)
Dept: FAMILY MEDICINE CLINIC | Facility: CLINIC | Age: 71
End: 2021-01-25

## 2021-01-25 VITALS
SYSTOLIC BLOOD PRESSURE: 130 MMHG | HEIGHT: 66 IN | DIASTOLIC BLOOD PRESSURE: 70 MMHG | WEIGHT: 196.5 LBS | HEART RATE: 72 BPM | OXYGEN SATURATION: 97 % | BODY MASS INDEX: 31.58 KG/M2

## 2021-01-25 DIAGNOSIS — Z00.00 MEDICARE ANNUAL WELLNESS VISIT, SUBSEQUENT: Primary | ICD-10-CM

## 2021-01-25 DIAGNOSIS — Z12.31 ENCOUNTER FOR SCREENING MAMMOGRAM FOR MALIGNANT NEOPLASM OF BREAST: Primary | ICD-10-CM

## 2021-01-25 DIAGNOSIS — I10 ESSENTIAL HYPERTENSION: Chronic | ICD-10-CM

## 2021-01-25 DIAGNOSIS — Z23 NEED FOR PROPHYLACTIC VACCINATION AGAINST STREPTOCOCCUS PNEUMONIAE (PNEUMOCOCCUS): ICD-10-CM

## 2021-01-25 PROCEDURE — G0439 PPPS, SUBSEQ VISIT: HCPCS | Performed by: GENERAL PRACTICE

## 2021-01-25 PROCEDURE — 90732 PPSV23 VACC 2 YRS+ SUBQ/IM: CPT | Performed by: GENERAL PRACTICE

## 2021-01-25 PROCEDURE — 99212 OFFICE O/P EST SF 10 MIN: CPT | Performed by: GENERAL PRACTICE

## 2021-01-25 PROCEDURE — G0009 ADMIN PNEUMOCOCCAL VACCINE: HCPCS | Performed by: GENERAL PRACTICE

## 2021-01-25 PROCEDURE — 1126F AMNT PAIN NOTED NONE PRSNT: CPT | Performed by: GENERAL PRACTICE

## 2021-01-25 PROCEDURE — 1159F MED LIST DOCD IN RCRD: CPT | Performed by: GENERAL PRACTICE

## 2021-01-25 PROCEDURE — 96160 PT-FOCUSED HLTH RISK ASSMT: CPT | Performed by: GENERAL PRACTICE

## 2021-01-25 NOTE — PATIENT INSTRUCTIONS
Check at pharmacy on Shingrix shingles vaccine    Medicare Wellness  Personal Prevention Plan of Service     Date of Office Visit:  2021  Encounter Provider:  Peyton Stockton MD  Place of Service:  [facility]  Patient Name: Cari Brooks  :  1950    As part of the Medicare Wellness portion of your visit today, we are providing you with this personalized preventive plan of services (PPPS). This plan is based upon recommendations of the [place] Preventive Services Task Force (USPSTF) and the Advisory Committee on Immunization Practices (ACIP).    This lists the preventive care services that should be considered, and provides dates of when you are due. Items listed as completed are up-to-date and do not require any further intervention.    Health Maintenance   Topic Date Due   • ZOSTER VACCINE (2 of 2) 2017   • Pneumococcal Vaccine 65+ (2 of 2 - PPSV23) 2019   • ANNUAL WELLNESS VISIT  2021   • MAMMOGRAM  2022   • TDAP/TD VACCINES (2 - Td) 2027   • COLONOSCOPY  10/30/2029   • HEPATITIS C SCREENING  Completed   • INFLUENZA VACCINE  Completed   • MENINGOCOCCAL VACCINE  Aged Out   • PAP SMEAR  Discontinued       No orders of the defined types were placed in this encounter.      No follow-ups on file.

## 2021-01-25 NOTE — PROGRESS NOTES
The ABCs of the Annual Wellness Visit  Subsequent Medicare Wellness Visit    Chief Complaint   Patient presents with   • Hypertension     medicare wellness       Subjective   History of Present Illness:  Cari Brooks is a 70 y.o. female who presents for a Subsequent Medicare Wellness Visit.    HEALTH RISK ASSESSMENT    Recent Hospitalizations:  No hospitalization(s) within the last year.    Current Medical Providers:  Patient Care Team:  Peyton Stockton MD as PCP - General  Brad Arnett MD as Surgeon (General Surgery)  Jorje Delgado OD as Consulting Physician (Optometry)  Nathen Szymanski DMD as Consulting Physician (Dental General Practice)    Smoking Status:  Social History     Tobacco Use   Smoking Status Never Smoker   Smokeless Tobacco Never Used       Alcohol Consumption:  Social History     Substance and Sexual Activity   Alcohol Use No       Depression Screen:   PHQ-2/PHQ-9 Depression Screening 1/25/2021   Little interest or pleasure in doing things 0   Feeling down, depressed, or hopeless 0   Trouble falling or staying asleep, or sleeping too much 1   Feeling tired or having little energy 0   Poor appetite or overeating 0   Feeling bad about yourself - or that you are a failure or have let yourself or your family down 0   Trouble concentrating on things, such as reading the newspaper or watching television 0   Moving or speaking so slowly that other people could have noticed. Or the opposite - being so fidgety or restless that you have been moving around a lot more than usual 0   Thoughts that you would be better off dead, or of hurting yourself in some way 0   Total Score 1   If you checked off any problems, how difficult have these problems made it for you to do your work, take care of things at home, or get along with other people? Not difficult at all       Fall Risk Screen:  STEADI Fall Risk Assessment was completed, and patient is at LOW risk for falls.Assessment completed  on:1/25/2021    Health Habits and Functional and Cognitive Screening:  Functional & Cognitive Status 1/25/2021   Do you have difficulty preparing food and eating? No   Do you have difficulty bathing yourself, getting dressed or grooming yourself? No   Do you have difficulty using the toilet? No   Do you have difficulty moving around from place to place? No   Do you have trouble with steps or getting out of a bed or a chair? No   Current Diet Well Balanced Diet   Dental Exam Not up to date   Eye Exam Not up to date   Exercise (times per week) 0 times per week   Current Exercises Include No Regular Exercise   Current Exercise Activities Include -   Do you need help using the phone?  No   Are you deaf or do you have serious difficulty hearing?  No   Do you need help with transportation? No   Do you need help shopping? No   Do you need help preparing meals?  No   Do you need help with housework?  No   Do you need help with laundry? No   Do you need help taking your medications? No   Do you need help managing money? No   Do you ever drive or ride in a car without wearing a seat belt? No   Have you felt unusual stress, anger or loneliness in the last month? No   Who do you live with? Spouse   If you need help, do you have trouble finding someone available to you? No   Have you been bothered in the last four weeks by sexual problems? No   Do you have difficulty concentrating, remembering or making decisions? No         Does the patient have evidence of cognitive impairment? No    Asprin use counseling:Does not need ASA (and currently is not on it)    Age-appropriate Screening Schedule:  Refer to the list below for future screening recommendations based on patient's age, sex and/or medical conditions. Orders for these recommended tests are listed in the plan section. The patient has been provided with a written plan.    Health Maintenance   Topic Date Due   • ZOSTER VACCINE (2 of 2) 08/08/2017   • MAMMOGRAM  07/23/2022   •  TDAP/TD VACCINES (2 - Td) 06/13/2027   • COLONOSCOPY  10/30/2029   • INFLUENZA VACCINE  Completed   • PAP SMEAR  Discontinued          The following portions of the patient's history were reviewed and updated as appropriate: allergies, current medications, past family history, past medical history, past social history, past surgical history and problem list.    Outpatient Medications Prior to Visit   Medication Sig Dispense Refill   • amLODIPine (NORVASC) 10 MG tablet Take 1 tablet by mouth Daily. for blood pressure 90 tablet 3   • Cholecalciferol (D3-1000) 1000 units capsule Take 1,000 Units by mouth Daily. cholecalciferol (vitamin D3) 1,000 unit capsule      • dicyclomine (BENTYL) 10 MG capsule Take 10 mg by mouth 2 (Two) Times a Day.     • hydroCHLOROthiazide (HYDRODIURIL) 25 MG tablet Take 1 tablet by mouth Daily. 90 tablet 3   • hydrOXYzine (ATARAX) 25 MG tablet TAKE 1 TABLET THREE TIMES DAILY AS NEEDED FOR ANXIETY 30 tablet 1   • losartan (COZAAR) 100 MG tablet Take 1 tablet by mouth Daily. for blood pressure 90 tablet 3   • ondansetron (ZOFRAN) 4 MG tablet Take 4 mg by mouth Every 8 (Eight) Hours As Needed for Nausea or Vomiting.     • potassium chloride (MICRO-K) 8 MEQ CR capsule Take 1 capsule by mouth 2 (Two) Times a Day. 180 capsule 3     No facility-administered medications prior to visit.        Patient Active Problem List   Diagnosis   • Anxiety state   • Asthma   • Essential hypertension   • Vitamin D deficiency   • Osteoarthritis   • GERD without esophagitis   • Internal derangement of knee joint, left   • Left knee pain   • Class 1 obesity due to excess calories with serious comorbidity and body mass index (BMI) of 31.0 to 31.9 in adult       Advanced Care Planning:  ACP discussion was held with the patient during this visit. Patient does not have an advance directive, information provided.    Review of Systems   Constitutional: Negative.  Negative for chills, fatigue, fever and unexpected weight  "change.   HENT: Negative.  Negative for congestion, ear pain, hearing loss, nosebleeds, rhinorrhea, sneezing, sore throat and tinnitus.    Eyes: Negative.  Negative for discharge.   Respiratory: Negative.  Negative for cough, shortness of breath and wheezing.    Cardiovascular: Negative.  Negative for chest pain and palpitations.   Gastrointestinal: Negative.  Negative for abdominal pain, constipation, diarrhea, nausea and vomiting.   Endocrine: Negative.    Genitourinary: Negative.  Negative for dysuria, frequency and urgency.   Musculoskeletal: Negative.  Negative for arthralgias, back pain, joint swelling, myalgias and neck pain.   Skin: Negative.  Negative for rash.   Allergic/Immunologic: Negative.    Neurological: Negative.  Negative for dizziness, weakness, numbness and headaches.   Hematological: Negative.  Negative for adenopathy.   Psychiatric/Behavioral: Negative.  Negative for dysphoric mood and sleep disturbance. The patient is not nervous/anxious.        Compared to one year ago, the patient feels her physical health is the same.  Compared to one year ago, the patient feels her mental health is the same.    Reviewed chart for potential of high risk medication in the elderly: yes  Reviewed chart for potential of harmful drug interactions in the elderly:not applicable    Objective         Vitals:    01/25/21 0950   BP: 130/70   Pulse: 72   SpO2: 97%   Weight: 89.1 kg (196 lb 8 oz)   Height: 167.6 cm (66\")   PainSc: 0-No pain       Body mass index is 31.72 kg/m².  Discussed the patient's BMI with her. The BMI is above average; BMI management plan is completed.    Physical Exam  Vitals signs and nursing note reviewed.   Constitutional:       General: She is not in acute distress.     Appearance: She is well-developed.   HENT:      Head: Normocephalic and atraumatic.      Nose: Nose normal.   Eyes:      General:         Right eye: No discharge.         Left eye: No discharge.      Conjunctiva/sclera: " Conjunctivae normal.      Pupils: Pupils are equal, round, and reactive to light.   Neck:      Thyroid: No thyromegaly.   Cardiovascular:      Rate and Rhythm: Normal rate and regular rhythm.      Heart sounds: Normal heart sounds.   Pulmonary:      Effort: Pulmonary effort is normal.      Breath sounds: Normal breath sounds.   Lymphadenopathy:      Cervical: No cervical adenopathy.   Skin:     General: Skin is warm and dry.   Neurological:      Mental Status: She is alert and oriented to person, place, and time.               Assessment/Plan   Medicare Risks and Personalized Health Plan  CMS Preventative Services Quick Reference  Advance Directive Discussion  Fall Risk  Immunizations Discussed/Encouraged (specific immunizations; Pneumococcal 23, Shingrix and covid )  Obesity/Overweight     The above risks/problems have been discussed with the patient.  Pertinent information has been shared with the patient in the After Visit Summary.  Follow up plans and orders are seen below in the Assessment/Plan Section.    Diagnoses and all orders for this visit:    1. Medicare annual wellness visit, subsequent (Primary)    2. Need for prophylactic vaccination against Streptococcus pneumoniae (pneumococcus)  -     Pneumococcal Polysaccharide Vaccine 23-Valent Greater Than or Equal To 1yo Subcutaneous / IM    3. Essential hypertension  -     CBC & Differential; Future  -     Comprehensive Metabolic Panel; Future  -     Lipid Panel; Future  -     Urinalysis With Culture If Indicated -; Future      Follow Up:  Return in about 6 months (around 7/25/2021) for Annual physical.     An After Visit Summary and PPPS were given to the patient.    Information has been scanned into chart. Discussed importance of taking medications as prescribed. Encouraged healthy eating habits with low fat, low salt choices and working towards maintaining a healthy weight. Recommended regular exercise if able as well as care to prevent falls including  avoiding anything on the floor that they could slip or trip on such as throw rugs, making sure they have a bathmat to step onto when their feet are wet and having grab bars and railings where needed.

## 2021-01-25 NOTE — PROGRESS NOTES
Subjective   Cari Brooks is a 70 y.o. female.   Chief Complaint   Patient presents with   • Hypertension     medicare wellness     For review and evaluation of management of chronic medical problems.   Hypertension  This is a chronic problem. The current episode started more than 1 year ago. The problem is unchanged. The problem is controlled. Pertinent negatives include no palpitations or shortness of breath. There are no associated agents to hypertension. Current antihypertension treatment includes angiotensin blockers, diuretics and calcium channel blockers. The current treatment provides significant improvement. There are no compliance problems.       The following portions of the patient's history were reviewed and updated as appropriate: allergies, current medications, past social history and problem list.    Outpatient Medications Prior to Visit   Medication Sig Dispense Refill   • amLODIPine (NORVASC) 10 MG tablet Take 1 tablet by mouth Daily. for blood pressure 90 tablet 3   • Cholecalciferol (D3-1000) 1000 units capsule Take 1,000 Units by mouth Daily. cholecalciferol (vitamin D3) 1,000 unit capsule      • dicyclomine (BENTYL) 10 MG capsule Take 10 mg by mouth 2 (Two) Times a Day.     • hydroCHLOROthiazide (HYDRODIURIL) 25 MG tablet Take 1 tablet by mouth Daily. 90 tablet 3   • hydrOXYzine (ATARAX) 25 MG tablet TAKE 1 TABLET THREE TIMES DAILY AS NEEDED FOR ANXIETY 30 tablet 1   • losartan (COZAAR) 100 MG tablet Take 1 tablet by mouth Daily. for blood pressure 90 tablet 3   • ondansetron (ZOFRAN) 4 MG tablet Take 4 mg by mouth Every 8 (Eight) Hours As Needed for Nausea or Vomiting.     • potassium chloride (MICRO-K) 8 MEQ CR capsule Take 1 capsule by mouth 2 (Two) Times a Day. 180 capsule 3     No facility-administered medications prior to visit.      I have reviewed 12 systems with patient. Findings were negative except what is noted below and/or in history of present illness.   Review of  "Systems   Respiratory: Negative for shortness of breath.    Cardiovascular: Negative for palpitations.       Objective   Visit Vitals  /70   Pulse 72   Ht 167.6 cm (66\")   Wt 89.1 kg (196 lb 8 oz)   LMP  (LMP Unknown)   SpO2 97%   BMI 31.72 kg/m²     Physical Exam  Vitals signs and nursing note reviewed.   Constitutional:       General: She is not in acute distress.     Appearance: She is well-developed.   HENT:      Head: Normocephalic and atraumatic.      Nose: Nose normal.   Eyes:      General:         Right eye: No discharge.         Left eye: No discharge.      Conjunctiva/sclera: Conjunctivae normal.      Pupils: Pupils are equal, round, and reactive to light.   Neck:      Thyroid: No thyromegaly.   Cardiovascular:      Rate and Rhythm: Normal rate and regular rhythm.      Heart sounds: Normal heart sounds.   Pulmonary:      Effort: Pulmonary effort is normal.      Breath sounds: Normal breath sounds.   Lymphadenopathy:      Cervical: No cervical adenopathy.   Skin:     General: Skin is warm and dry.   Neurological:      Mental Status: She is alert and oriented to person, place, and time.         Notes brought forward are reviewed and updated if indicated.     Assessment/Plan   Problems Addressed this Visit        Cardiac and Vasculature    Essential hypertension (Chronic)    Relevant Orders    CBC & Differential    Comprehensive Metabolic Panel    Lipid Panel    Urinalysis With Culture If Indicated -      Other Visit Diagnoses     Medicare annual wellness visit, subsequent    -  Primary    Need for prophylactic vaccination against Streptococcus pneumoniae (pneumococcus)        Relevant Orders    Pneumococcal Polysaccharide Vaccine 23-Valent Greater Than or Equal To 1yo Subcutaneous / IM (Completed)      Diagnoses       Codes Comments    Medicare annual wellness visit, subsequent    -  Primary ICD-10-CM: Z00.00  ICD-9-CM: V70.0     Need for prophylactic vaccination against Streptococcus pneumoniae " (pneumococcus)     ICD-10-CM: Z23  ICD-9-CM: V03.82     Essential hypertension     ICD-10-CM: I10  ICD-9-CM: 401.9           Continue current treatment.     No orders of the defined types were placed in this encounter.    Return in about 6 months (around 7/25/2021) for Annual physical.

## 2021-03-03 ENCOUNTER — IMMUNIZATION (OUTPATIENT)
Dept: VACCINE CLINIC | Facility: HOSPITAL | Age: 71
End: 2021-03-03

## 2021-03-03 PROCEDURE — 91300 HC SARSCOV02 VAC 30MCG/0.3ML IM: CPT | Performed by: THORACIC SURGERY (CARDIOTHORACIC VASCULAR SURGERY)

## 2021-03-03 PROCEDURE — 0001A: CPT | Performed by: THORACIC SURGERY (CARDIOTHORACIC VASCULAR SURGERY)

## 2021-03-23 ENCOUNTER — TELEPHONE (OUTPATIENT)
Dept: FAMILY MEDICINE CLINIC | Facility: CLINIC | Age: 71
End: 2021-03-23

## 2021-03-23 NOTE — TELEPHONE ENCOUNTER
Advised that the best thing to do would be to wait 2 weeks until the shingles lesions have healed and then proceed with getting her second vaccine at that time.

## 2021-03-23 NOTE — TELEPHONE ENCOUNTER
Pt called to say she has been diagnosed with shingles.    Is scheduled to take 2nd COVID vaccine tomorrow. Please call pt to advise regarding this situation.    [phone]

## 2021-03-24 ENCOUNTER — APPOINTMENT (OUTPATIENT)
Dept: VACCINE CLINIC | Facility: HOSPITAL | Age: 71
End: 2021-03-24

## 2021-04-07 ENCOUNTER — IMMUNIZATION (OUTPATIENT)
Dept: VACCINE CLINIC | Facility: HOSPITAL | Age: 71
End: 2021-04-07

## 2021-04-07 PROCEDURE — 0002A: CPT | Performed by: NURSE PRACTITIONER

## 2021-04-07 PROCEDURE — 91300 HC SARSCOV02 VAC 30MCG/0.3ML IM: CPT | Performed by: NURSE PRACTITIONER

## 2021-06-25 RX ORDER — HYDROCHLOROTHIAZIDE 25 MG/1
25 TABLET ORAL DAILY
Qty: 90 TABLET | Refills: 0 | Status: SHIPPED | OUTPATIENT
Start: 2021-06-25 | End: 2021-07-26 | Stop reason: SDUPTHER

## 2021-06-25 RX ORDER — LOSARTAN POTASSIUM 100 MG/1
100 TABLET ORAL DAILY
Qty: 90 TABLET | Refills: 0 | Status: SHIPPED | OUTPATIENT
Start: 2021-06-25 | End: 2021-07-26 | Stop reason: SDUPTHER

## 2021-07-23 ENCOUNTER — LAB (OUTPATIENT)
Dept: LAB | Facility: HOSPITAL | Age: 71
End: 2021-07-23

## 2021-07-23 DIAGNOSIS — I10 ESSENTIAL HYPERTENSION: ICD-10-CM

## 2021-07-23 LAB
ALBUMIN SERPL-MCNC: 3.9 G/DL (ref 3.5–5.2)
ALBUMIN/GLOB SERPL: 1.4 G/DL
ALP SERPL-CCNC: 77 U/L (ref 39–117)
ALT SERPL W P-5'-P-CCNC: 15 U/L (ref 1–33)
ANION GAP SERPL CALCULATED.3IONS-SCNC: 9.1 MMOL/L (ref 5–15)
AST SERPL-CCNC: 17 U/L (ref 1–32)
BACTERIA UR QL AUTO: ABNORMAL /HPF
BASOPHILS # BLD AUTO: 0.06 10*3/MM3 (ref 0–0.2)
BASOPHILS NFR BLD AUTO: 0.7 % (ref 0–1.5)
BILIRUB SERPL-MCNC: 0.3 MG/DL (ref 0–1.2)
BILIRUB UR QL STRIP: NEGATIVE
BUN SERPL-MCNC: 16 MG/DL (ref 8–23)
BUN/CREAT SERPL: 16.5 (ref 7–25)
CALCIUM SPEC-SCNC: 9.4 MG/DL (ref 8.6–10.5)
CHLORIDE SERPL-SCNC: 102 MMOL/L (ref 98–107)
CHOLEST SERPL-MCNC: 186 MG/DL (ref 0–200)
CLARITY UR: ABNORMAL
CO2 SERPL-SCNC: 28.9 MMOL/L (ref 22–29)
COD CRY URNS QL: ABNORMAL /HPF
COLOR UR: ABNORMAL
CREAT SERPL-MCNC: 0.97 MG/DL (ref 0.57–1)
DEPRECATED RDW RBC AUTO: 41.8 FL (ref 37–54)
EOSINOPHIL # BLD AUTO: 0.23 10*3/MM3 (ref 0–0.4)
EOSINOPHIL NFR BLD AUTO: 2.7 % (ref 0.3–6.2)
ERYTHROCYTE [DISTWIDTH] IN BLOOD BY AUTOMATED COUNT: 12.9 % (ref 12.3–15.4)
GFR SERPL CREATININE-BSD FRML MDRD: 57 ML/MIN/1.73
GLOBULIN UR ELPH-MCNC: 2.8 GM/DL
GLUCOSE SERPL-MCNC: 83 MG/DL (ref 65–99)
GLUCOSE UR STRIP-MCNC: NEGATIVE MG/DL
HCT VFR BLD AUTO: 42 % (ref 34–46.6)
HDLC SERPL-MCNC: 52 MG/DL (ref 40–60)
HGB BLD-MCNC: 14.1 G/DL (ref 12–15.9)
HGB UR QL STRIP.AUTO: NEGATIVE
HYALINE CASTS UR QL AUTO: ABNORMAL /LPF
IMM GRANULOCYTES # BLD AUTO: 0.02 10*3/MM3 (ref 0–0.05)
IMM GRANULOCYTES NFR BLD AUTO: 0.2 % (ref 0–0.5)
KETONES UR QL STRIP: ABNORMAL
LDLC SERPL CALC-MCNC: 117 MG/DL (ref 0–100)
LDLC/HDLC SERPL: 2.21 {RATIO}
LEUKOCYTE ESTERASE UR QL STRIP.AUTO: ABNORMAL
LYMPHOCYTES # BLD AUTO: 3.14 10*3/MM3 (ref 0.7–3.1)
LYMPHOCYTES NFR BLD AUTO: 37.3 % (ref 19.6–45.3)
MCH RBC QN AUTO: 29.7 PG (ref 26.6–33)
MCHC RBC AUTO-ENTMCNC: 33.6 G/DL (ref 31.5–35.7)
MCV RBC AUTO: 88.6 FL (ref 79–97)
MONOCYTES # BLD AUTO: 0.5 10*3/MM3 (ref 0.1–0.9)
MONOCYTES NFR BLD AUTO: 5.9 % (ref 5–12)
NEUTROPHILS NFR BLD AUTO: 4.46 10*3/MM3 (ref 1.7–7)
NEUTROPHILS NFR BLD AUTO: 53.2 % (ref 42.7–76)
NITRITE UR QL STRIP: NEGATIVE
NRBC BLD AUTO-RTO: 0 /100 WBC (ref 0–0.2)
PH UR STRIP.AUTO: 6 [PH] (ref 5–8)
PLATELET # BLD AUTO: 306 10*3/MM3 (ref 140–450)
PMV BLD AUTO: 9.9 FL (ref 6–12)
POTASSIUM SERPL-SCNC: 3.5 MMOL/L (ref 3.5–5.2)
PROT SERPL-MCNC: 6.7 G/DL (ref 6–8.5)
PROT UR QL STRIP: ABNORMAL
RBC # BLD AUTO: 4.74 10*6/MM3 (ref 3.77–5.28)
RBC # UR: ABNORMAL /HPF
REF LAB TEST METHOD: ABNORMAL
SODIUM SERPL-SCNC: 140 MMOL/L (ref 136–145)
SP GR UR STRIP: 1.03 (ref 1–1.03)
SQUAMOUS #/AREA URNS HPF: ABNORMAL /HPF
TRIGL SERPL-MCNC: 96 MG/DL (ref 0–150)
UROBILINOGEN UR QL STRIP: ABNORMAL
VLDLC SERPL-MCNC: 17 MG/DL (ref 5–40)
WBC # BLD AUTO: 8.41 10*3/MM3 (ref 3.4–10.8)
WBC UR QL AUTO: ABNORMAL /HPF

## 2021-07-23 PROCEDURE — 81001 URINALYSIS AUTO W/SCOPE: CPT

## 2021-07-23 PROCEDURE — 80053 COMPREHEN METABOLIC PANEL: CPT

## 2021-07-23 PROCEDURE — 36415 COLL VENOUS BLD VENIPUNCTURE: CPT

## 2021-07-23 PROCEDURE — 85025 COMPLETE CBC W/AUTO DIFF WBC: CPT

## 2021-07-23 PROCEDURE — 80061 LIPID PANEL: CPT

## 2021-07-26 ENCOUNTER — OFFICE VISIT (OUTPATIENT)
Dept: FAMILY MEDICINE CLINIC | Facility: CLINIC | Age: 71
End: 2021-07-26

## 2021-07-26 VITALS
HEART RATE: 74 BPM | OXYGEN SATURATION: 98 % | SYSTOLIC BLOOD PRESSURE: 120 MMHG | DIASTOLIC BLOOD PRESSURE: 70 MMHG | BODY MASS INDEX: 31.72 KG/M2 | WEIGHT: 197.4 LBS | HEIGHT: 66 IN

## 2021-07-26 DIAGNOSIS — F41.1 ANXIETY STATE: ICD-10-CM

## 2021-07-26 DIAGNOSIS — Z12.31 ENCOUNTER FOR SCREENING MAMMOGRAM FOR MALIGNANT NEOPLASM OF BREAST: ICD-10-CM

## 2021-07-26 DIAGNOSIS — I10 ESSENTIAL HYPERTENSION: Primary | Chronic | ICD-10-CM

## 2021-07-26 PROCEDURE — 99213 OFFICE O/P EST LOW 20 MIN: CPT | Performed by: GENERAL PRACTICE

## 2021-07-26 RX ORDER — POTASSIUM CHLORIDE 600 MG/1
8 CAPSULE, EXTENDED RELEASE ORAL 2 TIMES DAILY
Qty: 180 CAPSULE | Refills: 3 | Status: SHIPPED | OUTPATIENT
Start: 2021-07-26 | End: 2022-08-16 | Stop reason: SDUPTHER

## 2021-07-26 RX ORDER — HYDROCHLOROTHIAZIDE 25 MG/1
25 TABLET ORAL DAILY
Qty: 90 TABLET | Refills: 1 | Status: SHIPPED | OUTPATIENT
Start: 2021-07-26 | End: 2022-01-26 | Stop reason: SDUPTHER

## 2021-07-26 RX ORDER — LOSARTAN POTASSIUM 100 MG/1
100 TABLET ORAL DAILY
Qty: 90 TABLET | Refills: 1 | Status: SHIPPED | OUTPATIENT
Start: 2021-07-26 | End: 2022-01-26 | Stop reason: SDUPTHER

## 2021-07-26 RX ORDER — AMLODIPINE BESYLATE 10 MG/1
10 TABLET ORAL DAILY
Qty: 90 TABLET | Refills: 1 | Status: SHIPPED | OUTPATIENT
Start: 2021-07-26 | End: 2022-01-26 | Stop reason: SDUPTHER

## 2021-07-26 RX ORDER — HYDROXYZINE HYDROCHLORIDE 25 MG/1
25 TABLET, FILM COATED ORAL 3 TIMES DAILY PRN
Qty: 30 TABLET | Refills: 1 | Status: SHIPPED | OUTPATIENT
Start: 2021-07-26 | End: 2022-08-16 | Stop reason: SDUPTHER

## 2021-07-26 NOTE — PROGRESS NOTES
Subjective   Cari Brooks is a 70 y.o. female.     Chief Complaint   Patient presents with   • Annual Exam     labs richy   • Hypertension     For review and evaluation of management of chronic medical problems. Records reviewed. Recent labs, xrays reviewed and medications reconciled.  Due for mammogram.  Anxiety is stable. Occasionally uses hydroxyzine.   Hypertension  This is a chronic problem. The current episode started more than 1 year ago. The problem is unchanged. The problem is controlled. Pertinent negatives include no palpitations or shortness of breath. There are no associated agents to hypertension. Current antihypertension treatment includes angiotensin blockers, diuretics and calcium channel blockers. The current treatment provides significant improvement. There are no compliance problems.       The following portions of the patient's history were reviewed and updated as appropriate: allergies, current medications, past family and social history and problem list.    Outpatient Medications Prior to Visit   Medication Sig Dispense Refill   • Cholecalciferol (D3-1000) 1000 units capsule Take 1,000 Units by mouth Daily. cholecalciferol (vitamin D3) 1,000 unit capsule      • dicyclomine (BENTYL) 10 MG capsule Take 10 mg by mouth 2 (Two) Times a Day.     • ondansetron (ZOFRAN) 4 MG tablet Take 4 mg by mouth Every 8 (Eight) Hours As Needed for Nausea or Vomiting.     • amLODIPine (NORVASC) 10 MG tablet Take 1 tablet by mouth Daily. for blood pressure 90 tablet 3   • hydroCHLOROthiazide (HYDRODIURIL) 25 MG tablet Take 1 tablet by mouth Daily. 90 tablet 0   • hydrOXYzine (ATARAX) 25 MG tablet TAKE 1 TABLET THREE TIMES DAILY AS NEEDED FOR ANXIETY 30 tablet 1   • losartan (COZAAR) 100 MG tablet Take 1 tablet by mouth Daily. for blood pressure 90 tablet 0   • potassium chloride (MICRO-K) 8 MEQ CR capsule Take 1 capsule by mouth 2 (Two) Times a Day. 180 capsule 3   • azithromycin (ZITHROMAX) 250 MG  "tablet Take 2 tablets the first day, then 1 tablet daily for 4 days. 6 tablet 0     No facility-administered medications prior to visit.     I have reviewed 12 systems with patient. Findings were negative except what is noted below and/or in history of present illness.    Review of Systems   Respiratory: Negative for shortness of breath.    Cardiovascular: Negative for palpitations.       Objective     Visit Vitals  /70   Pulse 74   Ht 167.6 cm (66\")   Wt 89.5 kg (197 lb 6.4 oz)   LMP  (LMP Unknown)   SpO2 98%   BMI 31.86 kg/m²     Physical Exam  Vitals and nursing note reviewed.   Constitutional:       General: She is not in acute distress.     Appearance: She is well-developed.   HENT:      Head: Normocephalic and atraumatic.      Nose: Nose normal.   Eyes:      General:         Right eye: No discharge.         Left eye: No discharge.      Conjunctiva/sclera: Conjunctivae normal.      Pupils: Pupils are equal, round, and reactive to light.   Neck:      Thyroid: No thyromegaly.      Trachea: No tracheal deviation.   Cardiovascular:      Rate and Rhythm: Normal rate and regular rhythm.      Heart sounds: Normal heart sounds. No murmur heard.     Pulmonary:      Effort: Pulmonary effort is normal. No respiratory distress.      Breath sounds: Normal breath sounds. No wheezing or rales.   Chest:      Chest wall: No tenderness.      Breasts:         Right: No inverted nipple, mass, nipple discharge, skin change or tenderness.         Left: No inverted nipple, mass, nipple discharge, skin change or tenderness.   Abdominal:      General: Bowel sounds are normal. There is no distension.      Palpations: Abdomen is soft. There is no mass.      Tenderness: There is no abdominal tenderness.      Hernia: No hernia is present.   Musculoskeletal:         General: No deformity. Normal range of motion.   Lymphadenopathy:      Cervical: No cervical adenopathy.   Skin:     General: Skin is warm and dry.   Neurological:      " Mental Status: She is alert and oriented to person, place, and time.      Deep Tendon Reflexes: Reflexes are normal and symmetric.   Psychiatric:         Behavior: Behavior normal.         Thought Content: Thought content normal.         Judgment: Judgment normal.       Results for orders placed or performed in visit on 07/23/21   Comprehensive Metabolic Panel    Specimen: Blood   Result Value Ref Range    Glucose 83 65 - 99 mg/dL    BUN 16 8 - 23 mg/dL    Creatinine 0.97 0.57 - 1.00 mg/dL    Sodium 140 136 - 145 mmol/L    Potassium 3.5 3.5 - 5.2 mmol/L    Chloride 102 98 - 107 mmol/L    CO2 28.9 22.0 - 29.0 mmol/L    Calcium 9.4 8.6 - 10.5 mg/dL    Total Protein 6.7 6.0 - 8.5 g/dL    Albumin 3.90 3.50 - 5.20 g/dL    ALT (SGPT) 15 1 - 33 U/L    AST (SGOT) 17 1 - 32 U/L    Alkaline Phosphatase 77 39 - 117 U/L    Total Bilirubin 0.3 0.0 - 1.2 mg/dL    eGFR Non African Amer 57 (L) >60 mL/min/1.73    Globulin 2.8 gm/dL    A/G Ratio 1.4 g/dL    BUN/Creatinine Ratio 16.5 7.0 - 25.0    Anion Gap 9.1 5.0 - 15.0 mmol/L   Lipid Panel    Specimen: Blood   Result Value Ref Range    Total Cholesterol 186 0 - 200 mg/dL    Triglycerides 96 0 - 150 mg/dL    HDL Cholesterol 52 40 - 60 mg/dL    LDL Cholesterol  117 (H) 0 - 100 mg/dL    VLDL Cholesterol 17 5 - 40 mg/dL    LDL/HDL Ratio 2.21    CBC Auto Differential    Specimen: Blood   Result Value Ref Range    WBC 8.41 3.40 - 10.80 10*3/mm3    RBC 4.74 3.77 - 5.28 10*6/mm3    Hemoglobin 14.1 12.0 - 15.9 g/dL    Hematocrit 42.0 34.0 - 46.6 %    MCV 88.6 79.0 - 97.0 fL    MCH 29.7 26.6 - 33.0 pg    MCHC 33.6 31.5 - 35.7 g/dL    RDW 12.9 12.3 - 15.4 %    RDW-SD 41.8 37.0 - 54.0 fl    MPV 9.9 6.0 - 12.0 fL    Platelets 306 140 - 450 10*3/mm3    Neutrophil % 53.2 42.7 - 76.0 %    Lymphocyte % 37.3 19.6 - 45.3 %    Monocyte % 5.9 5.0 - 12.0 %    Eosinophil % 2.7 0.3 - 6.2 %    Basophil % 0.7 0.0 - 1.5 %    Immature Grans % 0.2 0.0 - 0.5 %    Neutrophils, Absolute 4.46 1.70 - 7.00 10*3/mm3     Lymphocytes, Absolute 3.14 (H) 0.70 - 3.10 10*3/mm3    Monocytes, Absolute 0.50 0.10 - 0.90 10*3/mm3    Eosinophils, Absolute 0.23 0.00 - 0.40 10*3/mm3    Basophils, Absolute 0.06 0.00 - 0.20 10*3/mm3    Immature Grans, Absolute 0.02 0.00 - 0.05 10*3/mm3    nRBC 0.0 0.0 - 0.2 /100 WBC   Urinalysis With Culture If Indicated - Urine, Clean Catch    Specimen: Urine, Clean Catch   Result Value Ref Range    Color, UA Dark Yellow (A) Yellow, Straw    Appearance, UA Cloudy (A) Clear    pH, UA 6.0 5.0 - 8.0    Specific Gravity, UA 1.027 1.005 - 1.030    Glucose, UA Negative Negative    Ketones, UA Trace (A) Negative    Bilirubin, UA Negative Negative    Blood, UA Negative Negative    Protein, UA Trace (A) Negative    Leuk Esterase, UA Small (1+) (A) Negative    Nitrite, UA Negative Negative    Urobilinogen, UA 1.0 E.U./dL 0.2 - 1.0 E.U./dL   Urinalysis, Microscopic Only - Urine, Clean Catch    Specimen: Urine, Clean Catch   Result Value Ref Range    RBC, UA None Seen None Seen, 0-2 /HPF    WBC, UA 0-2 None Seen, 0-2 /HPF    Bacteria, UA Trace (A) None Seen /HPF    Squamous Epithelial Cells, UA None Seen None Seen, 0-2 /HPF    Hyaline Casts, UA None Seen None Seen /LPF    Calcium Oxalate Crystals, UA Small/1+ None Seen /HPF    Methodology Manual Light Microscopy       Notes brought forward are reviewed and updated if indicated.     Assessment/Plan   Problems Addressed this Visit        Cardiac and Vasculature    Essential hypertension - Primary (Chronic)    Relevant Medications    amLODIPine (NORVASC) 10 MG tablet    losartan (COZAAR) 100 MG tablet    potassium chloride (MICRO-K) 8 MEQ CR capsule    hydroCHLOROthiazide (HYDRODIURIL) 25 MG tablet       Mental Health    Anxiety state    Relevant Medications    hydrOXYzine (ATARAX) 25 MG tablet      Other Visit Diagnoses     Encounter for screening mammogram for malignant neoplasm of breast          Diagnoses       Codes Comments    Essential hypertension    -  Primary  ICD-10-CM: I10  ICD-9-CM: 401.9     Anxiety state     ICD-10-CM: F41.1  ICD-9-CM: 300.00     Encounter for screening mammogram for malignant neoplasm of breast     ICD-10-CM: Z12.31  ICD-9-CM: V76.12           Will notify regarding results. Continue current treatment.     New Medications Ordered This Visit   Medications   • amLODIPine (NORVASC) 10 MG tablet     Sig: Take 1 tablet by mouth Daily. for blood pressure     Dispense:  90 tablet     Refill:  1   • losartan (COZAAR) 100 MG tablet     Sig: Take 1 tablet by mouth Daily. for blood pressure     Dispense:  90 tablet     Refill:  1   • potassium chloride (MICRO-K) 8 MEQ CR capsule     Sig: Take 1 capsule by mouth 2 (Two) Times a Day.     Dispense:  180 capsule     Refill:  3   • hydrOXYzine (ATARAX) 25 MG tablet     Sig: Take 1 tablet by mouth 3 (Three) Times a Day As Needed for Anxiety.     Dispense:  30 tablet     Refill:  1   • hydroCHLOROthiazide (HYDRODIURIL) 25 MG tablet     Sig: Take 1 tablet by mouth Daily.     Dispense:  90 tablet     Refill:  1     Return in about 6 months (around 1/26/2022) for medicare wellness visit.        This document has been electronically signed by Peyton Stockton MD on July 26, 2021 13:27 CDT

## 2022-01-26 ENCOUNTER — OFFICE VISIT (OUTPATIENT)
Dept: FAMILY MEDICINE CLINIC | Facility: CLINIC | Age: 72
End: 2022-01-26

## 2022-01-26 VITALS
DIASTOLIC BLOOD PRESSURE: 70 MMHG | WEIGHT: 199 LBS | BODY MASS INDEX: 31.98 KG/M2 | SYSTOLIC BLOOD PRESSURE: 132 MMHG | HEIGHT: 66 IN | OXYGEN SATURATION: 99 % | HEART RATE: 81 BPM

## 2022-01-26 DIAGNOSIS — Z00.00 MEDICARE ANNUAL WELLNESS VISIT, SUBSEQUENT: Primary | ICD-10-CM

## 2022-01-26 DIAGNOSIS — E55.9 VITAMIN D DEFICIENCY: ICD-10-CM

## 2022-01-26 DIAGNOSIS — I10 ESSENTIAL HYPERTENSION: Chronic | ICD-10-CM

## 2022-01-26 PROCEDURE — 96160 PT-FOCUSED HLTH RISK ASSMT: CPT | Performed by: GENERAL PRACTICE

## 2022-01-26 PROCEDURE — G0439 PPPS, SUBSEQ VISIT: HCPCS | Performed by: GENERAL PRACTICE

## 2022-01-26 PROCEDURE — 1159F MED LIST DOCD IN RCRD: CPT | Performed by: GENERAL PRACTICE

## 2022-01-26 PROCEDURE — 1126F AMNT PAIN NOTED NONE PRSNT: CPT | Performed by: GENERAL PRACTICE

## 2022-01-26 PROCEDURE — 1170F FXNL STATUS ASSESSED: CPT | Performed by: GENERAL PRACTICE

## 2022-01-26 RX ORDER — LOSARTAN POTASSIUM 100 MG/1
100 TABLET ORAL DAILY
Qty: 90 TABLET | Refills: 1 | Status: SHIPPED | OUTPATIENT
Start: 2022-01-26 | End: 2022-08-16 | Stop reason: SDUPTHER

## 2022-01-26 RX ORDER — HYDROCHLOROTHIAZIDE 25 MG/1
25 TABLET ORAL DAILY
Qty: 90 TABLET | Refills: 1 | Status: SHIPPED | OUTPATIENT
Start: 2022-01-26 | End: 2022-08-16 | Stop reason: SDUPTHER

## 2022-01-26 RX ORDER — AMLODIPINE BESYLATE 10 MG/1
10 TABLET ORAL DAILY
Qty: 90 TABLET | Refills: 1 | Status: SHIPPED | OUTPATIENT
Start: 2022-01-26 | End: 2022-08-16 | Stop reason: SDUPTHER

## 2022-01-26 NOTE — PROGRESS NOTES
The ABCs of the Annual Wellness Visit  Subsequent Medicare Wellness Visit    Chief Complaint   Patient presents with   • Medicare Wellness-subsequent      Subjective    History of Present Illness:  Cari Brooks is a 71 y.o. female who presents for a Subsequent Medicare Wellness Visit.    The following portions of the patient's history were reviewed and   updated as appropriate: allergies, current medications, past family history, past medical history, past social history, past surgical history and problem list.    Compared to one year ago, the patient feels her physical   health is the same.    Compared to one year ago, the patient feels her mental   health is the same.    Recent Hospitalizations:  She was not admitted to the hospital during the last year.       Current Medical Providers:  Patient Care Team:  Peyton Stockton MD as PCP - General  Brad Arnett MD as Surgeon (General Surgery)  Jorje Delgado OD as Consulting Physician (Optometry)  Nathen Szymanski DMD as Consulting Physician (Dental General Practice)    Outpatient Medications Prior to Visit   Medication Sig Dispense Refill   • Cholecalciferol (D3-1000) 1000 units capsule Take 1,000 Units by mouth Daily. cholecalciferol (vitamin D3) 1,000 unit capsule      • dicyclomine (BENTYL) 10 MG capsule Take 10 mg by mouth 2 (Two) Times a Day.     • hydrOXYzine (ATARAX) 25 MG tablet Take 1 tablet by mouth 3 (Three) Times a Day As Needed for Anxiety. 30 tablet 1   • ondansetron (ZOFRAN) 4 MG tablet Take 4 mg by mouth Every 8 (Eight) Hours As Needed for Nausea or Vomiting.     • potassium chloride (MICRO-K) 8 MEQ CR capsule Take 1 capsule by mouth 2 (Two) Times a Day. 180 capsule 3   • amLODIPine (NORVASC) 10 MG tablet Take 1 tablet by mouth Daily. for blood pressure 90 tablet 1   • hydroCHLOROthiazide (HYDRODIURIL) 25 MG tablet Take 1 tablet by mouth Daily. 90 tablet 1   • losartan (COZAAR) 100 MG tablet Take 1 tablet by mouth Daily. for blood  "pressure 90 tablet 1     No facility-administered medications prior to visit.       No opioid medication identified on active medication list. I have reviewed chart for other potential  high risk medication/s and harmful drug interactions in the elderly.          Aspirin is not on active medication list.  Aspirin use is not indicated based on review of current medical condition/s. Risk of harm outweighs potential benefits.  .    Patient Active Problem List   Diagnosis   • Anxiety state   • Asthma   • Essential hypertension   • Vitamin D deficiency   • Osteoarthritis   • GERD without esophagitis   • Internal derangement of knee joint, left   • Left knee pain   • Class 1 obesity due to excess calories with serious comorbidity and body mass index (BMI) of 31.0 to 31.9 in adult     Advance Care Planning  Advance Directive is not on file.  ACP discussion was held with the patient during this visit. Patient does not have an advance directive, information provided.          Objective    Vitals:    01/26/22 1104   BP: 132/70   Pulse: 81   SpO2: 99%   Weight: 90.3 kg (199 lb)   Height: 167.6 cm (66\")   PainSc: 0-No pain     BMI Readings from Last 1 Encounters:   01/26/22 32.12 kg/m²   BMI is above normal parameters. Recommendations include: exercise counseling and nutrition counseling    Does the patient have evidence of cognitive impairment? No    Physical Exam  Vitals and nursing note reviewed.   Constitutional:       General: She is not in acute distress.     Appearance: She is well-developed.   HENT:      Head: Normocephalic and atraumatic.      Nose: Nose normal.   Eyes:      General:         Right eye: No discharge.         Left eye: No discharge.      Conjunctiva/sclera: Conjunctivae normal.      Pupils: Pupils are equal, round, and reactive to light.   Neck:      Thyroid: No thyromegaly.   Cardiovascular:      Rate and Rhythm: Normal rate and regular rhythm.      Heart sounds: Normal heart sounds.   Pulmonary:      " Effort: Pulmonary effort is normal.      Breath sounds: Normal breath sounds.   Lymphadenopathy:      Cervical: No cervical adenopathy.   Skin:     General: Skin is warm and dry.   Neurological:      Mental Status: She is alert and oriented to person, place, and time.                 HEALTH RISK ASSESSMENT    Smoking Status:  Social History     Tobacco Use   Smoking Status Never Smoker   Smokeless Tobacco Never Used     Alcohol Consumption:  Social History     Substance and Sexual Activity   Alcohol Use No     Fall Risk Screen:    TARAH Fall Risk Assessment was completed, and patient is at LOW risk for falls.Assessment completed on:1/26/2022    Depression Screening:  PHQ-2/PHQ-9 Depression Screening 1/26/2022   Little interest or pleasure in doing things 0   Feeling down, depressed, or hopeless 0   Trouble falling or staying asleep, or sleeping too much 0   Feeling tired or having little energy 0   Poor appetite or overeating 0   Feeling bad about yourself - or that you are a failure or have let yourself or your family down 0   Trouble concentrating on things, such as reading the newspaper or watching television 0   Moving or speaking so slowly that other people could have noticed. Or the opposite - being so fidgety or restless that you have been moving around a lot more than usual 0   Thoughts that you would be better off dead, or of hurting yourself in some way 0   Total Score 0   If you checked off any problems, how difficult have these problems made it for you to do your work, take care of things at home, or get along with other people? Not difficult at all       Health Habits and Functional and Cognitive Screening:  Functional & Cognitive Status 1/26/2022   Do you have difficulty preparing food and eating? No   Do you have difficulty bathing yourself, getting dressed or grooming yourself? No   Do you have difficulty using the toilet? No   Do you have difficulty moving around from place to place? No   Do you  have trouble with steps or getting out of a bed or a chair? No   Current Diet Well Balanced Diet   Dental Exam Up to date   Eye Exam Up to date   Exercise (times per week) 3 times per week   Current Exercises Include Pilates;Walking   Current Exercise Activities Include -   Do you need help using the phone?  No   Are you deaf or do you have serious difficulty hearing?  No   Do you need help with transportation? No   Do you need help shopping? No   Do you need help preparing meals?  No   Do you need help with housework?  No   Do you need help with laundry? No   Do you need help taking your medications? No   Do you need help managing money? No   Do you ever drive or ride in a car without wearing a seat belt? No   Have you felt unusual stress, anger or loneliness in the last month? No   Who do you live with? Spouse   If you need help, do you have trouble finding someone available to you? No   Have you been bothered in the last four weeks by sexual problems? No   Do you have difficulty concentrating, remembering or making decisions? No       Age-appropriate Screening Schedule:  Refer to the list below for future screening recommendations based on patient's age, sex and/or medical conditions. Orders for these recommended tests are listed in the plan section. The patient has been provided with a written plan.    Health Maintenance   Topic Date Due   • ZOSTER VACCINE (2 of 2) 08/08/2017   • DXA SCAN  07/22/2021   • MAMMOGRAM  07/26/2023   • TDAP/TD VACCINES (3 - Td or Tdap) 06/13/2027   • INFLUENZA VACCINE  Completed   • PAP SMEAR  Discontinued              Assessment/Plan   CMS Preventative Services Quick Reference  Risk Factors Identified During Encounter  Immunizations Discussed/Encouraged (specific Immunizations; Shingrix  Obesity/Overweight   The above risks/problems have been discussed with the patient.  Follow up actions/plans if indicated are seen below in the Assessment/Plan Section.  Pertinent information has  been shared with the patient in the After Visit Summary.    Diagnoses and all orders for this visit:    1. Medicare annual wellness visit, subsequent (Primary)    2. Essential hypertension  -     amLODIPine (NORVASC) 10 MG tablet; Take 1 tablet by mouth Daily. for blood pressure  Dispense: 90 tablet; Refill: 1  -     hydroCHLOROthiazide (HYDRODIURIL) 25 MG tablet; Take 1 tablet by mouth Daily.  Dispense: 90 tablet; Refill: 1  -     losartan (COZAAR) 100 MG tablet; Take 1 tablet by mouth Daily. for blood pressure  Dispense: 90 tablet; Refill: 1  -     CBC & Differential; Future  -     Comprehensive Metabolic Panel; Future  -     Lipid Panel; Future  -     Urinalysis With Culture If Indicated -; Future    3. Vitamin D deficiency  -     Vitamin D 25 Hydroxy; Future        Follow Up:   Return in about 26 weeks (around 7/27/2022) for Annual physical.     An After Visit Summary and PPPS were made available to the patient.  Information has been scanned into chart. Discussed importance of taking medications as prescribed. Encouraged healthy eating habits with low fat, low salt choices and working towards maintaining a healthy weight. Recommended regular exercise if able as well as care to prevent falls including avoiding anything on the floor that they could slip or trip on such as throw rugs, making sure they have a bathmat to step onto when their feet are wet and having grab bars and railings where needed.    {Optional Chart Navigation Links Wrapup  Review (Popup)  Advance Care Planning  Labs  CC  Problem List  Visit Diagnosis  Medications  Result Review  Imaging  Health Maintenance  Quality  BestPractice  SmartSets  SnapShot  Encounters  Notes  Media  Procedures :23}

## 2022-01-26 NOTE — PATIENT INSTRUCTIONS
Check at pharmacy on Shingrix shingles vaccine    Medicare Wellness  Personal Prevention Plan of Service     Date of Office Visit:  2022  Encounter Provider:  Peyton Stockton MD  Place of Service:  [facility] [place]  Patient Name: Cari Brooks  :  1950    As part of the Medicare Wellness portion of your visit today, we are providing you with this personalized preventive plan of services (PPPS). This plan is based upon recommendations of the [place] Preventive Services Task Force (USPSTF) and the Advisory Committee on Immunization Practices (ACIP).    This lists the preventive care services that should be considered, and provides dates of when you are due. Items listed as completed are up-to-date and do not require any further intervention.    Health Maintenance   Topic Date Due   • ZOSTER VACCINE (2 of 2) 2017   • DXA SCAN  2021   • ANNUAL WELLNESS VISIT  2022   • MAMMOGRAM  2023   • TDAP/TD VACCINES (3 - Td or Tdap) 2027   • COLORECTAL CANCER SCREENING  10/30/2029   • HEPATITIS C SCREENING  Completed   • COVID-19 Vaccine  Completed   • INFLUENZA VACCINE  Completed   • Pneumococcal Vaccine 65+  Completed   • PAP SMEAR  Discontinued       No orders of the defined types were placed in this encounter.      Return in about 26 weeks (around 2022) for Annual physical.          Calorie Counting for Weight Loss  Calories are units of energy. Your body needs a certain number of calories from food to keep going throughout the day. When you eat or drink more calories than your body needs, your body stores the extra calories mostly as fat. When you eat or drink fewer calories than your body needs, your body burns fat to get the energy it needs.  Calorie counting means keeping track of how many calories you eat and drink each day. Calorie counting can be helpful if you need to lose weight. If you eat fewer  calories than your body needs, you should lose weight. Ask your health care provider what a healthy weight is for you.  For calorie counting to work, you will need to eat the right number of calories each day to lose a healthy amount of weight per week. A dietitian can help you figure out how many calories you need in a day and will suggest ways to reach your calorie goal.  · A healthy amount of weight to lose each week is usually 1-2 lb (0.5-0.9 kg). This usually means that your daily calorie intake should be reduced by 500-750 calories.  · Eating 1,200-1,500 calories a day can help most women lose weight.  · Eating 1,500-1,800 calories a day can help most men lose weight.  What do I need to know about calorie counting?  Work with your health care provider or dietitian to determine how many calories you should get each day. To meet your daily calorie goal, you will need to:  · Find out how many calories are in each food that you would like to eat. Try to do this before you eat.  · Decide how much of the food you plan to eat.  · Keep a food log. Do this by writing down what you ate and how many calories it had.  To successfully lose weight, it is important to balance calorie counting with a healthy lifestyle that includes regular activity.  Where do I find calorie information?    The number of calories in a food can be found on a Nutrition Facts label. If a food does not have a Nutrition Facts label, try to look up the calories online or ask your dietitian for help.  Remember that calories are listed per serving. If you choose to have more than one serving of a food, you will have to multiply the calories per serving by the number of servings you plan to eat. For example, the label on a package of bread might say that a serving size is 1 slice and that there are 90 calories in a serving. If you eat 1 slice, you will have eaten 90 calories. If you eat 2 slices, you will have eaten 180 calories.  How do I keep a food  log?  After each time that you eat, record the following in your food log as soon as possible:  · What you ate. Be sure to include toppings, sauces, and other extras on the food.  · How much you ate. This can be measured in cups, ounces, or number of items.  · How many calories were in each food and drink.  · The total number of calories in the food you ate.  Keep your food log near you, such as in a pocket-sized notebook or on an sharron or website on your mobile phone. Some programs will calculate calories for you and show you how many calories you have left to meet your daily goal.  What are some portion-control tips?  · Know how many calories are in a serving. This will help you know how many servings you can have of a certain food.  · Use a measuring cup to measure serving sizes. You could also try weighing out portions on a kitchen scale. With time, you will be able to estimate serving sizes for some foods.  · Take time to put servings of different foods on your favorite plates or in your favorite bowls and cups so you know what a serving looks like.  · Try not to eat straight from a food's packaging, such as from a bag or box. Eating straight from the package makes it hard to see how much you are eating and can lead to overeating. Put the amount you would like to eat in a cup or on a plate to make sure you are eating the right portion.  · Use smaller plates, glasses, and bowls for smaller portions and to prevent overeating.  · Try not to multitask. For example, avoid watching TV or using your computer while eating. If it is time to eat, sit down at a table and enjoy your food. This will help you recognize when you are full. It will also help you be more mindful of what and how much you are eating.  What are tips for following this plan?  Reading food labels  · Check the calorie count compared with the serving size. The serving size may be smaller than what you are used to eating.  · Check the source of the  calories. Try to choose foods that are high in protein, fiber, and vitamins, and low in saturated fat, trans fat, and sodium.  Shopping  · Read nutrition labels while you shop. This will help you make healthy decisions about which foods to buy.  · Pay attention to nutrition labels for low-fat or fat-free foods. These foods sometimes have the same number of calories or more calories than the full-fat versions. They also often have added sugar, starch, or salt to make up for flavor that was removed with the fat.  · Make a grocery list of lower-calorie foods and stick to it.  Cooking  · Try to cook your favorite foods in a healthier way. For example, try baking instead of frying.  · Use low-fat dairy products.  Meal planning  · Use more fruits and vegetables. One-half of your plate should be fruits and vegetables.  · Include lean proteins, such as chicken, turkey, and fish.  Lifestyle  Each week, aim to do one of the following:  · 150 minutes of moderate exercise, such as walking.  · 75 minutes of vigorous exercise, such as running.  General information  · Know how many calories are in the foods you eat most often. This will help you calculate calorie counts faster.  · Find a way of tracking calories that works for you. Get creative. Try different apps or programs if writing down calories does not work for you.  What foods should I eat?    · Eat nutritious foods. It is better to have a nutritious, high-calorie food, such as an avocado, than a food with few nutrients, such as a bag of potato chips.  · Use your calories on foods and drinks that will fill you up and will not leave you hungry soon after eating.  ? Examples of foods that fill you up are nuts and nut butters, vegetables, lean proteins, and high-fiber foods such as whole grains. High-fiber foods are foods with more than 5 g of fiber per serving.  · Pay attention to calories in drinks. Low-calorie drinks include water and unsweetened drinks.  The items listed  above may not be a complete list of foods and beverages you can eat. Contact a dietitian for more information.  What foods should I limit?  Limit foods or drinks that are not good sources of vitamins, minerals, or protein or that are high in unhealthy fats. These include:  · Candy.  · Other sweets.  · Sodas, specialty coffee drinks, alcohol, and juice.  The items listed above may not be a complete list of foods and beverages you should avoid. Contact a dietitian for more information.  How do I count calories when eating out?  · Pay attention to portions. Often, portions are much larger when eating out. Try these tips to keep portions smaller:  ? Consider sharing a meal instead of getting your own.  ? If you get your own meal, eat only half of it. Before you start eating, ask for a container and put half of your meal into it.  ? When available, consider ordering smaller portions from the menu instead of full portions.  · Pay attention to your food and drink choices. Knowing the way food is cooked and what is included with the meal can help you eat fewer calories.  ? If calories are listed on the menu, choose the lower-calorie options.  ? Choose dishes that include vegetables, fruits, whole grains, low-fat dairy products, and lean proteins.  ? Choose items that are boiled, broiled, grilled, or steamed. Avoid items that are buttered, battered, fried, or served with cream sauce. Items labeled as crispy are usually fried, unless stated otherwise.  ? Choose water, low-fat milk, unsweetened iced tea, or other drinks without added sugar. If you want an alcoholic beverage, choose a lower-calorie option, such as a glass of wine or light beer.  ? Ask for dressings, sauces, and syrups on the side. These are usually high in calories, so you should limit the amount you eat.  ? If you want a salad, choose a garden salad and ask for grilled meats. Avoid extra toppings such as funes, cheese, or fried items. Ask for the dressing on  the side, or ask for olive oil and vinegar or lemon to use as dressing.  · Estimate how many servings of a food you are given. Knowing serving sizes will help you be aware of how much food you are eating at restaurants.  Where to find more information  · Centers for Disease Control and Prevention: www.cdc.gov  · [place] Department of Agriculture: myplate.gov  Summary  · Calorie counting means keeping track of how many calories you eat and drink each day. If you eat fewer calories than your body needs, you should lose weight.  · A healthy amount of weight to lose per week is usually 1-2 lb (0.5-0.9 kg). This usually means reducing your daily calorie intake by 500-750 calories.  · The number of calories in a food can be found on a Nutrition Facts label. If a food does not have a Nutrition Facts label, try to look up the calories online or ask your dietitian for help.  · Use smaller plates, glasses, and bowls for smaller portions and to prevent overeating.  · Use your calories on foods and drinks that will fill you up and not leave you hungry shortly after a meal.  This information is not intended to replace advice given to you by your health care provider. Make sure you discuss any questions you have with your health care provider.  Document Revised: 01/28/2021 Document Reviewed: 01/28/2021  [employer]vier Patient Education © 2021 [employer]

## 2022-03-03 DIAGNOSIS — Z78.0 POST-MENOPAUSAL: Primary | ICD-10-CM

## 2022-03-03 DIAGNOSIS — Z12.31 ENCOUNTER FOR SCREENING MAMMOGRAM FOR MALIGNANT NEOPLASM OF BREAST: ICD-10-CM

## 2022-08-15 ENCOUNTER — TELEPHONE (OUTPATIENT)
Dept: FAMILY MEDICINE CLINIC | Facility: CLINIC | Age: 72
End: 2022-08-15

## 2022-08-15 ENCOUNTER — LAB (OUTPATIENT)
Dept: LAB | Facility: HOSPITAL | Age: 72
End: 2022-08-15

## 2022-08-15 DIAGNOSIS — I10 ESSENTIAL HYPERTENSION: ICD-10-CM

## 2022-08-15 DIAGNOSIS — I10 ESSENTIAL HYPERTENSION: Primary | ICD-10-CM

## 2022-08-15 DIAGNOSIS — E55.9 VITAMIN D DEFICIENCY: ICD-10-CM

## 2022-08-15 LAB
25(OH)D3 SERPL-MCNC: 50.4 NG/ML (ref 30–100)
ALBUMIN SERPL-MCNC: 4.3 G/DL (ref 3.5–5.2)
ALBUMIN/GLOB SERPL: 1.6 G/DL
ALP SERPL-CCNC: 82 U/L (ref 39–117)
ALT SERPL W P-5'-P-CCNC: 18 U/L (ref 1–33)
ANION GAP SERPL CALCULATED.3IONS-SCNC: 11.1 MMOL/L (ref 5–15)
AST SERPL-CCNC: 19 U/L (ref 1–32)
BASOPHILS # BLD AUTO: 0.06 10*3/MM3 (ref 0–0.2)
BASOPHILS NFR BLD AUTO: 0.9 % (ref 0–1.5)
BILIRUB SERPL-MCNC: 0.5 MG/DL (ref 0–1.2)
BILIRUB UR QL STRIP: NEGATIVE
BUN SERPL-MCNC: 16 MG/DL (ref 8–23)
BUN/CREAT SERPL: 19 (ref 7–25)
CALCIUM SPEC-SCNC: 10.1 MG/DL (ref 8.6–10.5)
CHLORIDE SERPL-SCNC: 102 MMOL/L (ref 98–107)
CHOLEST SERPL-MCNC: 199 MG/DL (ref 0–200)
CLARITY UR: CLEAR
CO2 SERPL-SCNC: 26.9 MMOL/L (ref 22–29)
COLOR UR: YELLOW
CREAT SERPL-MCNC: 0.84 MG/DL (ref 0.57–1)
DEPRECATED RDW RBC AUTO: 40.3 FL (ref 37–54)
EGFRCR SERPLBLD CKD-EPI 2021: 74.9 ML/MIN/1.73
EOSINOPHIL # BLD AUTO: 0.27 10*3/MM3 (ref 0–0.4)
EOSINOPHIL NFR BLD AUTO: 3.9 % (ref 0.3–6.2)
ERYTHROCYTE [DISTWIDTH] IN BLOOD BY AUTOMATED COUNT: 13.1 % (ref 12.3–15.4)
GLOBULIN UR ELPH-MCNC: 2.7 GM/DL
GLUCOSE SERPL-MCNC: 83 MG/DL (ref 65–99)
GLUCOSE UR STRIP-MCNC: NEGATIVE MG/DL
HCT VFR BLD AUTO: 39 % (ref 34–46.6)
HDLC SERPL-MCNC: 56 MG/DL (ref 40–60)
HGB BLD-MCNC: 13.2 G/DL (ref 12–15.9)
HGB UR QL STRIP.AUTO: NEGATIVE
IMM GRANULOCYTES # BLD AUTO: 0.02 10*3/MM3 (ref 0–0.05)
IMM GRANULOCYTES NFR BLD AUTO: 0.3 % (ref 0–0.5)
KETONES UR QL STRIP: NEGATIVE
LDLC SERPL CALC-MCNC: 118 MG/DL (ref 0–100)
LDLC/HDLC SERPL: 2.04 {RATIO}
LEUKOCYTE ESTERASE UR QL STRIP.AUTO: NEGATIVE
LYMPHOCYTES # BLD AUTO: 2.72 10*3/MM3 (ref 0.7–3.1)
LYMPHOCYTES NFR BLD AUTO: 39.2 % (ref 19.6–45.3)
MCH RBC QN AUTO: 29.3 PG (ref 26.6–33)
MCHC RBC AUTO-ENTMCNC: 33.8 G/DL (ref 31.5–35.7)
MCV RBC AUTO: 86.7 FL (ref 79–97)
MONOCYTES # BLD AUTO: 0.45 10*3/MM3 (ref 0.1–0.9)
MONOCYTES NFR BLD AUTO: 6.5 % (ref 5–12)
NEUTROPHILS NFR BLD AUTO: 3.41 10*3/MM3 (ref 1.7–7)
NEUTROPHILS NFR BLD AUTO: 49.2 % (ref 42.7–76)
NITRITE UR QL STRIP: NEGATIVE
NRBC BLD AUTO-RTO: 0 /100 WBC (ref 0–0.2)
PH UR STRIP.AUTO: 6 [PH] (ref 5–8)
PLATELET # BLD AUTO: 289 10*3/MM3 (ref 140–450)
PMV BLD AUTO: 10.2 FL (ref 6–12)
POTASSIUM SERPL-SCNC: 3.8 MMOL/L (ref 3.5–5.2)
PROT SERPL-MCNC: 7 G/DL (ref 6–8.5)
PROT UR QL STRIP: NEGATIVE
RBC # BLD AUTO: 4.5 10*6/MM3 (ref 3.77–5.28)
SODIUM SERPL-SCNC: 140 MMOL/L (ref 136–145)
SP GR UR STRIP: 1.02 (ref 1–1.03)
TRIGL SERPL-MCNC: 144 MG/DL (ref 0–150)
UROBILINOGEN UR QL STRIP: NORMAL
VLDLC SERPL-MCNC: 25 MG/DL (ref 5–40)
WBC NRBC COR # BLD: 6.93 10*3/MM3 (ref 3.4–10.8)

## 2022-08-15 PROCEDURE — 80061 LIPID PANEL: CPT

## 2022-08-15 PROCEDURE — 81003 URINALYSIS AUTO W/O SCOPE: CPT

## 2022-08-15 PROCEDURE — 80053 COMPREHEN METABOLIC PANEL: CPT

## 2022-08-15 PROCEDURE — 82306 VITAMIN D 25 HYDROXY: CPT

## 2022-08-15 PROCEDURE — 85025 COMPLETE CBC W/AUTO DIFF WBC: CPT

## 2022-08-15 PROCEDURE — 36415 COLL VENOUS BLD VENIPUNCTURE: CPT

## 2022-08-16 ENCOUNTER — OFFICE VISIT (OUTPATIENT)
Dept: FAMILY MEDICINE CLINIC | Facility: CLINIC | Age: 72
End: 2022-08-16

## 2022-08-16 VITALS
RESPIRATION RATE: 18 BRPM | HEART RATE: 69 BPM | HEIGHT: 66 IN | BODY MASS INDEX: 32.05 KG/M2 | DIASTOLIC BLOOD PRESSURE: 64 MMHG | WEIGHT: 199.4 LBS | SYSTOLIC BLOOD PRESSURE: 132 MMHG | OXYGEN SATURATION: 98 %

## 2022-08-16 DIAGNOSIS — I10 ESSENTIAL HYPERTENSION: Primary | Chronic | ICD-10-CM

## 2022-08-16 DIAGNOSIS — Z12.31 ENCOUNTER FOR SCREENING MAMMOGRAM FOR MALIGNANT NEOPLASM OF BREAST: ICD-10-CM

## 2022-08-16 DIAGNOSIS — F41.1 ANXIETY STATE: ICD-10-CM

## 2022-08-16 DIAGNOSIS — F32.9 REACTIVE DEPRESSION: ICD-10-CM

## 2022-08-16 PROCEDURE — 99213 OFFICE O/P EST LOW 20 MIN: CPT | Performed by: GENERAL PRACTICE

## 2022-08-16 RX ORDER — AMLODIPINE BESYLATE 10 MG/1
10 TABLET ORAL DAILY
Qty: 90 TABLET | Refills: 1 | Status: SHIPPED | OUTPATIENT
Start: 2022-08-16

## 2022-08-16 RX ORDER — LOSARTAN POTASSIUM 100 MG/1
100 TABLET ORAL DAILY
Qty: 90 TABLET | Refills: 1 | Status: SHIPPED | OUTPATIENT
Start: 2022-08-16

## 2022-08-16 RX ORDER — ESCITALOPRAM OXALATE 10 MG/1
10 TABLET ORAL DAILY
Qty: 30 TABLET | Refills: 2 | Status: SHIPPED | OUTPATIENT
Start: 2022-08-16 | End: 2022-11-16 | Stop reason: SDUPTHER

## 2022-08-16 RX ORDER — HYDROXYZINE HYDROCHLORIDE 25 MG/1
25 TABLET, FILM COATED ORAL 3 TIMES DAILY PRN
Qty: 30 TABLET | Refills: 1 | Status: SHIPPED | OUTPATIENT
Start: 2022-08-16

## 2022-08-16 RX ORDER — HYDROCHLOROTHIAZIDE 25 MG/1
25 TABLET ORAL DAILY
Qty: 90 TABLET | Refills: 1 | Status: SHIPPED | OUTPATIENT
Start: 2022-08-16

## 2022-08-16 RX ORDER — POTASSIUM CHLORIDE 600 MG/1
8 CAPSULE, EXTENDED RELEASE ORAL 2 TIMES DAILY
Qty: 180 CAPSULE | Refills: 3 | Status: SHIPPED | OUTPATIENT
Start: 2022-08-16

## 2022-08-16 NOTE — PROGRESS NOTES
Subjective   Cari Brooks is a 71 y.o. female.     Chief Complaint   Patient presents with   • Annual Exam     M & D today      For review and evaluation of management of chronic medical problems. Records reviewed. Any recent labs, xrays reviewed and medications reconciled. Due for mammogram and bone density. Is stressed anxious and depressed as her  is unwell.   Hypertension  This is a chronic problem. The current episode started more than 1 year ago. The problem is unchanged. The problem is controlled. Pertinent negatives include no palpitations or shortness of breath. There are no associated agents to hypertension. Current antihypertension treatment includes angiotensin blockers, diuretics and calcium channel blockers. The current treatment provides significant improvement. There are no compliance problems.       The following portions of the patient's history were reviewed and updated as appropriate: allergies, current medications, past family and social history and problem list.    Outpatient Medications Prior to Visit   Medication Sig Dispense Refill   • Cholecalciferol (D3-1000) 1000 units capsule Take 1,000 Units by mouth Daily. cholecalciferol (vitamin D3) 1,000 unit capsule      • dicyclomine (BENTYL) 10 MG capsule Take 10 mg by mouth 2 (Two) Times a Day.     • ondansetron (ZOFRAN) 4 MG tablet Take 4 mg by mouth Every 8 (Eight) Hours As Needed for Nausea or Vomiting.     • amLODIPine (NORVASC) 10 MG tablet Take 1 tablet by mouth Daily. for blood pressure 90 tablet 1   • hydroCHLOROthiazide (HYDRODIURIL) 25 MG tablet Take 1 tablet by mouth Daily. 90 tablet 1   • hydrOXYzine (ATARAX) 25 MG tablet Take 1 tablet by mouth 3 (Three) Times a Day As Needed for Anxiety. 30 tablet 1   • losartan (COZAAR) 100 MG tablet Take 1 tablet by mouth Daily. for blood pressure 90 tablet 1   • potassium chloride (MICRO-K) 8 MEQ CR capsule Take 1 capsule by mouth 2 (Two) Times a Day. 180 capsule 3     No  "facility-administered medications prior to visit.       Review of Systems   Respiratory: Negative for shortness of breath.    Cardiovascular: Negative for palpitations.     I have reviewed 12 systems with patient. Findings were negative except what is noted below and/or in history of present illness.    Objective     Visit Vitals  /64   Pulse 69   Resp 18   Ht 167.6 cm (66\")   Wt 90.4 kg (199 lb 6.4 oz)   LMP  (LMP Unknown)   SpO2 98%   BMI 32.18 kg/m²     Physical Exam  Vitals and nursing note reviewed.   Constitutional:       General: She is not in acute distress.     Appearance: She is well-developed.   HENT:      Head: Normocephalic and atraumatic.      Nose: Nose normal.   Eyes:      General:         Right eye: No discharge.         Left eye: No discharge.      Conjunctiva/sclera: Conjunctivae normal.      Pupils: Pupils are equal, round, and reactive to light.   Neck:      Thyroid: No thyromegaly.      Trachea: No tracheal deviation.   Cardiovascular:      Rate and Rhythm: Normal rate and regular rhythm.      Heart sounds: Normal heart sounds. No murmur heard.  Pulmonary:      Effort: Pulmonary effort is normal. No respiratory distress.      Breath sounds: Normal breath sounds. No wheezing or rales.   Chest:      Chest wall: No tenderness.   Breasts:      Right: No inverted nipple, mass, nipple discharge, skin change or tenderness.      Left: No inverted nipple, mass, nipple discharge, skin change or tenderness.       Abdominal:      General: Bowel sounds are normal. There is no distension.      Palpations: Abdomen is soft. There is no mass.      Tenderness: There is no abdominal tenderness.      Hernia: No hernia is present.   Musculoskeletal:         General: No deformity. Normal range of motion.   Lymphadenopathy:      Cervical: No cervical adenopathy.   Skin:     General: Skin is warm and dry.   Neurological:      Mental Status: She is alert and oriented to person, place, and time.      Deep Tendon " Reflexes: Reflexes are normal and symmetric.   Psychiatric:         Mood and Affect: Affect is tearful.         Behavior: Behavior normal.         Thought Content: Thought content normal.         Judgment: Judgment normal.       Results for orders placed or performed in visit on 08/15/22   Urinalysis With Culture If Indicated - Urine, Clean Catch    Specimen: Urine, Clean Catch   Result Value Ref Range    Color, UA Yellow Yellow, Straw    Appearance, UA Clear Clear    pH, UA 6.0 5.0 - 8.0    Specific Gravity, UA 1.020 1.005 - 1.030    Glucose, UA Negative Negative    Ketones, UA Negative Negative    Bilirubin, UA Negative Negative    Blood, UA Negative Negative    Protein, UA Negative Negative    Leuk Esterase, UA Negative Negative    Nitrite, UA Negative Negative    Urobilinogen, UA 0.2 E.U./dL 0.2 - 1.0 E.U./dL   Lipid Panel    Specimen: Blood   Result Value Ref Range    Total Cholesterol 199 0 - 200 mg/dL    Triglycerides 144 0 - 150 mg/dL    HDL Cholesterol 56 40 - 60 mg/dL    LDL Cholesterol  118 (H) 0 - 100 mg/dL    VLDL Cholesterol 25 5 - 40 mg/dL    LDL/HDL Ratio 2.04    Comprehensive metabolic panel    Specimen: Blood   Result Value Ref Range    Glucose 83 65 - 99 mg/dL    BUN 16 8 - 23 mg/dL    Creatinine 0.84 0.57 - 1.00 mg/dL    Sodium 140 136 - 145 mmol/L    Potassium 3.8 3.5 - 5.2 mmol/L    Chloride 102 98 - 107 mmol/L    CO2 26.9 22.0 - 29.0 mmol/L    Calcium 10.1 8.6 - 10.5 mg/dL    Total Protein 7.0 6.0 - 8.5 g/dL    Albumin 4.30 3.50 - 5.20 g/dL    ALT (SGPT) 18 1 - 33 U/L    AST (SGOT) 19 1 - 32 U/L    Alkaline Phosphatase 82 39 - 117 U/L    Total Bilirubin 0.5 0.0 - 1.2 mg/dL    Globulin 2.7 gm/dL    A/G Ratio 1.6 g/dL    BUN/Creatinine Ratio 19.0 7.0 - 25.0    Anion Gap 11.1 5.0 - 15.0 mmol/L    eGFR 74.9 >60.0 mL/min/1.73   Vitamin D 25 hydroxy    Specimen: Blood   Result Value Ref Range    25 Hydroxy, Vitamin D 50.4 30.0 - 100.0 ng/ml   CBC Auto Differential    Specimen: Blood   Result Value  Ref Range    WBC 6.93 3.40 - 10.80 10*3/mm3    RBC 4.50 3.77 - 5.28 10*6/mm3    Hemoglobin 13.2 12.0 - 15.9 g/dL    Hematocrit 39.0 34.0 - 46.6 %    MCV 86.7 79.0 - 97.0 fL    MCH 29.3 26.6 - 33.0 pg    MCHC 33.8 31.5 - 35.7 g/dL    RDW 13.1 12.3 - 15.4 %    RDW-SD 40.3 37.0 - 54.0 fl    MPV 10.2 6.0 - 12.0 fL    Platelets 289 140 - 450 10*3/mm3    Neutrophil % 49.2 42.7 - 76.0 %    Lymphocyte % 39.2 19.6 - 45.3 %    Monocyte % 6.5 5.0 - 12.0 %    Eosinophil % 3.9 0.3 - 6.2 %    Basophil % 0.9 0.0 - 1.5 %    Immature Grans % 0.3 0.0 - 0.5 %    Neutrophils, Absolute 3.41 1.70 - 7.00 10*3/mm3    Lymphocytes, Absolute 2.72 0.70 - 3.10 10*3/mm3    Monocytes, Absolute 0.45 0.10 - 0.90 10*3/mm3    Eosinophils, Absolute 0.27 0.00 - 0.40 10*3/mm3    Basophils, Absolute 0.06 0.00 - 0.20 10*3/mm3    Immature Grans, Absolute 0.02 0.00 - 0.05 10*3/mm3    nRBC 0.0 0.0 - 0.2 /100 WBC      Notes brought forward are reviewed and updated if indicated.     Assessment & Plan   Problems Addressed this Visit        Cardiac and Vasculature    Essential hypertension - Primary (Chronic)    Relevant Medications    amLODIPine (NORVASC) 10 MG tablet    hydroCHLOROthiazide (HYDRODIURIL) 25 MG tablet    potassium chloride (MICRO-K) 8 MEQ CR capsule    losartan (COZAAR) 100 MG tablet       Mental Health    Anxiety state    Relevant Medications    escitalopram (Lexapro) 10 MG tablet    hydrOXYzine (ATARAX) 25 MG tablet      Other Visit Diagnoses     Reactive depression        Relevant Medications    escitalopram (Lexapro) 10 MG tablet    hydrOXYzine (ATARAX) 25 MG tablet    Encounter for screening mammogram for malignant neoplasm of breast          Diagnoses       Codes Comments    Essential hypertension    -  Primary ICD-10-CM: I10  ICD-9-CM: 401.9     Anxiety state     ICD-10-CM: F41.1  ICD-9-CM: 300.00     Reactive depression     ICD-10-CM: F32.9  ICD-9-CM: 300.4     Encounter for screening mammogram for malignant neoplasm of breast      ICD-10-CM: Z12.31  ICD-9-CM: V76.12           Will notify regarding results. Continue current medications. Start escitalopram for anxiety and depression.   Age-appropriate counseling is provided.   New Medications Ordered This Visit   Medications   • escitalopram (Lexapro) 10 MG tablet     Sig: Take 1 tablet by mouth Daily.     Dispense:  30 tablet     Refill:  2   • hydrOXYzine (ATARAX) 25 MG tablet     Sig: Take 1 tablet by mouth 3 (Three) Times a Day As Needed for Anxiety.     Dispense:  30 tablet     Refill:  1   • amLODIPine (NORVASC) 10 MG tablet     Sig: Take 1 tablet by mouth Daily. for blood pressure     Dispense:  90 tablet     Refill:  1   • hydroCHLOROthiazide (HYDRODIURIL) 25 MG tablet     Sig: Take 1 tablet by mouth Daily.     Dispense:  90 tablet     Refill:  1   • potassium chloride (MICRO-K) 8 MEQ CR capsule     Sig: Take 1 capsule by mouth 2 (Two) Times a Day.     Dispense:  180 capsule     Refill:  3   • losartan (COZAAR) 100 MG tablet     Sig: Take 1 tablet by mouth Daily. for blood pressure     Dispense:  90 tablet     Refill:  1     Return in about 3 months (around 11/16/2022) for Recheck.        This document has been electronically signed by Peyton Stockton MD on August 16, 2022 15:48 CDT

## 2022-11-16 ENCOUNTER — OFFICE VISIT (OUTPATIENT)
Dept: FAMILY MEDICINE CLINIC | Facility: CLINIC | Age: 72
End: 2022-11-16

## 2022-11-16 VITALS
HEART RATE: 66 BPM | OXYGEN SATURATION: 98 % | BODY MASS INDEX: 32.3 KG/M2 | DIASTOLIC BLOOD PRESSURE: 78 MMHG | RESPIRATION RATE: 18 BRPM | SYSTOLIC BLOOD PRESSURE: 130 MMHG | WEIGHT: 201 LBS | HEIGHT: 66 IN

## 2022-11-16 DIAGNOSIS — F41.1 ANXIETY STATE: ICD-10-CM

## 2022-11-16 DIAGNOSIS — F32.9 REACTIVE DEPRESSION: ICD-10-CM

## 2022-11-16 PROCEDURE — 99213 OFFICE O/P EST LOW 20 MIN: CPT | Performed by: GENERAL PRACTICE

## 2022-11-16 RX ORDER — ESCITALOPRAM OXALATE 10 MG/1
10 TABLET ORAL DAILY
Qty: 90 TABLET | Refills: 0 | Status: SHIPPED | OUTPATIENT
Start: 2022-11-16 | End: 2022-12-30 | Stop reason: SDUPTHER

## 2022-11-16 NOTE — PROGRESS NOTES
"Subjective   Cari Brooks is a 71 y.o. female.   Chief Complaint   Patient presents with   • Anxiety     History of Present Illness     For review and evaluation of management of chronic medical problems. Records reviewed. Any recent labs, xrays reviewed and medications reconciled. Depression and anxiety is imiproved on escitalopram. No side effects.     The following portions of the patient's history were reviewed and updated as appropriate: allergies, current medications, past social history and problem list.    Outpatient Medications Prior to Visit   Medication Sig Dispense Refill   • amLODIPine (NORVASC) 10 MG tablet Take 1 tablet by mouth Daily. for blood pressure 90 tablet 1   • Cholecalciferol (D3-1000) 1000 units capsule Take 1,000 Units by mouth Daily. cholecalciferol (vitamin D3) 1,000 unit capsule      • dicyclomine (BENTYL) 10 MG capsule Take 10 mg by mouth 2 (Two) Times a Day.     • hydroCHLOROthiazide (HYDRODIURIL) 25 MG tablet Take 1 tablet by mouth Daily. 90 tablet 1   • hydrOXYzine (ATARAX) 25 MG tablet Take 1 tablet by mouth 3 (Three) Times a Day As Needed for Anxiety. 30 tablet 1   • losartan (COZAAR) 100 MG tablet Take 1 tablet by mouth Daily. for blood pressure 90 tablet 1   • ondansetron (ZOFRAN) 4 MG tablet Take 4 mg by mouth Every 8 (Eight) Hours As Needed for Nausea or Vomiting.     • potassium chloride (MICRO-K) 8 MEQ CR capsule Take 1 capsule by mouth 2 (Two) Times a Day. 180 capsule 3   • escitalopram (Lexapro) 10 MG tablet Take 1 tablet by mouth Daily. 30 tablet 2     No facility-administered medications prior to visit.       Review of Systems  I have reviewed 12 systems with patient. Findings were negative except what is noted below and/or in history of present illness.     Objective   Visit Vitals  /78   Pulse 66   Resp 18   Ht 167.6 cm (66\")   Wt 91.2 kg (201 lb)   LMP  (LMP Unknown)   SpO2 98%   BMI 32.44 kg/m²     Physical Exam  Vitals and nursing note reviewed. "   Constitutional:       General: She is not in acute distress.     Appearance: She is well-developed.   HENT:      Head: Normocephalic and atraumatic.      Nose: Nose normal.   Eyes:      General:         Right eye: No discharge.         Left eye: No discharge.      Conjunctiva/sclera: Conjunctivae normal.      Pupils: Pupils are equal, round, and reactive to light.   Neck:      Thyroid: No thyromegaly.   Cardiovascular:      Rate and Rhythm: Normal rate and regular rhythm.      Heart sounds: Normal heart sounds.   Pulmonary:      Effort: Pulmonary effort is normal.      Breath sounds: Normal breath sounds.   Lymphadenopathy:      Cervical: No cervical adenopathy.   Skin:     General: Skin is warm and dry.   Neurological:      Mental Status: She is alert and oriented to person, place, and time.         Notes brought forward are reviewed and updated if indicated.     Assessment & Plan   Problems Addressed this Visit        Mental Health    Anxiety state    Relevant Medications    escitalopram (Lexapro) 10 MG tablet   Other Visit Diagnoses     Reactive depression        Relevant Medications    escitalopram (Lexapro) 10 MG tablet      Diagnoses       Codes Comments    Anxiety state     ICD-10-CM: F41.1  ICD-9-CM: 300.00     Reactive depression     ICD-10-CM: F32.9  ICD-9-CM: 300.4            Continue current medications.     New Medications Ordered This Visit   Medications   • escitalopram (Lexapro) 10 MG tablet     Sig: Take 1 tablet by mouth Daily.     Dispense:  90 tablet     Refill:  0     Return in about 3 months (around 2/16/2023) for Recheck.        This document has been electronically signed by Peyton Stockton MD on November 16, 2022 16:43 CST

## 2022-12-30 ENCOUNTER — TELEPHONE (OUTPATIENT)
Dept: FAMILY MEDICINE CLINIC | Facility: CLINIC | Age: 72
End: 2022-12-30

## 2022-12-30 DIAGNOSIS — F32.9 REACTIVE DEPRESSION: ICD-10-CM

## 2022-12-30 DIAGNOSIS — F41.1 ANXIETY STATE: ICD-10-CM

## 2022-12-30 RX ORDER — ESCITALOPRAM OXALATE 10 MG/1
10 TABLET ORAL DAILY
Qty: 90 TABLET | Refills: 0 | Status: SHIPPED | OUTPATIENT
Start: 2022-12-30 | End: 2023-01-04 | Stop reason: SDUPTHER

## 2022-12-30 RX ORDER — ESCITALOPRAM OXALATE 10 MG/1
10 TABLET ORAL DAILY
Qty: 90 TABLET | Refills: 0 | Status: SHIPPED | OUTPATIENT
Start: 2022-12-30 | End: 2022-12-30 | Stop reason: SDUPTHER

## 2022-12-30 NOTE — TELEPHONE ENCOUNTER
[facility] called stating they are needing a new prescription for patients Escitalopram 10 mg tablets.     Phone # [phone]  [facility] # [phone]

## 2022-12-30 NOTE — TELEPHONE ENCOUNTER
Next Appt  With Family Medicine (Peyton Stockton MD)  02/16/2023 at 10:45 AM    Rx [facility]

## 2023-01-04 DIAGNOSIS — F32.9 REACTIVE DEPRESSION: ICD-10-CM

## 2023-01-04 DIAGNOSIS — F41.1 ANXIETY STATE: ICD-10-CM

## 2023-01-04 RX ORDER — ESCITALOPRAM OXALATE 10 MG/1
10 TABLET ORAL DAILY
Qty: 90 TABLET | Refills: 0 | Status: SHIPPED | OUTPATIENT
Start: 2023-01-04 | End: 2023-03-20

## 2023-02-28 ENCOUNTER — TELEPHONE (OUTPATIENT)
Dept: FAMILY MEDICINE CLINIC | Facility: CLINIC | Age: 73
End: 2023-02-28
Payer: MEDICARE

## 2023-02-28 RX ORDER — CEFPROZIL 500 MG/1
500 TABLET, FILM COATED ORAL 2 TIMES DAILY
Qty: 14 TABLET | Refills: 0 | Status: SHIPPED | OUTPATIENT
Start: 2023-02-28

## 2023-02-28 NOTE — TELEPHONE ENCOUNTER
Pt. Called stated that she has had covid in the last two weeks. She states she is feeling better but the Pt.s sinus and ears are not clearing up. The Pt. Would like to know if an antibiotic can be called in.       You can call back at [phone]    Pharmacy: [facility].

## 2023-03-20 DIAGNOSIS — F41.1 ANXIETY STATE: ICD-10-CM

## 2023-03-20 DIAGNOSIS — F32.9 REACTIVE DEPRESSION: ICD-10-CM

## 2023-03-20 RX ORDER — ESCITALOPRAM OXALATE 10 MG/1
TABLET ORAL
Qty: 90 TABLET | Refills: 0 | Status: SHIPPED | OUTPATIENT
Start: 2023-03-20

## 2023-04-18 ENCOUNTER — OFFICE VISIT (OUTPATIENT)
Dept: FAMILY MEDICINE CLINIC | Facility: CLINIC | Age: 73
End: 2023-04-18
Payer: MEDICARE

## 2023-04-18 VITALS
SYSTOLIC BLOOD PRESSURE: 126 MMHG | BODY MASS INDEX: 33.27 KG/M2 | WEIGHT: 207 LBS | HEIGHT: 66 IN | HEART RATE: 66 BPM | OXYGEN SATURATION: 96 % | DIASTOLIC BLOOD PRESSURE: 78 MMHG

## 2023-04-18 DIAGNOSIS — Z00.00 MEDICARE ANNUAL WELLNESS VISIT, SUBSEQUENT: Primary | ICD-10-CM

## 2023-04-18 DIAGNOSIS — I10 ESSENTIAL HYPERTENSION: Chronic | ICD-10-CM

## 2023-04-18 DIAGNOSIS — J45.909 MILD REACTIVE AIRWAYS DISEASE, UNSPECIFIED WHETHER PERSISTENT: ICD-10-CM

## 2023-04-18 RX ORDER — HYDROCHLOROTHIAZIDE 25 MG/1
25 TABLET ORAL DAILY
Qty: 90 TABLET | Refills: 1 | Status: SHIPPED | OUTPATIENT
Start: 2023-04-18

## 2023-04-18 RX ORDER — METHYLPREDNISOLONE 4 MG/1
TABLET ORAL
Qty: 21 TABLET | Refills: 0 | Status: SHIPPED | OUTPATIENT
Start: 2023-04-18

## 2023-04-18 RX ORDER — LOSARTAN POTASSIUM 100 MG/1
100 TABLET ORAL DAILY
Qty: 90 TABLET | Refills: 1 | Status: SHIPPED | OUTPATIENT
Start: 2023-04-18

## 2023-04-18 NOTE — PROGRESS NOTES
The ABCs of the Annual Wellness Visit  Subsequent Medicare Wellness Visit    Chief Complaint   Patient presents with   • Medicare Wellness-subsequent      Subjective    History of Present Illness:  Cari Brooks is a 72 y.o. female who presents for a Subsequent Medicare Wellness Visit.    The following portions of the patient's history were reviewed and   updated as appropriate: allergies, current medications, past family history, past medical history, past social history, past surgical history and problem list.    Compared to one year ago, the patient feels her physical   health is the same.    Compared to one year ago, the patient feels her mental   health is the same.    Recent Hospitalizations:  She was not admitted to the hospital during the last year.       Current Medical Providers:  Patient Care Team:  Peyton Stockton MD as PCP - General  ArnettBrad MD as Surgeon (General Surgery)  Jorje Delgado OD as Consulting Physician (Optometry)  Nathen Szymanski DMD as Consulting Physician (Dental General Practice)    Outpatient Medications Prior to Visit   Medication Sig Dispense Refill   • amLODIPine (NORVASC) 10 MG tablet Take 1 tablet by mouth Daily. for blood pressure 90 tablet 1   • Cholecalciferol (D3-1000) 1000 units capsule Take 1 capsule by mouth Daily. cholecalciferol (vitamin D3) 1,000 unit capsule     • dicyclomine (BENTYL) 10 MG capsule Take 1 capsule by mouth 2 (Two) Times a Day.     • escitalopram (LEXAPRO) 10 MG tablet TAKE 1 TABLET EVERY DAY 90 tablet 0   • hydrOXYzine (ATARAX) 25 MG tablet Take 1 tablet by mouth 3 (Three) Times a Day As Needed for Anxiety. 30 tablet 1   • ondansetron (ZOFRAN) 4 MG tablet Take 1 tablet by mouth Every 8 (Eight) Hours As Needed for Nausea or Vomiting.     • potassium chloride (MICRO-K) 8 MEQ CR capsule Take 1 capsule by mouth 2 (Two) Times a Day. 180 capsule 3   • hydroCHLOROthiazide (HYDRODIURIL) 25 MG tablet Take 1 tablet by mouth Daily. 90  "tablet 1   • losartan (COZAAR) 100 MG tablet Take 1 tablet by mouth Daily. for blood pressure 90 tablet 1   • cefprozil (CEFZIL) 500 MG tablet Take 1 tablet by mouth 2 (Two) Times a Day. (Patient not taking: Reported on 4/18/2023) 14 tablet 0     No facility-administered medications prior to visit.       No opioid medication identified on active medication list. I have reviewed chart for other potential  high risk medication/s and harmful drug interactions in the elderly.          Aspirin is not on active medication list.  Aspirin use is not indicated based on review of current medical condition/s. Risk of harm outweighs potential benefits.  .    Patient Active Problem List   Diagnosis   • Anxiety state   • Asthma   • Essential hypertension   • Vitamin D deficiency   • Osteoarthritis   • GERD without esophagitis   • Internal derangement of knee joint, left   • Left knee pain   • Class 1 obesity due to excess calories with serious comorbidity and body mass index (BMI) of 31.0 to 31.9 in adult     Advance Care Planning  Advance Directive is not on file.  ACP discussion was held with the patient during this visit. Patient does not have an advance directive, information provided.          Objective    Vitals:    04/18/23 0859   BP: 126/78   Pulse: 66   SpO2: 96%   Weight: 93.9 kg (207 lb)   Height: 167.6 cm (66\")   PainSc:   2   PainLoc: Generalized     Estimated body mass index is 33.41 kg/m² as calculated from the following:    Height as of this encounter: 167.6 cm (66\").    Weight as of this encounter: 93.9 kg (207 lb).    BMI is >= 30 and <35. (Class 1 Obesity). The following options were offered after discussion;: exercise counseling/recommendations and nutrition counseling/recommendations      Does the patient have evidence of cognitive impairment? No    Physical Exam            HEALTH RISK ASSESSMENT    Smoking Status:  Social History     Tobacco Use   Smoking Status Never   Smokeless Tobacco Never     Alcohol " Consumption:  Social History     Substance and Sexual Activity   Alcohol Use No     Fall Risk Screen:    AGAPITOADI Fall Risk Assessment was completed, and patient is at MODERATE risk for falls. Assessment completed on:2023    Depression Screenin/18/2023     9:00 AM   PHQ-2/PHQ-9 Depression Screening   Little Interest or Pleasure in Doing Things 0-->not at all   Feeling Down, Depressed or Hopeless 0-->not at all   Trouble Falling or Staying Asleep, or Sleeping Too Much 0-->not at all   Feeling Tired or Having Little Energy 0-->not at all   Poor Appetite or Overeating 0-->not at all   Feeling Bad about Yourself - or that You are a Failure or Have Let Yourself or Your Family Down 0-->not at all   Trouble Concentrating on Things, Such as Reading the Newspaper or Watching Television 0-->not at all   Moving or Speaking So Slowly that Other People Could Have Noticed? Or the Opposite - Being So Fidgety 0-->not at all   Thoughts that You Would be Better Off Dead or of Hurting Yourself in Some Way 0-->not at all   PHQ-9: Brief Depression Severity Measure Score 0       Health Habits and Functional and Cognitive Screenin/18/2023     9:01 AM   Functional & Cognitive Status   Do you have difficulty preparing food and eating? No   Do you have difficulty bathing yourself, getting dressed or grooming yourself? No   Do you have difficulty using the toilet? No   Do you have difficulty moving around from place to place? No   Do you have trouble with steps or getting out of a bed or a chair? No   Current Diet Well Balanced Diet   Dental Exam Up to date   Eye Exam Up to date   Exercise (times per week) 0 times per week   Current Exercises Include No Regular Exercise   Do you need help using the phone?  No   Are you deaf or do you have serious difficulty hearing?  No   Do you need help with transportation? No   Do you need help shopping? No   Do you need help preparing meals?  No   Do you need help with housework?  No    Do you need help with laundry? No   Do you need help taking your medications? No   Do you need help managing money? No   Do you ever drive or ride in a car without wearing a seat belt? No   Have you felt unusual stress, anger or loneliness in the last month? No   Who do you live with? Spouse   If you need help, do you have trouble finding someone available to you? No   Have you been bothered in the last four weeks by sexual problems? No   Do you have difficulty concentrating, remembering or making decisions? No       Age-appropriate Screening Schedule:  Refer to the list below for future screening recommendations based on patient's age, sex and/or medical conditions. Orders for these recommended tests are listed in the plan section. The patient has been provided with a written plan.    Health Maintenance   Topic Date Due   • ZOSTER VACCINE (2 of 2) 08/08/2017   • ANNUAL WELLNESS VISIT  01/26/2023   • INFLUENZA VACCINE  08/01/2023   • MAMMOGRAM  08/16/2024   • DXA SCAN  08/16/2024   • TDAP/TD VACCINES (3 - Td or Tdap) 06/13/2027   • COLORECTAL CANCER SCREENING  10/30/2029   • HEPATITIS C SCREENING  Completed   • COVID-19 Vaccine  Completed   • Pneumococcal Vaccine 65+  Completed   • PAP SMEAR  Discontinued              Assessment & Plan   CMS Preventative Services Quick Reference  Risk Factors Identified During Encounter  Fall Risk-High or Moderate: Discussed Fall Prevention in the home  Immunizations Discussed/Encouraged: Shingrix  The above risks/problems have been discussed with the patient.  Follow up actions/plans if indicated are seen below in the Assessment/Plan Section.  Pertinent information has been shared with the patient in the After Visit Summary.    Diagnoses and all orders for this visit:    1. Medicare annual wellness visit, subsequent (Primary)    2. Mild reactive airways disease, unspecified whether persistent  -     methylPREDNISolone (MEDROL) 4 MG dose pack; Take as directed on package  instructions.  Dispense: 21 tablet; Refill: 0    3. Essential hypertension  -     losartan (COZAAR) 100 MG tablet; Take 1 tablet by mouth Daily. for blood pressure  Dispense: 90 tablet; Refill: 1  -     hydroCHLOROthiazide (HYDRODIURIL) 25 MG tablet; Take 1 tablet by mouth Daily.  Dispense: 90 tablet; Refill: 1  -     CBC & Differential; Future  -     Comprehensive Metabolic Panel; Future  -     Lipid Panel; Future  -     Urinalysis With Culture If Indicated -; Future        Follow Up:   Return in about 6 months (around 10/18/2023) for Annual physical.     An After Visit Summary and PPPS were made available to the patient.  Information has been scanned into chart. Discussed importance of taking medications as prescribed. Encouraged healthy eating habits with low fat, low salt choices and working towards maintaining a healthy weight. Recommended regular exercise if able as well as care to prevent falls including avoiding anything on the floor that they could slip or trip on such as throw rugs, making sure they have a bathmat to step onto when their feet are wet and having grab bars and railings where needed.

## 2023-04-18 NOTE — PROGRESS NOTES
Subjective   Cari Brooks is a 72 y.o. female.   Chief Complaint   Patient presents with   • Medicare Wellness-subsequent     Had a recent URI, now has persistent wheezy cough, thinks it is being aggravated by allergies.   Cough  This is a new problem. The current episode started 1 to 4 weeks ago. The problem has been unchanged. The problem occurs hourly. The cough is non-productive. Associated symptoms include wheezing. Pertinent negatives include no chest pain or shortness of breath. The symptoms are aggravated by exercise. She has tried OTC cough suppressant for the symptoms. The treatment provided mild relief.      The following portions of the patient's history were reviewed and updated as appropriate: allergies, current medications, past social history and problem list.    Outpatient Medications Prior to Visit   Medication Sig Dispense Refill   • amLODIPine (NORVASC) 10 MG tablet Take 1 tablet by mouth Daily. for blood pressure 90 tablet 1   • Cholecalciferol (D3-1000) 1000 units capsule Take 1 capsule by mouth Daily. cholecalciferol (vitamin D3) 1,000 unit capsule     • dicyclomine (BENTYL) 10 MG capsule Take 1 capsule by mouth 2 (Two) Times a Day.     • escitalopram (LEXAPRO) 10 MG tablet TAKE 1 TABLET EVERY DAY 90 tablet 0   • hydrOXYzine (ATARAX) 25 MG tablet Take 1 tablet by mouth 3 (Three) Times a Day As Needed for Anxiety. 30 tablet 1   • ondansetron (ZOFRAN) 4 MG tablet Take 1 tablet by mouth Every 8 (Eight) Hours As Needed for Nausea or Vomiting.     • potassium chloride (MICRO-K) 8 MEQ CR capsule Take 1 capsule by mouth 2 (Two) Times a Day. 180 capsule 3   • hydroCHLOROthiazide (HYDRODIURIL) 25 MG tablet Take 1 tablet by mouth Daily. 90 tablet 1   • losartan (COZAAR) 100 MG tablet Take 1 tablet by mouth Daily. for blood pressure 90 tablet 1   • cefprozil (CEFZIL) 500 MG tablet Take 1 tablet by mouth 2 (Two) Times a Day. (Patient not taking: Reported on 4/18/2023) 14 tablet 0     No  "facility-administered medications prior to visit.       Review of Systems   Respiratory: Positive for cough and wheezing. Negative for shortness of breath.    Cardiovascular: Negative for chest pain.     I have reviewed 12 systems with patient. Findings were negative except what is noted below and/or in history of present illness.     Objective   Visit Vitals  /78   Pulse 66   Ht 167.6 cm (66\")   Wt 93.9 kg (207 lb)   LMP  (LMP Unknown)   SpO2 96%   BMI 33.41 kg/m²     Physical Exam  Vitals and nursing note reviewed.   Constitutional:       General: She is not in acute distress.     Appearance: She is well-developed.   HENT:      Head: Normocephalic and atraumatic.      Nose: Nose normal.   Eyes:      General:         Right eye: No discharge.         Left eye: No discharge.      Conjunctiva/sclera: Conjunctivae normal.      Pupils: Pupils are equal, round, and reactive to light.   Neck:      Thyroid: No thyromegaly.   Cardiovascular:      Rate and Rhythm: Normal rate and regular rhythm.      Heart sounds: Normal heart sounds.   Pulmonary:      Effort: Pulmonary effort is normal.      Breath sounds: Wheezing (few scattered) present.   Lymphadenopathy:      Cervical: No cervical adenopathy.   Skin:     General: Skin is warm and dry.   Neurological:      Mental Status: She is alert and oriented to person, place, and time.         Notes brought forward are reviewed and updated if indicated.     Assessment & Plan   Problems Addressed this Visit        Cardiac and Vasculature    Essential hypertension (Chronic)    Relevant Medications    losartan (COZAAR) 100 MG tablet    hydroCHLOROthiazide (HYDRODIURIL) 25 MG tablet    Other Relevant Orders    CBC & Differential    Comprehensive Metabolic Panel    Lipid Panel    Urinalysis With Culture If Indicated -   Other Visit Diagnoses     Medicare annual wellness visit, subsequent    -  Primary    Mild reactive airways disease, unspecified whether persistent        Relevant " Medications    methylPREDNISolone (MEDROL) 4 MG dose pack      Diagnoses       Codes Comments    Medicare annual wellness visit, subsequent    -  Primary ICD-10-CM: Z00.00  ICD-9-CM: V70.0     Mild reactive airways disease, unspecified whether persistent     ICD-10-CM: J45.909  ICD-9-CM: 493.90     Essential hypertension     ICD-10-CM: I10  ICD-9-CM: 401.9           Continue current medications. Recheck if not improving.      New Medications Ordered This Visit   Medications   • methylPREDNISolone (MEDROL) 4 MG dose pack     Sig: Take as directed on package instructions.     Dispense:  21 tablet     Refill:  0   • losartan (COZAAR) 100 MG tablet     Sig: Take 1 tablet by mouth Daily. for blood pressure     Dispense:  90 tablet     Refill:  1   • hydroCHLOROthiazide (HYDRODIURIL) 25 MG tablet     Sig: Take 1 tablet by mouth Daily.     Dispense:  90 tablet     Refill:  1     Return in about 6 months (around 10/18/2023) for Annual physical.        This document has been electronically signed by Peyton Stockton MD on April 18, 2023 10:28 CDT

## 2023-04-18 NOTE — PATIENT INSTRUCTIONS
Check at pharmacy on Shingrix shingles vaccine    Medicare Wellness  Personal Prevention Plan of Service     Date of Office Visit:    Encounter Provider:  Peyton Stockton MD  Place of Service:  [facility]  Patient Name: Cari Brooks  :  1950    As part of the Medicare Wellness portion of your visit today, we are providing you with this personalized preventive plan of services (PPPS). This plan is based upon recommendations of the [place] Preventive Services Task Force (USPSTF) and the Advisory Committee on Immunization Practices (ACIP).    This lists the preventive care services that should be considered, and provides dates of when you are due. Items listed as completed are up-to-date and do not require any further intervention.    Health Maintenance   Topic Date Due    ZOSTER VACCINE (2 of 2) 2017    ANNUAL WELLNESS VISIT  2023    INFLUENZA VACCINE  2023    MAMMOGRAM  2024    DXA SCAN  2024    TDAP/TD VACCINES (3 - Td or Tdap) 2027    COLORECTAL CANCER SCREENING  10/30/2029    HEPATITIS C SCREENING  Completed    COVID-19 Vaccine  Completed    Pneumococcal Vaccine 65+  Completed    PAP SMEAR  Discontinued       No orders of the defined types were placed in this encounter.      No follow-ups on file.

## 2023-04-24 DIAGNOSIS — Z12.31 ENCOUNTER FOR SCREENING MAMMOGRAM FOR MALIGNANT NEOPLASM OF BREAST: Primary | ICD-10-CM

## 2023-06-15 DIAGNOSIS — I10 ESSENTIAL HYPERTENSION: Chronic | ICD-10-CM

## 2023-06-15 RX ORDER — LOSARTAN POTASSIUM 100 MG/1
100 TABLET ORAL DAILY
Qty: 90 TABLET | Refills: 1 | Status: SHIPPED | OUTPATIENT
Start: 2023-06-15

## 2023-06-15 NOTE — TELEPHONE ENCOUNTER
UPCOMING APPTS  With Family Medicine (Peyton Stockton MD)  10/18/2023 at 11:00 AM  LAST OFFICE VISIT - THIS DEPT  4/18/2023 Peyton Stockton MD

## 2023-08-13 DIAGNOSIS — I10 ESSENTIAL HYPERTENSION: Chronic | ICD-10-CM

## 2023-08-14 RX ORDER — HYDROCHLOROTHIAZIDE 25 MG/1
TABLET ORAL
Qty: 90 TABLET | Refills: 1 | Status: SHIPPED | OUTPATIENT
Start: 2023-08-14

## 2023-09-13 DIAGNOSIS — I10 ESSENTIAL HYPERTENSION: Chronic | ICD-10-CM

## 2023-09-14 DIAGNOSIS — F32.9 REACTIVE DEPRESSION: ICD-10-CM

## 2023-09-14 DIAGNOSIS — F41.1 ANXIETY STATE: ICD-10-CM

## 2023-09-14 RX ORDER — ESCITALOPRAM OXALATE 10 MG/1
TABLET ORAL
Qty: 90 TABLET | Refills: 1 | Status: SHIPPED | OUTPATIENT
Start: 2023-09-14

## 2023-09-14 RX ORDER — POTASSIUM CHLORIDE 600 MG/1
CAPSULE, EXTENDED RELEASE ORAL
Qty: 180 CAPSULE | Refills: 3 | Status: SHIPPED | OUTPATIENT
Start: 2023-09-14

## 2023-11-21 NOTE — TELEPHONE ENCOUNTER
Per  DR. Stockton, Ms. Cari Brooks has been called with recent CT of the Abdomen & Pelvis results & recommendations.  Continue current medications and follow-up as planned or sooner if any problems.      ----- Message from Peyton Stockton MD sent at 7/29/2019  5:00 PM CDT -----  Call and tell CT is normal, only shows diverticulosis which is nothing new     Routine Office Visit    Patient Name: Monique Gutierrez    : 1979  MRN: [id number]    Chief Complaint:  Rash    Subjective:  Monique 

## 2025-02-12 NOTE — THERAPY PROGRESS REPORT/RE-CERT
Outpatient Physical Therapy Ortho Progress Note  [facility]     Patient Name: Cari Brooks  : 1950  MRN: [id number]  Today's Date: 2018      Visit Date: 2018      Attendance    Authorized Medicare   Pre Rx pain 0   Post Rx pain 0   % improvement 95%   MD follow up    Recert date            Visit Dx:    ICD-10-CM ICD-9-CM   1. Internal derangement of knee joint, left M23.92 717.9   2. Acute pain of left knee M25.562 719.46       Patient Active Problem List   Diagnosis   • Anxiety state   • Asthma   • Essential hypertension   • Vitamin D deficiency   • Osteoarthritis   • GERD without esophagitis   • Internal derangement of knee joint, left   • Left knee pain        Past Medical History:   Diagnosis Date   • Acute maxillary sinusitis    • Acute sinusitis    • Allergic rhinitis    • Anxiety state    • Asthma    • Bronchopneumonia    • Contact dermatitis due to plant    • Cough    • Edema of lower extremity    • Encounter for general adult medical examination without abnormal findings    • Encounter for immunization    • Encounter for routine adult health examination    • Equinus contracture of ankle    • Essential hypertension    • Fever    • GERD (gastroesophageal reflux disease)    • Heel pain    • Knee pain    • Osteoarthritis    • Otalgia    • Pain in limb    • Plantar fasciitis    • PONV (postoperative nausea and vomiting)    • Routine general medical examination at health care facility    • Screening for malignant neoplasm of colon    • Screening mammogram, encounter for    • Synovial cyst of knee    • Vitamin D deficiency         Past Surgical History:   Procedure Laterality Date   • BACK SURGERY  2018   • ENDOSCOPY  2012    Colon endoscopy 56047 (2)    Mild diverticulosis in sigmoid colon. 1 polyp in colon 35 cm from entry & 1 polyp in colon 15 cm from entry; both removed by cold biopsy polypectomy. Hemorrhoids found.    • HYSTERECTOMY       Hysterectomy w/ oophorectomy (1)     • INJECTION OF MEDICATION  09/03/2015    Kenalog (3)      • KNEE ARTHROSCOPY     • KNEE ARTHROSCOPY Left 10/15/2018    Procedure: KNEE ARTHROSCOPY with debridement medial meniscus  ;  Surgeon: Nathen Zapien MD;  Location: [facility];  Service: Orthopedics   • LAPAROSCOPIC CHOLECYSTECTOMY  10/04/2001   • OOPHORECTOMY     • PAP SMEAR  02/12/2009   • REPAIR ANKLE LIGAMENT  10/12/2004   • TONSILLECTOMY  06/20/1956             PT Ortho     Row Name 11/07/18 0900       Subjective Comments    Subjective Comments I am doing well.  -DD       Subjective Pain    Able to rate subjective pain? yes  -DD    Pre-Treatment Pain Level 0  -DD       Posture/Observations    Posture/Observations Comments Non antalgic gait  -DD    Row Name 11/05/18 0900       Subjective Comments    Subjective Comments Patient state that she is doing well.  No c/o  -CP       Subjective Pain    Able to rate subjective pain? yes  -CP    Pre-Treatment Pain Level 0  -CP       Posture/Observations    Posture/Observations Comments Non antalgic gait  -CP      User Key  (r) = Recorded By, (t) = Taken By, (c) = Cosigned By    Initials Name Provider Type    DD Jennifer Briscoe, PT DPT Physical Therapist    CP Dilma Briscoe, PTA Physical Therapy Assistant                            PT Assessment/Plan     Row Name 11/07/18 1053          PT Assessment    Functional Limitations Performance in leisure activities  -DD     Impairments Muscle strength  -DD     Assessment Comments Pt has made good progress and is able to exercise painfree,  HIp abd and flexion still weak bilaterally.   -DD     Please refer to paper survey for additional self-reported information Yes  -DD     Rehab Potential Excellent  -DD     Patient/caregiver participated in establishment of treatment plan and goals Yes  -DD     Patient would benefit from skilled therapy intervention Yes  -DD        PT Plan    PT Frequency 2x/week  -DD     Predicted  "Duration of Therapy Intervention (Therapy Eval) 1 week  -DD     PT Plan Comments [facility] exercises and fitness activities for free month.  2 visits  -DD       User Key  (r) = Recorded By, (t) = Taken By, (c) = Cosigned By    Initials Name Provider Type    Jennifer Garsia, PT DPT Physical Therapist                Modalities     Row Name 11/07/18 0900             Ice    Patient reports will apply ice at home to involved area Yes   took bag with her  -DD        User Key  (r) = Recorded By, (t) = Taken By, (c) = Cosigned By    Initials Name Provider Type    Jennifer Garsia, PT DPT Physical Therapist                Exercises     Row Name 11/07/18 0930 11/07/18 0900          Subjective Comments    Subjective Comments  -- I am doing well.  -DD        Subjective Pain    Able to rate subjective pain?  -- yes  -DD     Pre-Treatment Pain Level  -- 0  -DD        Exercise 1    Exercise Name 1 Pro II level 4  -DD  --     Time 1 10  -DD  --        Exercise 2    Exercise Name 2 Incline stretch  -DD  --     Sets 2 3  -DD  --     Time 2 30  -DD  --        Exercise 3    Exercise Name 3 Standing HS Stretch  -DD  --     Sets 3 3  -DD  --     Time 3 30  -DD  --        Exercise 4    Exercise Name 4 Step up 6\"  -DD  --     Sets 4 2  -DD  --     Reps 4 10  -DD  --        Exercise 5    Exercise Name 5 Step up and over 4\"  -DD  --     Sets 5 2  -DD  --     Reps 5 10  -DD  --        Exercise 6    Exercise Name 6 MHip ext  -DD  --     Sets 6 --  -DD  --     Reps 6 15  -DD  --     Additional Comments 2pl  -DD  --        Exercise 7    Exercise Name 7 mini squats on airex  -DD  --     Sets 7 2  -DD  --     Reps 7 10  -DD  --        Exercise 8    Exercise Name 8 Cybex hip AB  -DD  --     Sets 8 2  -DD  --     Reps 8 10  -DD  --     Time 8 --  -DD  --     Additional Comments 30#  -DD  --        Exercise 9    Exercise Name 9 MHip flexion  -DD  --     Reps 9 15  -DD  --     Additional Comments 2pl  -DD  --        Exercise 10    " Exercise Name 10 Cybex LP  -DD  --     Reps 10 30  -DD  --     Additional Comments 60#  -DD  --       User Key  (r) = Recorded By, (t) = Taken By, (c) = Cosigned By    Initials Name Provider Type    Jennifer Garsia, PT DPT Physical Therapist                               PT OP Goals     Row Name 11/07/18 1055 11/07/18 0930       PT Short Term Goals    STG Date to Achieve  -- 11/07/18  -DD    STG 1  -- Pt will be independent in the HEP  -DD    STG 1 Progress  -- Met  -DD    STG 2  -- Pt will be able to perform 20 SLR without Ext lag  -DD    STG 2 Progress  -- Met  -DD    STG 3  -- Pt will ambulate with normal gait pattern  -DD    STG 3 Progress  -- Met   slow gait patern  -DD    STG 4  -- Pt will have left knee ROM  0-130.  -DD    STG 4 Progress  -- Met  -DD    STG 5  -- Pt will have LEFS 60/80  -DD    STG 5 Progress  -- Met  -DD    STG 5 Progress Comments  -- 65  -DD       Long Term Goals    LTG Date to Achieve  -- 11/07/18  -DD    LTG 1  -- PT will have 5/5 MMT quads and hamstrings Left.  -DD    LTG 1 Progress  -- Met  -DD       Time Calculation    PT Goal Re-Cert Due Date 11/28/18  -DD  --      User Key  (r) = Recorded By, (t) = Taken By, (c) = Cosigned By    Initials Name Provider Type    Jennifer Garsia, PT DPT Physical Therapist               Outcome Measure Options: Lower Extremity Functional Scale (LEFS)  Lower Extremity Functional Index  Any of your usual work, housework or school activities: A little bit of difficulty  Your usual hobbies, recreational or sporting activities: A little bit of difficulty  Getting into or out of the bath: No difficulty  Walking between rooms: No difficulty  Putting on your shoes or socks: No difficulty  Squatting: A little bit of difficulty  Lifting an object, like a bag of groceries from the floor: No difficulty  Performing light activities around your home: No difficulty  Performing heavy activities around your home: A little bit of difficulty  Getting  into or out of a car: No difficulty  Walking 2 blocks: A little bit of difficulty  Walking a mile: A little bit of difficulty  Going up or down 10 stairs (about 1 flight of stairs): A little bit of difficulty  Standing for 1 hour: A little bit of difficulty  Sitting for 1 hour: No difficulty  Running on even ground: A little bit of difficulty  Running on uneven ground: Moderate difficulty  Making sharp turns while running fast: Moderate difficulty  Hopping: Moderate difficulty  Rolling over in bed: No difficulty  Total: 65      Time Calculation:   Start Time: 0931  Stop Time: 1013  Time Calculation (min): 42 min  Therapy Suggested Charges     Code   Minutes Charges    None           Therapy Charges for Today     Code Description Service Date Service Provider Modifiers Qty    46513211790 HC PT MOBILITY CURRENT 11/7/2018 Jennifer Briscoe, PT DPT GP, CI 1    82787087425 HC PT MOBILITY PROJECTED 11/7/2018 Jennifer Briscoe, PT DPT GP, CI 1    [id number] HC PT THER PROC EA 15 MIN 11/7/2018 Jennifer Briscoe, PT DPT GP 3          PT G-Codes  PT Professional Judgement Used?: Yes  Outcome Measure Options: Lower Extremity Functional Scale (LEFS)  Total: 65  Functional Limitation: Mobility: Walking and moving around  Mobility: Walking and Moving Around Current Status (): At least 1 percent but less than 20 percent impaired, limited or restricted  Mobility: Walking and Moving Around Goal Status (): At least 1 percent but less than 20 percent impaired, limited or restricted         Jennifer Briscoe PT DPT  11/7/2018      Upper respiratory infection

## (undated) DEVICE — BITEBLOCK ENDO W/STRAP 60F A/ LF DISP

## (undated) DEVICE — SOL IRR LACT RNG BG 3000ML

## (undated) DEVICE — BLD SHAVER RESEC SABRE COOLCUT 5MM 13CM

## (undated) DEVICE — STERILE POLYISOPRENE POWDER-FREE SURGICAL GLOVES WITH EMOLLIENT COATING: Brand: PROTEXIS

## (undated) DEVICE — GLV SURG SENSICARE PI PF LF 7 GRN STRL

## (undated) DEVICE — GLV SURG TRIUMPH LT PF LTX 8 STRL

## (undated) DEVICE — SPNG GZ WOVN 4X4IN 12PLY 10/BX STRL

## (undated) DEVICE — SUT ETHLN 3-0 FS118IN 663H

## (undated) DEVICE — DRSNG GZ CURAD XEROFORM NONADHS 5X9IN STRL

## (undated) DEVICE — GOWN,AURORA,NOREINF,RAGLAN,XL,STERILE: Brand: MEDLINE

## (undated) DEVICE — GOWN,PREVENTION PLUS,XLONG/XLARGE,STRL: Brand: MEDLINE

## (undated) DEVICE — BLD DISSCT COOL CUT SJ CRVD 4MM 13CM

## (undated) DEVICE — GLV SURG SENSICARE POLYISPRN W/ALOE PF LF 6.5 GRN STRL

## (undated) DEVICE — GLV SURG SENSICARE PI LF PF 7.5 GRN STRL

## (undated) DEVICE — GLV SURG TRIUMPH PF LTX 7 STRL

## (undated) DEVICE — GLV SURG SENSICARE ALOE LF PF SZ7.5 GRN

## (undated) DEVICE — PK KN ARTHSCP LF 60

## (undated) DEVICE — TRAP,MUCUS SPECIMEN,40CC: Brand: MEDLINE

## (undated) DEVICE — TBG PUMP ARTHSCP MAIN AR6400 16FT

## (undated) DEVICE — APPL CHLORAPREP W/TINT 26ML ORNG

## (undated) DEVICE — CANN SMPL SOFTECH BIFLO ETCO2 A/M 7FT

## (undated) DEVICE — BNDG ELAS ELITE V/CLOSE 6IN 5YD LF STRL

## (undated) DEVICE — POSTN SURG ARTHSCP KN HLDR

## (undated) DEVICE — SINGLE-USE BIOPSY FORCEPS: Brand: RADIAL JAW 4